# Patient Record
Sex: FEMALE | Race: BLACK OR AFRICAN AMERICAN | NOT HISPANIC OR LATINO | Employment: OTHER | URBAN - METROPOLITAN AREA
[De-identification: names, ages, dates, MRNs, and addresses within clinical notes are randomized per-mention and may not be internally consistent; named-entity substitution may affect disease eponyms.]

---

## 2017-01-05 ENCOUNTER — HISTORICAL (OUTPATIENT)
Dept: ADMINISTRATIVE | Facility: HOSPITAL | Age: 62
End: 2017-01-05

## 2017-01-11 ENCOUNTER — HISTORICAL (OUTPATIENT)
Dept: HEMATOLOGY/ONCOLOGY | Facility: CLINIC | Age: 62
End: 2017-01-11

## 2017-01-18 ENCOUNTER — HISTORICAL (OUTPATIENT)
Dept: ADMINISTRATIVE | Facility: HOSPITAL | Age: 62
End: 2017-01-18

## 2017-01-25 ENCOUNTER — HISTORICAL (OUTPATIENT)
Dept: SURGERY | Facility: HOSPITAL | Age: 62
End: 2017-01-25

## 2017-02-14 ENCOUNTER — HISTORICAL (OUTPATIENT)
Dept: INFUSION THERAPY | Facility: HOSPITAL | Age: 62
End: 2017-02-14

## 2017-02-23 ENCOUNTER — HISTORICAL (OUTPATIENT)
Dept: INFUSION THERAPY | Facility: HOSPITAL | Age: 62
End: 2017-02-23

## 2017-02-24 ENCOUNTER — HISTORICAL (OUTPATIENT)
Dept: INFUSION THERAPY | Facility: HOSPITAL | Age: 62
End: 2017-02-24

## 2017-03-23 ENCOUNTER — HISTORICAL (OUTPATIENT)
Dept: INFUSION THERAPY | Facility: HOSPITAL | Age: 62
End: 2017-03-23

## 2017-03-24 ENCOUNTER — HISTORICAL (OUTPATIENT)
Dept: INFUSION THERAPY | Facility: HOSPITAL | Age: 62
End: 2017-03-24

## 2017-04-19 ENCOUNTER — HISTORICAL (OUTPATIENT)
Dept: ADMINISTRATIVE | Facility: HOSPITAL | Age: 62
End: 2017-04-19

## 2017-05-18 ENCOUNTER — HISTORICAL (OUTPATIENT)
Dept: ADMINISTRATIVE | Facility: HOSPITAL | Age: 62
End: 2017-05-18

## 2017-05-18 LAB
ABS NEUT (OLG): 1.21 X10(3)/MCL (ref 2.1–9.2)
ALBUMIN SERPL-MCNC: 3.6 GM/DL (ref 3.4–5)
ALBUMIN/GLOB SERPL: 1 {RATIO}
ALP SERPL-CCNC: 83 UNIT/L (ref 20–120)
ALT SERPL-CCNC: 21 UNIT/L
AST SERPL-CCNC: 23 UNIT/L
BASOPHILS # BLD AUTO: 0.03 X10(3)/MCL
BASOPHILS NFR BLD AUTO: 1 % (ref 0–1)
BILIRUB SERPL-MCNC: 0.4 MG/DL
BILIRUBIN DIRECT+TOT PNL SERPL-MCNC: <0.1 MG/DL
BILIRUBIN DIRECT+TOT PNL SERPL-MCNC: >0.3 MG/DL
BUN SERPL-MCNC: 14 MG/DL (ref 7–25)
CALCIUM SERPL-MCNC: 9.1 MG/DL (ref 8.4–10.3)
CHLORIDE SERPL-SCNC: 106 MMOL/L (ref 96–110)
CO2 SERPL-SCNC: 30 MMOL/L (ref 24–32)
CREAT SERPL-MCNC: 0.91 MG/DL (ref 0.7–1.1)
EOSINOPHIL # BLD AUTO: 0.16 X10(3)/MCL
EOSINOPHIL NFR BLD AUTO: 6 % (ref 0–5)
ERYTHROCYTE [DISTWIDTH] IN BLOOD BY AUTOMATED COUNT: 16.3 % (ref 11.5–14.5)
GLOBULIN SER-MCNC: 2.9 GM/ML (ref 2.3–3.5)
GLUCOSE SERPL-MCNC: 77 MG/DL (ref 65–99)
HCT VFR BLD AUTO: 37.2 % (ref 35–46)
HGB BLD-MCNC: 11.9 GM/DL (ref 12–16)
LDH SERPL-CCNC: 162 UNIT/L
LYMPHOCYTES # BLD AUTO: 0.7 X10(3)/MCL
LYMPHOCYTES NFR BLD AUTO: 27 % (ref 15–40)
MCH RBC QN AUTO: 25.9 PG (ref 26–34)
MCHC RBC AUTO-ENTMCNC: 32 GM/DL (ref 31–37)
MCV RBC AUTO: 80.9 FL (ref 80–100)
MONOCYTES # BLD AUTO: 0.47 X10(3)/MCL
MONOCYTES NFR BLD AUTO: 18 % (ref 4–12)
NEUTROPHILS # BLD AUTO: 1.21 X10(3)/MCL
NEUTROPHILS NFR BLD AUTO: 47 X10(3)/MCL
PLATELET # BLD AUTO: 158 X10(3)/MCL (ref 130–400)
PMV BLD AUTO: 10.1 FL (ref 7.4–10.4)
POTASSIUM SERPL-SCNC: 4.4 MMOL/L (ref 3.6–5.2)
PROT SERPL-MCNC: 6.5 GM/DL (ref 6–8)
RBC # BLD AUTO: 4.6 X10(6)/MCL (ref 4–5.2)
SODIUM SERPL-SCNC: 144 MMOL/L (ref 135–146)
WBC # SPEC AUTO: 2.6 X10(3)/MCL (ref 4.5–11)

## 2017-05-25 ENCOUNTER — HISTORICAL (OUTPATIENT)
Dept: ADMINISTRATIVE | Facility: HOSPITAL | Age: 62
End: 2017-05-25

## 2017-05-25 LAB
ABS NEUT (OLG): 1.43 X10(3)/MCL (ref 2.1–9.2)
ALBUMIN SERPL-MCNC: 3.6 GM/DL (ref 3.4–5)
ALBUMIN/GLOB SERPL: 1 {RATIO}
ALP SERPL-CCNC: 89 UNIT/L (ref 20–120)
ALT SERPL-CCNC: 20 UNIT/L
AST SERPL-CCNC: 25 UNIT/L
BASOPHILS # BLD AUTO: 0.04 X10(3)/MCL
BASOPHILS NFR BLD AUTO: 1 % (ref 0–1)
BILIRUB SERPL-MCNC: 0.8 MG/DL
BILIRUBIN DIRECT+TOT PNL SERPL-MCNC: <0.1 MG/DL
BILIRUBIN DIRECT+TOT PNL SERPL-MCNC: >0.7 MG/DL
BUN SERPL-MCNC: 17 MG/DL (ref 7–25)
CALCIUM SERPL-MCNC: 8.9 MG/DL (ref 8.4–10.3)
CHLORIDE SERPL-SCNC: 108 MMOL/L (ref 96–110)
CO2 SERPL-SCNC: 29 MMOL/L (ref 24–32)
CREAT SERPL-MCNC: 0.67 MG/DL (ref 0.7–1.1)
EOSINOPHIL # BLD AUTO: 0.13 10*3/UL
EOSINOPHIL NFR BLD AUTO: 4 % (ref 0–5)
ERYTHROCYTE [DISTWIDTH] IN BLOOD BY AUTOMATED COUNT: 16 % (ref 11.5–14.5)
GLOBULIN SER-MCNC: 3.2 GM/ML (ref 2.3–3.5)
GLUCOSE SERPL-MCNC: 84 MG/DL (ref 65–99)
HCT VFR BLD AUTO: 38.5 % (ref 35–46)
HGB BLD-MCNC: 12.1 GM/DL (ref 12–16)
LYMPHOCYTES # BLD AUTO: 0.82 X10(3)/MCL
LYMPHOCYTES NFR BLD AUTO: 28 % (ref 15–40)
MCH RBC QN AUTO: 26.1 PG (ref 26–34)
MCHC RBC AUTO-ENTMCNC: 31.4 GM/DL (ref 31–37)
MCV RBC AUTO: 83 FL (ref 80–100)
MONOCYTES # BLD AUTO: 0.5 X10(3)/MCL
MONOCYTES NFR BLD AUTO: 17 % (ref 4–12)
NEUTROPHILS # BLD AUTO: 1.43 X10(3)/MCL
NEUTROPHILS NFR BLD AUTO: 49 X10(3)/MCL
PLATELET # BLD AUTO: 160 X10(3)/MCL (ref 130–400)
PMV BLD AUTO: 11.1 FL (ref 7.4–10.4)
POTASSIUM SERPL-SCNC: 4.3 MMOL/L (ref 3.6–5.2)
PROT SERPL-MCNC: 6.8 GM/DL (ref 6–8)
RBC # BLD AUTO: 4.64 X10(6)/MCL (ref 4–5.2)
SODIUM SERPL-SCNC: 139 MMOL/L (ref 135–146)
WBC # SPEC AUTO: 2.9 X10(3)/MCL (ref 4.5–11)

## 2017-06-01 ENCOUNTER — HISTORICAL (OUTPATIENT)
Dept: ADMINISTRATIVE | Facility: HOSPITAL | Age: 62
End: 2017-06-01

## 2017-06-22 ENCOUNTER — HISTORICAL (OUTPATIENT)
Dept: ADMINISTRATIVE | Facility: HOSPITAL | Age: 62
End: 2017-06-22

## 2017-06-22 LAB
ABS NEUT (OLG): 1.04 X10(3)/MCL
ALBUMIN SERPL-MCNC: 3.8 GM/DL (ref 3.4–5)
ALBUMIN/GLOB SERPL: 1 {RATIO}
ALP SERPL-CCNC: 79 UNIT/L (ref 20–120)
ALT SERPL-CCNC: 20 UNIT/L
ANISOCYTOSIS BLD QL SMEAR: NORMAL
AST SERPL-CCNC: 23 UNIT/L
BASOPHILS NFR BLD MANUAL: 0 %
BILIRUB SERPL-MCNC: 0.7 MG/DL
BILIRUBIN DIRECT+TOT PNL SERPL-MCNC: <0.1 MG/DL
BILIRUBIN DIRECT+TOT PNL SERPL-MCNC: >0.6 MG/DL
BUN SERPL-MCNC: 9 MG/DL (ref 7–25)
CALCIUM SERPL-MCNC: 8.9 MG/DL (ref 8.4–10.3)
CHLORIDE SERPL-SCNC: 106 MMOL/L (ref 96–110)
CO2 SERPL-SCNC: 30 MMOL/L (ref 24–32)
CREAT SERPL-MCNC: 0.61 MG/DL (ref 0.7–1.1)
EOSINOPHIL NFR BLD MANUAL: 2 %
ERYTHROCYTE [DISTWIDTH] IN BLOOD BY AUTOMATED COUNT: 15.3 % (ref 11.5–14.5)
GLOBULIN SER-MCNC: 2.9 GM/ML (ref 2.3–3.5)
GLUCOSE SERPL-MCNC: 81 MG/DL (ref 65–99)
GRANULOCYTES NFR BLD MANUAL: 50 %
HCT VFR BLD AUTO: 38.8 % (ref 35–46)
HGB BLD-MCNC: 12.5 GM/DL (ref 12–16)
LYMPHOCYTES NFR BLD MANUAL: 35 %
MACROCYTES BLD QL SMEAR: NORMAL
MCH RBC QN AUTO: 26.3 PG (ref 26–34)
MCHC RBC AUTO-ENTMCNC: 32.2 GM/DL (ref 31–37)
MCV RBC AUTO: 81.7 FL (ref 80–100)
MICROCYTES BLD QL SMEAR: NORMAL
MONOCYTES NFR BLD MANUAL: 9 %
NEUTS BAND NFR BLD MANUAL: 4 %
OVALOCYTES BLD QL SMEAR: NORMAL
PLATELET # BLD AUTO: 207 X10(3)/MCL (ref 130–400)
PLATELET # BLD EST: ADEQUATE 10*3/UL
PMV BLD AUTO: 10.1 FL (ref 7.4–10.4)
POTASSIUM SERPL-SCNC: 3.6 MMOL/L (ref 3.6–5.2)
PROT SERPL-MCNC: 6.7 GM/DL (ref 6–8)
RBC # BLD AUTO: 4.75 X10(6)/MCL (ref 4–5.2)
RBC MORPH BLD: NORMAL
SODIUM SERPL-SCNC: 141 MMOL/L (ref 135–146)
WBC # SPEC AUTO: 2.4 X10(3)/MCL (ref 4.5–11)

## 2017-06-29 ENCOUNTER — HISTORICAL (OUTPATIENT)
Dept: ADMINISTRATIVE | Facility: HOSPITAL | Age: 62
End: 2017-06-29

## 2017-06-29 LAB
ABS NEUT (OLG): 1.36 X10(3)/MCL (ref 2.1–9.2)
ALBUMIN SERPL-MCNC: 3.3 GM/DL (ref 3.4–5)
ALBUMIN/GLOB SERPL: 1 RATIO (ref 1–2)
ALP SERPL-CCNC: 89 UNIT/L (ref 45–117)
ALT SERPL-CCNC: 22 UNIT/L (ref 12–78)
AST SERPL-CCNC: 21 UNIT/L (ref 15–37)
BASOPHILS # BLD AUTO: 0.03 X10(3)/MCL
BASOPHILS NFR BLD AUTO: 1 % (ref 0–1)
BILIRUB SERPL-MCNC: 0.3 MG/DL (ref 0.2–1)
BILIRUBIN DIRECT+TOT PNL SERPL-MCNC: 0.1 MG/DL
BILIRUBIN DIRECT+TOT PNL SERPL-MCNC: 0.2 MG/DL
BUN SERPL-MCNC: 11 MG/DL (ref 7–18)
CALCIUM SERPL-MCNC: 8.7 MG/DL (ref 8.5–10.1)
CHLORIDE SERPL-SCNC: 105 MMOL/L (ref 98–107)
CO2 SERPL-SCNC: 31 MMOL/L (ref 21–32)
CREAT SERPL-MCNC: 0.7 MG/DL (ref 0.6–1.3)
EOSINOPHIL # BLD AUTO: 0.06 10*3/UL
EOSINOPHIL NFR BLD AUTO: 2 % (ref 0–5)
ERYTHROCYTE [DISTWIDTH] IN BLOOD BY AUTOMATED COUNT: 15.1 % (ref 11.5–14.5)
GLOBULIN SER-MCNC: 3.8 GM/ML (ref 2.3–3.5)
GLUCOSE SERPL-MCNC: 88 MG/DL (ref 74–106)
HCT VFR BLD AUTO: 38.2 % (ref 35–46)
HGB BLD-MCNC: 12.1 GM/DL (ref 12–16)
IMM GRANULOCYTES # BLD AUTO: 0.01 10*3/UL
IMM GRANULOCYTES NFR BLD AUTO: 0 %
LYMPHOCYTES # BLD AUTO: 0.55 X10(3)/MCL
LYMPHOCYTES NFR BLD AUTO: 22 % (ref 15–40)
MCH RBC QN AUTO: 26 PG (ref 26–34)
MCHC RBC AUTO-ENTMCNC: 31.7 GM/DL (ref 31–37)
MCV RBC AUTO: 82 FL (ref 80–100)
MONOCYTES # BLD AUTO: 0.46 X10(3)/MCL
MONOCYTES NFR BLD AUTO: 19 % (ref 4–12)
NEUTROPHILS # BLD AUTO: 1.36 X10(3)/MCL
NEUTROPHILS NFR BLD AUTO: 55 X10(3)/MCL
PLATELET # BLD AUTO: 181 X10(3)/MCL (ref 130–400)
PMV BLD AUTO: 9.8 FL (ref 7.4–10.4)
POTASSIUM SERPL-SCNC: 3.8 MMOL/L (ref 3.5–5.1)
PROT SERPL-MCNC: 7.1 GM/DL (ref 6.4–8.2)
RBC # BLD AUTO: 4.66 X10(6)/MCL (ref 4–5.2)
SODIUM SERPL-SCNC: 144 MMOL/L (ref 136–145)
WBC # SPEC AUTO: 2.5 X10(3)/MCL (ref 4.5–11)

## 2017-09-01 LAB — CRC RECOMMENDATION EXT: NORMAL

## 2017-12-11 ENCOUNTER — HISTORICAL (OUTPATIENT)
Dept: ADMINISTRATIVE | Facility: HOSPITAL | Age: 62
End: 2017-12-11

## 2017-12-13 LAB
FINAL CULTURE: NO GROWTH
FINAL CULTURE: NO GROWTH

## 2018-06-15 ENCOUNTER — HISTORICAL (OUTPATIENT)
Dept: ADMINISTRATIVE | Facility: HOSPITAL | Age: 63
End: 2018-06-15

## 2018-09-13 ENCOUNTER — HISTORICAL (OUTPATIENT)
Dept: ADMINISTRATIVE | Facility: HOSPITAL | Age: 63
End: 2018-09-13

## 2018-12-17 ENCOUNTER — HISTORICAL (OUTPATIENT)
Dept: ADMINISTRATIVE | Facility: HOSPITAL | Age: 63
End: 2018-12-17

## 2019-06-18 ENCOUNTER — HISTORICAL (OUTPATIENT)
Dept: ADMINISTRATIVE | Facility: HOSPITAL | Age: 64
End: 2019-06-18

## 2019-07-09 ENCOUNTER — HISTORICAL (OUTPATIENT)
Dept: ADMINISTRATIVE | Facility: HOSPITAL | Age: 64
End: 2019-07-09

## 2019-07-09 LAB
ABS NEUT (OLG): 1.55 X10(3)/MCL (ref 2.1–9.2)
ALBUMIN SERPL-MCNC: 3.4 GM/DL (ref 3.4–5)
ALBUMIN/GLOB SERPL: 0.9 RATIO (ref 1.1–2)
ALP SERPL-CCNC: 73 UNIT/L (ref 45–117)
ALT SERPL-CCNC: 20 UNIT/L (ref 12–78)
AST SERPL-CCNC: 17 UNIT/L (ref 15–37)
BASOPHILS # BLD AUTO: 0.01 X10(3)/MCL
BASOPHILS NFR BLD AUTO: 0 %
BILIRUB SERPL-MCNC: 0.2 MG/DL (ref 0.2–1)
BILIRUBIN DIRECT+TOT PNL SERPL-MCNC: <0.1 MG/DL
BILIRUBIN DIRECT+TOT PNL SERPL-MCNC: >0.1 MG/DL
BUN SERPL-MCNC: 15 MG/DL (ref 7–18)
CALCIUM SERPL-MCNC: 8.7 MG/DL (ref 8.5–10.1)
CHLORIDE SERPL-SCNC: 108 MMOL/L (ref 98–107)
CO2 SERPL-SCNC: 30 MMOL/L (ref 21–32)
CREAT SERPL-MCNC: 0.8 MG/DL (ref 0.6–1.3)
EOSINOPHIL # BLD AUTO: 0.07 X10(3)/MCL
EOSINOPHIL NFR BLD AUTO: 2 %
ERYTHROCYTE [DISTWIDTH] IN BLOOD BY AUTOMATED COUNT: 14.6 % (ref 11.5–14.5)
GLOBULIN SER-MCNC: 3.6 GM/ML (ref 2.3–3.5)
GLUCOSE SERPL-MCNC: 94 MG/DL (ref 74–106)
HCT VFR BLD AUTO: 38.6 % (ref 35–46)
HGB BLD-MCNC: 11.8 GM/DL (ref 12–16)
IMM GRANULOCYTES # BLD AUTO: 0.01 10*3/UL
IMM GRANULOCYTES NFR BLD AUTO: 0 %
LDH SERPL-CCNC: 150 UNIT/L (ref 84–246)
LYMPHOCYTES # BLD AUTO: 1.58 X10(3)/MCL
LYMPHOCYTES NFR BLD AUTO: 43 % (ref 13–40)
MCH RBC QN AUTO: 26.7 PG (ref 26–34)
MCHC RBC AUTO-ENTMCNC: 30.6 GM/DL (ref 31–37)
MCV RBC AUTO: 87.3 FL (ref 80–100)
MONOCYTES # BLD AUTO: 0.43 X10(3)/MCL
MONOCYTES NFR BLD AUTO: 12 % (ref 4–12)
NEUTROPHILS # BLD AUTO: 1.55 X10(3)/MCL
NEUTROPHILS NFR BLD AUTO: 42 X10(3)/MCL
PLATELET # BLD AUTO: 182 X10(3)/MCL (ref 130–400)
PMV BLD AUTO: 9.7 FL (ref 7.4–10.4)
POTASSIUM SERPL-SCNC: 3.7 MMOL/L (ref 3.5–5.1)
PROT SERPL-MCNC: 6.7 GM/DL
PROT SERPL-MCNC: 7 GM/DL (ref 6.4–8.2)
RBC # BLD AUTO: 4.42 X10(6)/MCL (ref 4–5.2)
SODIUM SERPL-SCNC: 143 MMOL/L (ref 136–145)
WBC # SPEC AUTO: 3.6 X10(3)/MCL (ref 4.5–11)

## 2020-03-27 ENCOUNTER — HISTORICAL (OUTPATIENT)
Dept: ADMINISTRATIVE | Facility: HOSPITAL | Age: 65
End: 2020-03-27

## 2021-12-27 ENCOUNTER — HISTORICAL (OUTPATIENT)
Dept: ADMINISTRATIVE | Facility: HOSPITAL | Age: 66
End: 2021-12-27

## 2021-12-27 LAB
CHOLEST SERPL-MSCNC: 142 MG/DL (ref 0–200)
HDLC SERPL-MCNC: 64 MG/DL (ref 35–70)
LDLC SERPL CALC-MCNC: 69 MG/DL (ref 0–160)
TRIGL SERPL-MCNC: 47 MG/DL (ref 40–160)

## 2022-02-08 ENCOUNTER — HISTORICAL (OUTPATIENT)
Dept: ADMINISTRATIVE | Facility: HOSPITAL | Age: 67
End: 2022-02-08

## 2022-02-08 ENCOUNTER — HISTORICAL (OUTPATIENT)
Dept: RADIOLOGY | Facility: HOSPITAL | Age: 67
End: 2022-02-08

## 2022-02-08 LAB
ABS NEUT (OLG): 1.84 (ref 2.1–9.2)
ALBUMIN SERPL-MCNC: 3.8 G/DL (ref 3.4–4.8)
ALBUMIN/GLOB SERPL: 1.1 {RATIO} (ref 1.1–2)
ALP SERPL-CCNC: 68 U/L (ref 40–150)
ALT SERPL-CCNC: 15 U/L (ref 0–55)
AST SERPL-CCNC: 22 U/L (ref 5–34)
BASOPHILS # BLD AUTO: 0 10*3/UL (ref 0–0.2)
BASOPHILS NFR BLD AUTO: 1 %
BILIRUB SERPL-MCNC: 0.5 MG/DL
BILIRUBIN DIRECT+TOT PNL SERPL-MCNC: 0.2 (ref 0–0.5)
BILIRUBIN DIRECT+TOT PNL SERPL-MCNC: 0.3 (ref 0–0.8)
BUN SERPL-MCNC: 9 MG/DL (ref 9.8–20.1)
CALCIUM SERPL-MCNC: 9.4 MG/DL (ref 8.7–10.5)
CHLORIDE SERPL-SCNC: 105 MMOL/L (ref 98–107)
CO2 SERPL-SCNC: 29 MMOL/L (ref 23–31)
CREAT SERPL-MCNC: 0.81 MG/DL (ref 0.55–1.02)
EOSINOPHIL # BLD AUTO: 0.1 10*3/UL (ref 0–0.9)
EOSINOPHIL NFR BLD AUTO: 2 %
ERYTHROCYTE [DISTWIDTH] IN BLOOD BY AUTOMATED COUNT: 15.3 % (ref 11.5–14.5)
FLAG2 (OHS): 60
FLAG3 (OHS): 80
FLAGS (OHS): 80
GLOBULIN SER-MCNC: 3.4 G/DL (ref 2.4–3.5)
GLUCOSE SERPL-MCNC: 106 MG/DL (ref 82–115)
HCT VFR BLD AUTO: 39.5 % (ref 35–46)
HEMOLYSIS INTERF INDEX SERPL-ACNC: 5
HGB BLD-MCNC: 12.3 G/DL (ref 12–16)
ICTERIC INTERF INDEX SERPL-ACNC: 1
IMM GRANULOCYTES # BLD AUTO: 0.01 10*3/UL
IMM GRANULOCYTES NFR BLD AUTO: 0 %
IMM. NE 2 SUSPECT FLAG (OHS): 10
LDH SERPL-CCNC: 229 U/L (ref 140–271)
LOW EVENT # SUSPECT FLAG (OHS): 80
LYMPHOCYTES # BLD AUTO: 1.6 10*3/UL (ref 0.6–4.6)
LYMPHOCYTES NFR BLD AUTO: 42 %
MANUAL DIFF? (OHS): NO
MCH RBC QN AUTO: 26.3 PG (ref 26–34)
MCHC RBC AUTO-ENTMCNC: 31.1 G/DL (ref 31–37)
MCV RBC AUTO: 84.4 FL (ref 80–100)
MO BLASTS SUSPECT FLAG (OHS): 80
MONOCYTES # BLD AUTO: 0.3 10*3/UL (ref 0.1–1.3)
MONOCYTES NFR BLD AUTO: 9 %
NEUTROPHILS # BLD AUTO: 1.84 10*3/UL (ref 2.1–9.2)
NEUTROPHILS NFR BLD AUTO: 46 %
NRBC BLD AUTO-RTO: 0 % (ref 0–0.2)
PLATELET # BLD AUTO: 212 10*3/UL (ref 130–400)
PLATELET CLUMPS SUSPECT FLAG (OHS): 10
PMV BLD AUTO: 11 FL (ref 7.4–10.4)
POTASSIUM SERPL-SCNC: 4.2 MMOL/L (ref 3.5–5.1)
PROT SERPL-MCNC: 7.2 G/DL (ref 5.8–7.6)
RBC # BLD AUTO: 4.68 10*6/UL (ref 4–5.2)
SODIUM SERPL-SCNC: 140 MMOL/L (ref 136–145)
WBC # SPEC AUTO: 4 10*3/UL (ref 4.5–11)

## 2022-02-24 ENCOUNTER — HISTORICAL (OUTPATIENT)
Dept: RADIOLOGY | Facility: HOSPITAL | Age: 67
End: 2022-02-24

## 2022-02-24 ENCOUNTER — HISTORICAL (OUTPATIENT)
Dept: ADMINISTRATIVE | Facility: HOSPITAL | Age: 67
End: 2022-02-24

## 2022-02-28 ENCOUNTER — HISTORICAL (OUTPATIENT)
Dept: ADMINISTRATIVE | Facility: HOSPITAL | Age: 67
End: 2022-02-28

## 2022-02-28 LAB
ABS NEUT (OLG): 1.6 (ref 2.1–9.2)
ALBUMIN SERPL-MCNC: 3.7 G/DL (ref 3.4–4.8)
ALBUMIN/GLOB SERPL: 1 {RATIO} (ref 1.1–2)
ALP SERPL-CCNC: 60 U/L (ref 40–150)
ALT SERPL-CCNC: 15 U/L (ref 0–55)
AST SERPL-CCNC: 17 U/L (ref 5–34)
BASOPHILS # BLD AUTO: 0 10*3/UL (ref 0–0.2)
BASOPHILS NFR BLD AUTO: 1 %
BILIRUB SERPL-MCNC: 0.4 MG/DL
BILIRUBIN DIRECT+TOT PNL SERPL-MCNC: 0.2 (ref 0–0.5)
BILIRUBIN DIRECT+TOT PNL SERPL-MCNC: 0.2 (ref 0–0.8)
BUN SERPL-MCNC: 6.7 MG/DL (ref 9.8–20.1)
CALCIUM SERPL-MCNC: 9.2 MG/DL (ref 8.7–10.5)
CHLORIDE SERPL-SCNC: 106 MMOL/L (ref 98–107)
CO2 SERPL-SCNC: 32 MMOL/L (ref 23–31)
CREAT SERPL-MCNC: 0.75 MG/DL (ref 0.55–1.02)
EOSINOPHIL # BLD AUTO: 0.1 10*3/UL (ref 0–0.9)
EOSINOPHIL NFR BLD AUTO: 2 %
ERYTHROCYTE [DISTWIDTH] IN BLOOD BY AUTOMATED COUNT: 14.7 % (ref 11.5–14.5)
FLAG2 (OHS): 60
FLAG3 (OHS): 80
FLAGS (OHS): 80
GLOBULIN SER-MCNC: 3.6 G/DL (ref 2.4–3.5)
GLUCOSE SERPL-MCNC: 91 MG/DL (ref 82–115)
HCT VFR BLD AUTO: 38 % (ref 35–46)
HEMOLYSIS INTERF INDEX SERPL-ACNC: 2
HGB BLD-MCNC: 11.8 G/DL (ref 12–16)
ICTERIC INTERF INDEX SERPL-ACNC: 1
IMM GRANULOCYTES # BLD AUTO: 0.01 10*3/UL
IMM GRANULOCYTES NFR BLD AUTO: 0 %
IMM. NE 2 SUSPECT FLAG (OHS): 40
LDH SERPL-CCNC: 192 U/L (ref 140–271)
LIPEMIC INTERF INDEX SERPL-ACNC: <0
LOW EVENT # SUSPECT FLAG (OHS): 80
LYMPHOCYTES # BLD AUTO: 2 10*3/UL (ref 0.6–4.6)
LYMPHOCYTES NFR BLD AUTO: 48 %
MANUAL DIFF? (OHS): NO
MCH RBC QN AUTO: 26.8 PG (ref 26–34)
MCHC RBC AUTO-ENTMCNC: 31.1 G/DL (ref 31–37)
MCV RBC AUTO: 86.2 FL (ref 80–100)
MO BLASTS SUSPECT FLAG (OHS): 80
MONOCYTES # BLD AUTO: 0.4 10*3/UL (ref 0.1–1.3)
MONOCYTES NFR BLD AUTO: 10 %
NEUTROPHILS # BLD AUTO: 1.6 10*3/UL (ref 2.1–9.2)
NEUTROPHILS NFR BLD AUTO: 39 %
NRBC BLD AUTO-RTO: 0 % (ref 0–0.2)
PLATELET # BLD AUTO: 198 10*3/UL (ref 130–400)
PLATELET CLUMPS SUSPECT FLAG (OHS): 50
PMV BLD AUTO: 10.2 FL (ref 7.4–10.4)
POTASSIUM SERPL-SCNC: 3.8 MMOL/L (ref 3.5–5.1)
PROT SERPL-MCNC: 7.3 G/DL (ref 5.8–7.6)
RBC # BLD AUTO: 4.41 10*6/UL (ref 4–5.2)
SODIUM SERPL-SCNC: 141 MMOL/L (ref 136–145)
WBC # SPEC AUTO: 4.1 10*3/UL (ref 4.5–11)

## 2022-04-07 ENCOUNTER — HISTORICAL (OUTPATIENT)
Dept: ADMINISTRATIVE | Facility: HOSPITAL | Age: 67
End: 2022-04-07
Payer: MEDICARE

## 2022-04-23 VITALS
HEIGHT: 64 IN | WEIGHT: 171.5 LBS | OXYGEN SATURATION: 99 % | BODY MASS INDEX: 29.28 KG/M2 | SYSTOLIC BLOOD PRESSURE: 128 MMHG | DIASTOLIC BLOOD PRESSURE: 75 MMHG

## 2022-04-30 NOTE — PROGRESS NOTES
Patient:   Jeane Cohen             MRN: [id number]            FIN: [id number]               Age:   62 years     Sex:  Female     :  1955   Associated Diagnoses:   Follicular large cell non-Hodgkin's lymphoma   Author:   Hill BABCOCK, Vanita Medina      Visit Information   Visit type:  Scheduled follow-up.    Accompanied by:  Spouse.    History limitation:  None.       Chief Complaint   follow up    Problem list:  1.  Stage IV follicular grade 1 non-Hodgkin's lymphoma, diagnosed on 2016    Current Treatment:  Bendamustine + Rituxan q4 weeks x 6 cycles. Started on 17.  Here for #3    Treatment History:    HPI:  62-year-old healthy  female with recently identified abdominal mesenteric adenopathy, with needle aspirate consistent with follicular non-Hodgkin's lymphoma, referred for evaluation management.  She saw Dr. Hayes of urology earlier this year for the same evaluation; she's never had gross hematuria.  As part of the evaluation, he performed a CT scan of the abdomen and pelvis, performed 2016; this showed rather prominent retroperitoneal and mesenteric lymph nodes.  She had follow-up in medicine clinic, and had blood test to include HIV negative, hepatitis panel negative, EBV negative.  She is gone on to have a CT-guided fine-needle aspirate left-sided retroperitoneal adenopathy performed on 2016.  This flow cytometry and cytology confirms a Light chain restricted B-cell nipple proliferation, that CD19 positive, CD20 positive, CD5 negative, and CD10 positive.  Interpretation is CD10 positive B-cell lymphoproliferative disorder, such as seen and follicular lymphoma.  Cytologically, these are described as small lymphocytes.      2017- s/p laparoscopy for biopsy of lymph nodes. Showed follicular lymphoma, grade 1.  ED positive B-cell lymphoproliferative disorder consistent with non-Hodgkin's lymphoma.         Interval History   Patient presents for continued treatment with BR, #4  scheduled today.  She is tolerating treatment well, with no significant complaints. Her counts have been adequate, however today she is mildly neutropenic, she denies fever.  She has mild occassional intermittent nausea, not real bothersome. Recent imaging shows resolved adenopathy.       Review of Systems   A complete 12 point ROS was performed in full with pertinent positives as described in interval history. Remainder of ROS done in full and are negative.         Health Status   Allergies:    Allergic Reactions (Selected)  Severity Not Documented  Bee Stings- No reactions were documented.,    Allergies (1) Active Reaction  Bee Stings None Documented     Current medications.Problem list:    Active Problems (4)  Adenopathy   Follicular large cell non-Hodgkin's lymphoma   Lymphoma   Microhematuria         Histories   Past Medical History:    Resolved  Morbid obesity ([id number]):  Resolved on 9/20/2016 at 61 years.   Family History:    Primary malignant neoplasm of breast  Sister  Agent Orange  Brother  Hypertension.  Father  Mother     Procedure history:    Catheter Insertion Mediport (None) on 1/25/2017 at 61 Years.  Comments:  1/25/2017 12:13 - Gisel Dejesus RN  auto-populated from documented surgical case  Laparoscopy Exploratory (None) on 1/25/2017 at 61 Years.  Comments:  1/25/2017 12:13 - Gisel Dejesus RN  auto-populated from documented surgical case  Left sided retroperitonal adenopathies bx on 9/7/2016 at 61 Years.  Total hysterectomy ([id number]) in 2002 at 47 Years.  hysterectomy  2002.  tubal ligation.   Social History        Social & Psychosocial Habits    Alcohol  03/29/2016  Use: Never    Employment/School  03/29/2016  Status: Employed    Description: first studetn  aide on bus    Home/Environment  03/29/2016  Lives with: Spouse    Living situation: Home/Independent    Alcohol abuse in household: No    Substance abuse in household: No    Smoker in household: No    Injuries/Abuse/Neglect in  household: No    Feels unsafe at home: No    Substance Abuse  03/29/2016  Use: Never    Tobacco  03/29/2016  Use: Never smoker  .        Physical Examination   General:  No acute distress.    Eye:  Pupils are equal, round and reactive to light.    HENT:  Normocephalic.    Neck:  No jugular venous distention.    Respiratory:  Lungs are clear to auscultation.    Cardiovascular:  Normal rate, Regular rhythm, No murmur, Good pulses equal in all extremities, No edema.    Gastrointestinal:  Soft, Non-distended, Normal bowel sounds, mild tenderness below the umbilicus at biopsy site.    Genitourinary:  No costovertebral angle tenderness.    Vital Signs   5/18/2017 9:46 CDT       Temperature Oral          37.0 DegC                             Peripheral Pulse Rate     74 bpm                             Respiratory Rate          18 br/min                             SpO2                      99 %                             Systolic Blood Pressure   133 mmHg                             Diastolic Blood Pressure  83 mmHg           ECOG performance status equals 1   Integumentary:  Warm, Dry, Intact, No rash.    Neurologic:  Alert, Oriented, No focal deficits.    Psychiatric:  Cooperative, Appropriate mood & affect.    Musculoskeletal:  L leg varicose veins with compression stockings.       Review / Management   Laboratory Results   Today's Lab Results : PowerNote Discrete Results   5/18/2017 9:23 CDT       WBC                       2.6 x10(3)/mcL  LOW                             RBC                       4.60 x10(6)/mcL                             Hgb                       11.9 gm/dL  LOW                             Hct                       37.2 %                             Platelet                  158 x10(3)/mcL                             MCV                       80.9 fL                             MCH                       25.9 pg  LOW                             MCHC                      32.0 gm/dL                              RDW                       16.3 %  HI                             MPV                       10.1 fL                             Abs Neut                  1.21 x10(3)/mcL  LOW                             Neutro Auto               47 x10(3)/mcL  NA                             Lymph Auto                27 %                             Mono Auto                 18 %  HI                             Eos Auto                  6 %  HI                             Abs Eos                   0.16 x10(3)/mcL  NA                             Basophil Auto             1 %                             Abs Neutro                1.21 x10(3)/mcL  NA                             Abs Lymph                 0.70 x10(3)/mcL  NA                             Abs Mono                  0.47 x10(3)/mcL  NA                             Abs Baso                  0.03 x10(3)/mcL  NA                             Sodium Lvl                144 mmol/L                             Potassium Lvl             4.4 mmol/L                             Chloride                  106 mmol/L                             CO2                       30 mmol/L                             Calcium Lvl               9.1 mg/dL                             Glucose Lvl               77 mg/dL                             BUN                       14 mg/dL                             Creatinine                0.91 mg/dL                             eGFR-AA                   81 mL/min  LOW                             eGFR-ZEV                  67 mL/min  LOW                             Bili Total                0.4 mg/dL                             Bili Direct               <0.1 mg/dL                             Bili Indirect             >0.3 mg/dL                             AST                       23 unit/L                             ALT                       21 unit/L                             Alk Phos                  83 unit/L                             Total Protein              6.5 gm/dL                             Albumin Lvl               3.6 gm/dL                             Globulin                  2.90 gm/mL                             A/G Ratio                 1  NA                             LDH                       162 unit/L           Impression and Plan     1.  Stage IV follicular grade 1 non-Hodgkin's lymphoma; she has significant moderately symptomatic abdominal retroperitoneal lymph nodes, and clinical and PET scan identified osseous involvement;  bone marrow sampling + by flow cytometry only, no peripheral nodes, no obvious mediastinal involvement.  Due to symptomatic disease,  chemotherapy was recommended with bendamustine 2 consecutive days every 4 weeks for 6 cycles along with rituximab monthly for 6 months.    Imaging after 2 cyles shows complete response    Plan  Bendamustine/Rituxan q 4 weeks for 6 cycles followed by monthly rituximab for 6 months  Delay cycle #4 for 1 week for mild neutropenia, pending labs with 15% dose reduction in bendamustine    RTC in 5 weeks (4 weeks from cycle #4) for cycle #5  with CBC, CMP      Diagnosis     Follicular large cell non-Hodgkin's lymphoma ([id number] C82.80).     Patient Instructions:  Non-Hodgkin Lymphoma, Adult.

## 2022-04-30 NOTE — PROGRESS NOTES
Patient:   Jeane Cohen             MRN: [id number]            FIN: [id number]               Age:   62 years     Sex:  Female     :  1955   Associated Diagnoses:   None   Author:   Mauricio Mace MD      Chief Complaint   follow up    Problem list:  1.  Stage IV follicular grade 1 non-Hodgkin's lymphoma, diagnosed on 2016    Current Treatment:  Bendamustine + Rituxan q4 weeks x 6 cycles. Started on 17.  Finished 6 cycles on 2017.  Follow-up CT scans with excellent response.    Treatment History:    HPI:  62-year-old healthy  female with recently identified abdominal mesenteric adenopathy, with needle aspirate consistent with follicular non-Hodgkin's lymphoma, referred for evaluation management.  She saw Dr. Hayes of urology earlier this year for the same evaluation; she's never had gross hematuria.  As part of the evaluation, he performed a CT scan of the abdomen and pelvis, performed 2016; this showed rather prominent retroperitoneal and mesenteric lymph nodes.  She had follow-up in medicine clinic, and had blood test to include HIV negative, hepatitis panel negative, EBV negative.  She is gone on to have a CT-guided fine-needle aspirate left-sided retroperitoneal adenopathy performed on 2016.  This flow cytometry and cytology confirms a Light chain restricted B-cell nipple proliferation, that CD19 positive, CD20 positive, CD5 negative, and CD10 positive.  Interpretation is CD10 positive B-cell lymphoproliferative disorder, such as seen and follicular lymphoma.  Cytologically, these are described as small lymphocytes.      2017- s/p laparoscopy for biopsy of lymph nodes. Showed follicular lymphoma, grade 1.  ED positive B-cell lymphoproliferative disorder consistent with non-Hodgkin's lymphoma.    Completed 6 cycles of chemotherapy with bendamustine/Rituxan on 2017, with excellent response noted on CT scans.      Interval History     Presents for a follow-up  visit.  Doing well.  Endorses no symptoms of concern.  Appetite is good.  Has not felt any enlarged nodes.  No recurrent fevers or drenching night sweats.    Labs reviewed.  , elevated.  CMP unremarkable.  White count 3900.  Neutrophil count 2920/mm³.  Hemoglobin 12 g percent.  Platelets normal.    To recall, completed 6 cycles of bendamustine/Rituxan on 08/21/2017, with follow-up CT scans of chest abdomen and pelvis on 08/21/2017 showing excellent response.  Feels well.  Endorses no symptoms of concern.  No fevers chills weakness night sweats or anorexia.  Happy. Screening colonoscopy on 09/01/2017 was unremarkable.         Review of Systems   A complete 12 point ROS was performed in full with pertinent positives as described in interval history. Remainder of ROS done in full and are negative.         Health Status   Allergies:    Allergic Reactions (Selected)  Severity Not Documented  Bee Stings- No reactions were documented.,    Allergies (1) Active Reaction  Bee Stings None Documented     Current medications:  (Selected)   Outpatient Medications  Ordered  Heparin Flush 100 U/mL - 5 mL: 500 units, form: Injection, IV Push, Once-chemo, first dose 02/23/17 13:50:00 CST, stop date 02/23/17 13:50:00 CST, Day 1, 970,215  Heparin Flush 100 U/mL - 5 mL: 500 units, form: Injection, IV Push, Once-chemo, first dose 02/24/17 17:45:00 CST, stop date 02/24/17 17:45:00 CST, Day 2, 970,215  Heparin Flush 100 U/mL - 5 mL: 500 units, form: Injection, IV Push, Once-chemo, first dose 03/23/17 13:36:00 CDT, stop date 03/23/17 13:36:00 CDT, Day 1, 970,215  Heparin Flush 100 U/mL - 5 mL: 500 units, form: Injection, IV Push, Once-chemo, first dose 03/24/17 17:50:00 CDT, stop date 03/24/17 17:50:00 CDT, Day 2, 970,215  Heparin Flush 100 U/mL - 5 mL: 500 units, form: Injection, IV Push, Once-chemo, first dose 04/20/17 13:59:00 CDT, stop date 04/20/17 13:59:00 CDT, Day 1, 970,215  Heparin Flush 100 U/mL - 5 mL: 500 units, form:  Injection, IV Push, Once-chemo, first dose 04/21/17 12:31:00 CDT, stop date 04/21/17 12:31:00 CDT, Day 2, 970,215  Heparin Flush 100 U/mL - 5 mL: 500 units, form: Injection, IV Push, Once-chemo, first dose 05/25/17 13:05:00 CDT, stop date 05/25/17 13:05:00 CDT, Day 1, 970,215  Heparin Flush 100 U/mL - 5 mL: 500 units, form: Injection, IV Push, Once-chemo, first dose 05/26/17 15:06:00 CDT, stop date 05/26/17 15:06:00 CDT, Day 2, 970,215  Heparin Flush 100 U/mL - 5 mL: 500 units, form: Injection, IV Push, Once-chemo, first dose 06/29/17 14:32:00 CDT, stop date 06/29/17 14:32:00 CDT, Day 1, 970,215  Heparin Flush 100 U/mL - 5 mL: 500 units, form: Injection, IV Push, Once-chemo, first dose 06/30/17 12:32:00 CDT, stop date 06/30/17 12:32:00 CDT, Day 2, 970,215  Heparin Flush 100 U/mL - 5 mL: 500 units, form: Injection, IV Push, Once-chemo, first dose 08/01/17 13:18:00 CDT, stop date 08/01/17 13:18:00 CDT, Day 1, 970,215  Heparin Flush 100 U/mL - 5 mL: 500 units, form: Injection, IV Push, Once-chemo, first dose 08/02/17 17:11:00 CDT, stop date 08/02/17 17:11:00 CDT, Day 2, 970,215  Future  heparin flush 100 units/mL (500 Unit  Flush): 500 units, form: Injection, IV Push, Once-chemo, *Est. first dose 01/09/18 15:41:00 CST, *Est. stop date 01/09/18 15:41:00 CST, Heparin Flush for Medi-port, Weeks 7, Future Order, 970,215  heparin flush 100 units/mL (500 Unit  Flush): 500 units, form: Injection, IV Push, Once-chemo, *Est. first dose 02/20/18 15:41:00 CST, *Est. stop date 02/20/18 15:41:00 CST, Heparin Flush for Medi-port, Weeks 13, Future Order, 970,215  heparin flush 100 units/mL (500 Unit  Flush): 500 units, form: Injection, IV Push, Once-chemo, *Est. first dose 04/03/18 15:41:00 CDT, *Est. stop date 04/03/18 15:41:00 CDT, Heparin Flush for Medi-port, Weeks 19, Future Order, 970,215  heparin flush 100 units/mL (500 Unit  Flush): 500 units, form: Injection, IV Push, Once-chemo, *Est. first dose 05/15/18 15:41:00 CDT, *Est.  stop date 05/15/18 15:41:00 CDT, Heparin Flush for Medi-port, Weeks 25, Future Order, 970,215  Prescriptions  Prescribed  Zofran 4 mg oral tablet: 4 mg = 1 tab(s), Oral, q8hr, PRN PRN as needed for nausea/vomiting, # 30 tab(s), 1 Refill(s), Pharmacy: [id number]  Documented Medications  Documented  Calcium, Magnesium and Zinc oral tablet: 1 tab(s), Oral, Daily, 0 Refill(s)  Cod Liver Oil oral capsule: 1 cap(s), Oral, Daily, 0 Refill(s)  Cod Liver Oil oral capsule: 1 cap(s), Oral, Daily, 0 Refill(s)  Fish Oil oral capsule: 1 cap(s), Oral, Daily, 0 Refill(s)  HYDROCO/APAP TAB 5-325MG:   Probiotic Formula (Bacillus Coagulans) oral capsule: 1 cap(s), Oral, Daily, # 30 cap(s), 0 Refill(s)  Refresh Dry Eye Therapy: 1 drop(s), Eye-Both, BID, 0 Refill(s)  Vitamin B12 2500 mcg sublingual tablet: 2,500 mcg = 1 tab(s), SL, Daily, # 90 tab(s), 0 Refill(s)  Vitamin D3 5000 intl units oral tablet: 5,000 IntUnit = 1 tab(s), Oral, Daily, # 100 tab(s), 0 Refill(s)  ferrous sulfate 325 mg oral tablet: 325 mg = 1 tab(s), Oral, Every other day, # 270 tab(s), 0 Refill(s)   Problem list:    All Problems  Adenopathy / SNOMED CT [id number] / Confirmed  Follicular large cell non-Hodgkin's lymphoma / SNOMED CT 146473308 / Confirmed  Lymphoma / SNOMED CT [id number] / Confirmed  Microhematuria / SNOMED CT 349040137 / Confirmed  Resolved: Morbid obesity / SNOMED CT 656119875  Canceled: No Chronic Problems / Cerner NKP,    Active Problems (4)  Adenopathy   Follicular large cell non-Hodgkin's lymphoma   Lymphoma   Microhematuria         Histories   Past Medical History:    Resolved  Morbid obesity ([id number]):  Resolved on 9/20/2016 at 61 years.   Family History:    Primary malignant neoplasm of breast  Sister  Agent Orange  Brother  Hypertension.  Father  Mother     Procedure history:    Colonoscopy on 9/1/2017 at 62 Years.  Comments:  9/1/2017 08:57 - Adriel LOUIS, [place]  A.  auto-populated from documented surgical case  Catheter Insertion Mediport (None) on 1/25/2017 at 61 Years.  Comments:  1/25/2017 12:13 - Gisel Dejesus RN  auto-populated from documented surgical case  Laparoscopy Exploratory (None) on 1/25/2017 at 61 Years.  Comments:  1/25/2017 12:13 - Gisel Dejesus RN  auto-populated from documented surgical case  Left sided retroperitonal adenopathies bx on 9/7/2016 at 61 Years.  Total hysterectomy ([id number]) in 2002 at 47 Years.  hysterectomy  2002.  tubal ligation.   Social History        Social & Psychosocial Habits    Alcohol  03/29/2016  Use: Never    Employment/School  03/29/2016  Status: Employed    Description: first studetn  aide on bus    Home/Environment  03/29/2016  Lives with: Spouse    Living situation: Home/Independent    Alcohol abuse in household: No    Substance abuse in household: No    Smoker in household: No    Injuries/Abuse/Neglect in household: No    Feels unsafe at home: No    Substance Abuse  03/29/2016  Use: Never    Tobacco  03/29/2016  Use: Never smoker  .        Physical Examination   Vital Signs   12/11/2017 11:43 CST     Temperature Oral          37 DegC                             Temperature Oral (calculated)             98.60 DegF                             Peripheral Pulse Rate     73 bpm                             Respiratory Rate          18 br/min                             Systolic Blood Pressure   143 mmHg  HI                             Diastolic Blood Pressure  87 mmHg     Measurements from flowsheet : Measurements   12/11/2017 11:43 CST     Weight Dosing             76.2 kg                             Weight Measured           76.2 kg                             Weight Measured and Calculated in Lbs     167.99 lb                             Height/Length Dosing      162 cm                             Height/Length Measured    162 cm                             BSA Measured              1.85 m2                              Body Mass Index Measured  29.04 kg/m2     General:  Alert and oriented, No acute distress.    Eye:  Pupils are equal, round and reactive to light, Extraocular movements are intact, Normal conjunctiva.    HENT:  Normocephalic, Oral mucosa is moist, No pharyngeal erythema, No sinus tenderness.    Neck:  Supple, Non-tender, No jugular venous distention, No lymphadenopathy, No thyromegaly.    Respiratory:  Lungs are clear to auscultation, Respirations are non-labored, Breath sounds are equal, Symmetrical chest wall expansion, No chest wall tenderness.    Cardiovascular:  Normal rate, Regular rhythm, No murmur, No gallop.    Gastrointestinal:  Soft, Non-tender, Non-distended, No organomegaly.    Genitourinary:  No costovertebral angle tenderness, No inguinal tenderness.    Lymphatics:  No lymphadenopathy neck, axilla, groin.    Neurologic:  Alert, Oriented, Normal sensory, Normal motor function, No focal deficits.    Cognition and Speech:  Oriented, Speech clear and coherent, Functional cognition intact.    Psychiatric:  Cooperative, Appropriate mood & affect, Normal judgment, Non-suicidal.       Review / Management   Results review:  Lab results   12/11/2017 10:48 CST     Sodium Lvl                143 mmol/L                             Potassium Lvl             4.2 mmol/L                             Chloride                  105 mmol/L                             CO2                       33 mmol/L  HI                             Calcium Lvl               9.0 mg/dL                             Glucose Lvl               78 mg/dL                             BUN                       8 mg/dL                             Creatinine                0.80 mg/dL                             eGFR-AA                   94 mL/min                             eGFR-ZEV                  78 mL/min  LOW                             Bili Total                0.3 mg/dL                             Bili Direct               <0.1 mg/dL                              Bili Indirect             Calc. not valid mg/dL                             AST                       25 unit/L                             ALT                       28 unit/L                             Alk Phos                  76 unit/L                             Total Protein             7.1 gm/dL                             Albumin Lvl               3.4 gm/dL                             Globulin                  3.70 gm/mL  HI                             A/G Ratio                 1 ratio                             LDH                       276 unit/L  HI                             WBC                       3.9 x10(3)/mcL  LOW                             RBC                       4.37 x10(6)/mcL                             Hgb                       12.0 gm/dL                             Hct                       37.2 %                             Platelet                  243 x10(3)/mcL                             MCV                       85.1 fL                             MCH                       27.5 pg                             MCHC                      32.3 gm/dL                             RDW                       14.7 %  HI                             MPV                       9.8 fL                             Abs Neut                  2.92 x10(3)/mcL                             Neutro Auto               74 x10(3)/mcL  NA                             Lymph Auto                14 %  LOW                             Mono Auto                 8 %                             Eos Auto                  1 %                             Abs Eos                   0.05 x10(3)/mcL  NA                             Basophil Auto             0 %                             Abs Neutro                2.92 x10(3)/mcL  NA                             Abs Lymph                 0.53 x10(3)/mcL  NA                             Abs Mono                  0.33 x10(3)/mcL  NA                             Abs  Baso                  0.02 x10(3)/mcL  NA                             IG%                       2 %  NA                             IG#                       0.0800  NA  .    Laboratory Results      Impression and Plan     1.  Stage IV follicular grade 1 non-Hodgkin's lymphoma; she has significant moderately symptomatic abdominal retroperitoneal lymph nodes, and clinical and PET scan identified osseous involvement;  bone marrow sampling + by flow cytometry only, no peripheral nodes, no obvious mediastinal involvement.  Due to symptomatic disease,  chemotherapy was recommended with bendamustine 2 consecutive days every 4 weeks for 6 cycles along with rituximab monthly for 6 months.       Completed 6 cycles of chemotherapy on 08/21/2017, with excellent response noted on subsequent CT scans.      Plan  From now on, she needs surveillance - follow-up every 3-6 months for 5 years (until August 2022), and then annually.  Surveillance CT scans are recommended not more frequently than every 6 months for the initial 2 years, and then not more frequently than annually subsequently, barring any symptoms or signs of recurrence in the interim.    Repeat contrast enhanced CT scans of the chest abdomen pelvis and soft tissues of the neck for surveillance in 3 months, followed by office visit with CBC, CMP, and LDH level.    No established role of maintenance Rituxan after chemotherapy with bendamustine/Rituxan.    Next screening mammogram will be due in June 2018.    Follow visit in 3 months, with labs.  She understands and agrees with this plan, and was appreciative.    She is complaining of some burning in urine.  No fevers or chills.  No flank pains.  No frequency of micturition.  Reports history of UTI in the past.  Will order UA and urine culture, and follow.

## 2022-04-30 NOTE — PROGRESS NOTES
Patient:   Jeane Cohen             MRN: [id number]            FIN: [id number]               Age:   62 years     Sex:  Female     :  1955   Associated Diagnoses:   None   Author:   Hill BABCOCK, Vanita Medina      Visit Information   Visit type:  Scheduled follow-up.    Accompanied by:  Spouse.    Source of history:  Self.    History limitation:  None.       Chief Complaint   follow up    Problem list:  1.  Stage IV follicular grade 1 non-Hodgkin's lymphoma, diagnosed on 2016    Current Treatment:  Bendamustine + Rituxan q4 weeks x 6 cycles. Started on 17.  Here for #5    Treatment History:    HPI:  62-year-old healthy  female with recently identified abdominal mesenteric adenopathy, with needle aspirate consistent with follicular non-Hodgkin's lymphoma, referred for evaluation management.  She saw Dr. Hayes of urology earlier this year for the same evaluation; she's never had gross hematuria.  As part of the evaluation, he performed a CT scan of the abdomen and pelvis, performed 2016; this showed rather prominent retroperitoneal and mesenteric lymph nodes.  She had follow-up in medicine clinic, and had blood test to include HIV negative, hepatitis panel negative, EBV negative.  She is gone on to have a CT-guided fine-needle aspirate left-sided retroperitoneal adenopathy performed on 2016.  This flow cytometry and cytology confirms a Light chain restricted B-cell nipple proliferation, that CD19 positive, CD20 positive, CD5 negative, and CD10 positive.  Interpretation is CD10 positive B-cell lymphoproliferative disorder, such as seen and follicular lymphoma.  Cytologically, these are described as small lymphocytes.      2017- s/p laparoscopy for biopsy of lymph nodes. Showed follicular lymphoma, grade 1.  ED positive B-cell lymphoproliferative disorder consistent with non-Hodgkin's lymphoma.      2017 8:54 CDT       NHL,ecog 1, no pain        Interval History   Patient presents  for continued treatment with BR, #5 scheduled today.  She is tolerating treatment well, with no significant complaints. Her counts have been adequate, however today she is mildly neutropenic, she denies fever.  She has mild occassional intermittent nausea, not real bothersome. She denies weight loss, fatigue and night sweats.       Review of Systems   A complete 12 point ROS was performed in full with pertinent positives as described in interval history. Remainder of ROS done in full and are negative.         Health Status   Allergies:    Allergic Reactions (Selected)  Severity Not Documented  Bee Stings- No reactions were documented.,    Allergies (1) Active Reaction  Bee Stings None Documented     Current medications.Problem list:    Active Problems (4)  Adenopathy   Follicular large cell non-Hodgkin's lymphoma   Lymphoma   Microhematuria         Histories   Past Medical History:    Resolved  Morbid obesity ([id number]):  Resolved on 9/20/2016 at 61 years.   Family History:    Primary malignant neoplasm of breast  Sister  Agent Orange  Brother  Hypertension.  Father  Mother     Procedure history:    Catheter Insertion Mediport (None) on 1/25/2017 at 61 Years.  Comments:  1/25/2017 12:13 - Gisel Dejesus RN  auto-populated from documented surgical case  Laparoscopy Exploratory (None) on 1/25/2017 at 61 Years.  Comments:  1/25/2017 12:13 - Gisel Dejesus RN  auto-populated from documented surgical case  Left sided retroperitonal adenopathies bx on 9/7/2016 at 61 Years.  Total hysterectomy ([id number]) in 2002 at 47 Years.  hysterectomy  2002.  tubal ligation.   Social History        Social & Psychosocial Habits    Alcohol  03/29/2016  Use: Never    Employment/School  03/29/2016  Status: Employed    Description: first studetn  aide on bus    Home/Environment  03/29/2016  Lives with: Spouse    Living situation: Home/Independent    Alcohol abuse in household: No    Substance abuse in household: No    Smoker in  household: No    Injuries/Abuse/Neglect in household: No    Feels unsafe at home: No    Substance Abuse  03/29/2016  Use: Never    Tobacco  03/29/2016  Use: Never smoker  .        Physical Examination   General:  No acute distress.    Eye:  Pupils are equal, round and reactive to light.    HENT:  Normocephalic.    Neck:  No jugular venous distention.    Respiratory:  Lungs are clear to auscultation.    Cardiovascular:  Normal rate, Regular rhythm, No murmur, Good pulses equal in all extremities, No edema.    Gastrointestinal:  Soft, Non-distended, Normal bowel sounds, mild tenderness below the umbilicus at biopsy site.    Genitourinary:  No costovertebral angle tenderness.    Vital Signs   6/22/2017 8:54 CDT       Temperature Oral          36.8 DegC                             Peripheral Pulse Rate     72 bpm                             Respiratory Rate          20 br/min                             SpO2                      99 %                             Systolic Blood Pressure   155 mmHg  HI                             Diastolic Blood Pressure  71 mmHg           ECOG performance status equals 1   Integumentary:  Warm, Dry, Intact, No rash.    Neurologic:  Alert, Oriented, No focal deficits.    Psychiatric:  Cooperative, Appropriate mood & affect.    Musculoskeletal:  L leg varicose veins with compression stockings.       Review / Management   Results review:  Lab results   6/22/2017 8:20 CDT       Sodium Lvl                141 mmol/L                             Potassium Lvl             3.6 mmol/L                             Chloride                  106 mmol/L                             CO2                       30 mmol/L                             Calcium Lvl               8.9 mg/dL                             Glucose Lvl               81 mg/dL                             BUN                       9 mg/dL                             Creatinine                0.61 mg/dL  LOW                              eGFR-AA                   >105 mL/min                             eGFR-ZEV                  >105 mL/min                             Bili Total                0.7 mg/dL                             Bili Direct               <0.1 mg/dL                             Bili Indirect             >0.6 mg/dL                             AST                       23 unit/L                             ALT                       20 unit/L                             Alk Phos                  79 unit/L                             Total Protein             6.7 gm/dL                             Albumin Lvl               3.8 gm/dL                             Globulin                  2.90 gm/mL                             A/G Ratio                 1  NA                             WBC                       2.4 x10(3)/mcL  LOW                             RBC                       4.75 x10(6)/mcL                             Hgb                       12.5 gm/dL                             Hct                       38.8 %                             Platelet                  207 x10(3)/mcL                             MCV                       81.7 fL                             MCH                       26.3 pg                             MCHC                      32.2 gm/dL                             RDW                       15.3 %  HI                             MPV                       10.1 fL                             Abs Neut                  1.04 x10(3)/mcL  LOW  .    Laboratory Results      Impression and Plan     1.  Stage IV follicular grade 1 non-Hodgkin's lymphoma; she has significant moderately symptomatic abdominal retroperitoneal lymph nodes, and clinical and PET scan identified osseous involvement;  bone marrow sampling + by flow cytometry only, no peripheral nodes, no obvious mediastinal involvement.  Due to symptomatic disease,  chemotherapy was recommended with bendamustine 2 consecutive days every 4 weeks for 6  cycles along with rituximab monthly for 6 months.    Imaging after 2 cyles shows complete response    2. Neutropenia    Plan  Bendamustine/Rituxan q 4 weeks for 6 cycles followed by monthly rituximab for 6 months  Delay cycle #5 for 1 week for neutropenia, pending labs with further dose reduction in bendamustine (63mg/m2)    RTC in 5 weeks (4 weeks from cycle #5) for cycle #6 with CBC, CMP, LDH

## 2022-05-02 NOTE — HISTORICAL [facility] CERNER
This is a historical note converted from [employer]. Formatting and pictures may have been removed.  Please reference [facility] for original formatting and attached multimedia. Chief Complaint  follicular lymphoma  History of Present Illness  Problem list:  1. ?Stage IV follicular grade 1 non-Hodgkins lymphoma, diagnosed on 9-  ?   Current Treatment:  Surveillance.  ???  Treatment History:  Bendamustine + Rituxan q4 weeks x 6 cycles. Started on 2/23/17. ?Finished 6 cycles on 08/21/2017.?  ???  HPI:  62-year-old healthy female with recently identified abdominal mesenteric adenopathy, with needle aspirate consistent with follicular non-Hodgkins lymphoma, referred for evaluation management. ?She saw Dr. Hayes of urology earlier this year for the same evaluation; shes never had gross hematuria. As part of the evaluation, he performed a CT scan of the abdomen and pelvis, performed 6/16/2016; this showed rather prominent retroperitoneal and mesenteric lymph nodes. ?She had follow-up in medicine clinic, and had blood test to include HIV negative, hepatitis panel negative, EBV negative. She is gone on to have a CT-guided fine-needle aspirate left-sided retroperitoneal adenopathy performed on 9/8/2016. ?This flow cytometry and cytology confirms a Light chain restricted B-cell nipple proliferation, that CD19 positive, CD20 positive, CD5 negative, and CD10 positive. ?Interpretation is CD10 positive B-cell lymphoproliferative disorder, such as seen and follicular lymphoma. ?Cytologically, these are described as small lymphocytes.??  ???  1/25/2017- s/p laparoscopy for biopsy of lymph nodes. Showed follicular lymphoma, grade 1. ?ED positive B-cell lymphoproliferative disorder consistent with non-Hodgkins lymphoma.  ???  Completed 6 cycles of chemotherapy with bendamustine/Rituxan on 08/21/2017, with excellent response noted on CT scans.?  ??????  08/16/2018:  Presents for follow-up visit, accompanied by her . ?Doing  very well. ?Good appetite. ?Good energy level. ?No weakness or fatigue. ?No recurrent fevers or drenching night sweats. ?No palpable lumps or masses. ?No chest pain, dyspnea, abdominal pain, nausea, vomiting, neurological symptoms, myalgias, abnormal bleeding, etc.  Labs reviewed. ?CMP unremarkable. ?, normal. ?Neutrophils 1890/mm?. ?Hemoglobin 11.2, stable. ?MCV 86.3, normal. ?White count 3800/mm?.  06/15/2018: Bilateral screening mammogram: Negative for malignancy.  Colonoscopy 0901 2017 for follow-up in respect of history of colon polyps, was unremarkable except for a single cecal diverticulum.  ??????  09/20/2018:  09/13/2018: Surveillance CT scans of chest/abdomen/pelvis: No evidence of lymphoma recurrence.  09/13/2018: Surveillance CT scan of soft tissues of the neck: No evidence of lymphoma recurrence.  09/20/2018: CMP unremarkable. ?, normal. White count 3800/mm?. ?Hemoglobin 11.4. Neutrophils 1900/mm?. ?Platelets 180,000.  ?????Presents for a follow-up visit, accompanied by her . ?Doing well. ?No weakness, fatigue, malaise, fevers, night sweats, anorexia, unintentional weight loss, lumps, masses, abdominal pain, nausea, vomiting, bleeding in any form or dyspnea, chest pain, etc. ?Has had right-sided occipital headaches intermittently for many months; not associated with any focal neurological symptoms, vomiting, vision problems, etc.  ?   01/22/2019:  -12/17/2018: Brain MRI?(headaches): Acute nonhemorrhagic right centrum semiovale 1.1 cm lacunar infarct; no evidence of lymphoma.  -01/22/2019: White count 3000.? Neutrophils 1290.? Hemoglobin 11.4.? Platelets 178,000.? CMP unremarkable.  Presents for follow-up visit, accompanied by her . ?Feels well. ?No weakness, fatigue, recurrent fevers, drenching night sweats, anorexia, or any palpable lumps.? No focal neurological symptoms like focal weakness or numbness, vision impairment, headaches,?or seizure activity.  ?    03/27/2019:  -03/20/2019: Surveillance CTs C/A/P/soft tissues of the neck with contrast: No evidence of recurrence of lymphoma.  -03/04/2019: , normal.? Chronic, mild hyperglobulinemia, globulins 3.9 g%.? CBC unremarkable except for very mild relative lymphocytosis.? Mild leukopenia, white count 3500/mm?.  Presents for follow-up?visit, coming by her . ?Doing?well.? Good appetite. ?Good energy level.? No new lumps or lymphadenopathy. ?No fatigue. ?No weakness. ?No anorexia or unintentional weight loss. ?No unusual headaches, focal neurological symptoms, cough, chest pain, dyspnea, abdominal pain, nausea, vomiting, GI bleeding, etc.  ?  Interval History?  7/9/19: Patient presents for follow?up on surveillance. She denies weakness, fatigue, night sweats, and fever without infection. She complains of?shortness of breath with activities that is more noticeable since?she completed?chemo. However, she states she was exercising during chemo, stopped exercising after completion of chemo, and is just resuming exercising. She could be experiencing exercise intolerance. She denies lymphadenopathy; no LAD noted on exam. CMP is stable. WBC low at 3.6 today. Hgb 11.8. Hct, plts normal. ANC 1550. Will have her follow up in 3 months with labs. She is amenable to this plan.  Review of Systems  A complete 12-point?ROS was performed in full with pertinent positives as described in interval history. Remainder of ROS done in full and are negative.  Physical Exam  Vitals & Measurements  T:?36.7? ?C (Oral)? HR:?66(Peripheral)? RR:?16? BP:?130/81? SpO2:?100%?  HT:?164?cm? WT:?75.4?kg? BMI:?28.03?  General: ?Alert and oriented, No acute distress.??  Appearance: Well-groomed  HEENT: ?Normocephalic, Oral mucosa is moist.?Pupils are equal, round and reactive to light, Extraocular movements are intact, Normal conjunctiva.?  Neck: ?Supple, Non-tender, No lymphadenopathy, No thyromegaly.??  Respiratory: ?Lungs are clear to  auscultation, Respirations are non-labored, Breath sounds are equal, Symmetrical chest wall expansion.??  Cardiovascular: ?Normal rate, Regular rhythm, No edema.??  Breast:??Breast exam not performed on todays visit.??  Gastrointestinal: ?Soft, Non-tender, Non-distended, Normal bowel sounds.??  Musculoskeletal: ?Normal strength.??  Integumentary: ?Warm, dry, intact.??  Neurologic: ?Alert, Oriented, No focal deficits, Cranial Nerves II-XII are grossly intact.??  Cognition and Speech: ?Oriented, Speech clear and coherent.??  Psychiatric: ?Cooperative, Appropriate mood & affect. ?  ECOG Performance Scale:?0 - Fully active; no restrictions.?  Assessment/Plan  1.?Follicular large cell non-Hodgkins lymphoma?C82.80  Impression:?  1. ?Stage IV follicular grade 1 non-Hodgkins lymphoma, diagnosed on 9-;?significant moderately symptomatic abdominal retroperitoneal lymph nodes, and clinical and PET scan ? identified osseous involvement; bone marrow sampling positive by flow cytometry only, no peripheral nodes, no obvious mediastinal involvement.??  -Due to symptomatic disease, treated with bendamustine + Rituxan q4 weeks x 6 cycles; started on 2/23/17, finished 6 cycles on 08/21/2017.??  (No established role of maintenance Rituxan after chemotherapy with bendamustine/Rituxan)-Excellent response noted on follow-up CT scans.?  -06/15/2018: Bilateral screening mammogram: Negative for malignancy.  -09/13/2018: Surveillance CT scans of chest/abdomen/pelvis/soft tissues of the neck: No evidence of lymphoma recurrence.  -12/17/2018: Brain MRI?(headaches): Acute nonhemorrhagic right centrum semiovale 1.1 cm lacunar infarct; no evidence of lymphoma.  -03/20/2019: Surveillance CTs?C/A/P/soft tissues of the neck:?No evidence of lymphoma recurrence.  ?   2. ?Health maintenance:  -Screening mammogram 06/15/2018, negative for malignancy.  -Colonoscopy 09/01/2017 (previous history of colon polyps), showing a single cecal  diverticulum.  ?   Plan:?  Continue surveillance?- follow-up?every 3-6 months for 5 years (until August 2022), and then annually.??  Follow up in 3 months with CBC, CMP, LDH.  Repeat contrast enhanced CT scans of the C/A/P & soft tissues of the neck for surveillance in 1 year (03/2020); earlier,?if any symptoms of concern?in the interim (recurrent fevers, drenching night sweats, unintentional weight loss, progressive weakness or fatigue, etc.)  ?  Per NCCN Guidelines FOLL-4:  H&P every 3-6 months for 5 years, then annually or as clinically indicated  Surveillance imaging  Up to 2 years: CT C/A/P scan with contrast no more than every 6 months  >?than 2 years: CT scan no more than annually  Ordered:  ?   Problem List/Past Medical History  Ongoing  Adenopathy  Follicular large cell non-Hodgkins lymphoma  Lymphoma  Microhematuria  Obesity  Historical  Morbid obesity  Procedure/Surgical History  Colonoscopy (09/01/2017)  Colonoscopy, flexible; diagnostic, including collection of specimen(s) by brushing or washing, when performed (separate procedure) (09/01/2017)  Inspection of Lower Intestinal Tract, Via Natural or Artificial Opening Endoscopic (09/01/2017)  Biopsy, abdominal or retroperitoneal mass, percutaneous needle (01/25/2017)  Catheter Insertion Mediport (None) (01/25/2017)  Excision of Retroperitoneum, Percutaneous Approach, Diagnostic (01/25/2017)  Insertion of Infusion Device into Right Internal Jugular Vein, Percutaneous Approach (01/25/2017)  Insertion of [place] into Chest Subcutaneous Tissue and Fascia, Open Approach (01/25/2017)  Insertion of tunneled centrally inserted central venous access device, with subcutaneous port; age 5 years or older (01/25/2017)  Laparoscopy Exploratory (None) (01/25/2017)  Left sided retroperitonal adenopathies bx (09/07/2016)  Computed tomography guidance for needle placement (eg, biopsy, aspiration, injection, localization device), radiological supervision and  interpretation (09/01/2016)  Computerized Tomography (CT Scan) of Pelvic Region (09/01/2016)  Drainage of Pelvis Lymphatic, Percutaneous Approach, Diagnostic (09/01/2016)  Fine needle aspiration; with imaging guidance (09/01/2016)  Total hysterectomy (2002)  hysterectomy 2002  tubal ligation   Medications  Calcium, Magnesium and Zinc oral tablet, 1 tab(s), Oral, Daily  Cod Liver Oil oral capsule, 1 cap(s), Oral, Daily,? ?Not taking  Fish Oil oral capsule, 1 cap(s), Oral, Daily  Heparin Flush 100 U/mL - 5 mL, 500 units= 5 mL, IV Push, Once-chemo  Heparin Flush 100 U/mL - 5 mL, 500 units= 5 mL, IV Push, Once-chemo  Heparin Flush 100 U/mL - 5 mL, 500 units= 5 mL, IV Push, Once-chemo  Heparin Flush 100 U/mL - 5 mL, 500 units= 5 mL, IV Push, Once-chemo  Heparin Flush 100 U/mL - 5 mL, 500 units= 5 mL, IV Push, Once-chemo  Heparin Flush 100 U/mL - 5 mL, 500 units= 5 mL, IV Push, Once-chemo  Heparin Flush 100 U/mL - 5 mL, 500 units= 5 mL, IV Push, Once-chemo  Heparin Flush 100 U/mL - 5 mL, 500 units= 5 mL, IV Push, Once-chemo  Heparin Flush 100 U/mL - 5 mL, 500 units= 5 mL, IV Push, Once-chemo  Heparin Flush 100 U/mL - 5 mL, 500 units= 5 mL, IV Push, Once-chemo  Heparin Flush 100 U/mL - 5 mL, 500 units= 5 mL, IV Push, Once-chemo  Heparin Flush 100 U/mL - 5 mL, 500 units= 5 mL, IV Push, Once-chemo  heparin flush 100 units/mL (500 Unit Flush), 500 units= 5 mL, IV Push, Once-chemo  Refresh Dry Eye Therapy, 1 drop(s), Eye-Both, BID  Vitamin C 250 mg oral tablet, 250 mg= 1 tab(s), Oral, Daily  Vitamin D3 5000 intl units oral tablet, 5000 IntUnit= 1 tab(s), Oral, Daily  Allergies  No Known Medication Allergies  Social History  Abuse/Neglect  No, 07/09/2019  Alcohol  Never, 03/29/2016  Employment/School  Unemployed, 02/22/2018  Exercise  Exercise frequency: 1-2 times/week. Exercise type: Walking., 02/22/2018  Home/Environment  Lives with Spouse. Living situation: Home/Independent. Alcohol abuse in household: No. Substance abuse in  household: No. Smoker in household: No. Injuries/Abuse/Neglect in household: No. Feels unsafe at home: No., 03/29/2016  Nutrition/Health  Regular, Sleeping concerns: No. Feels highly stressed: No., 02/22/2018  Sexual  Substance Use  Never, 03/29/2016  Tobacco  Never (less than 100 in lifetime), N/A, 07/09/2019  Never (less than 100 in lifetime), N/A, 06/18/2019  Family History  Agent Orange: Brother.  Hypertension.: Mother and Father.  Primary malignant neoplasm of breast: Sister.  Immunizations  Vaccine Date Status   tetanus/diphtheria/pertussis, acel(Tdap) 08/29/2016 Recorded   Health Maintenance  Health Maintenance  ???Pending?(in the next year)  ??? ??OverDue  ??? ? ? ?Diabetes Screening due??and every?  ??? ? ? ?Alcohol Misuse Screening due??01/01/19??and every 1??year(s)  ??? ??Due?  ??? ? ? ?Zoster Vaccine due??07/09/19??and every 100??year(s)  ??? ??Due In Future?  ??? ? ? ?Obesity Screening not due until??01/01/20??and every 1??year(s)  ??? ? ? ?ADL Screening not due until??01/15/20??and every 1??year(s)  ??? ? ? ?Aspirin Therapy for CVD Prevention not due until??01/22/20??and every 1??year(s)  ??? ? ? ?Blood Pressure Screening not due until??07/08/20??and every 1??year(s)  ??? ? ? ?Body Mass Index Check not due until??07/08/20??and every 1??year(s)  ??? ? ? ?Depression Screening not due until??07/08/20??and every 1??year(s)  ???Satisfied?(in the past 1 year)  ??? ??Satisfied?  ??? ? ? ?ADL Screening on??01/15/19.??Satisfied by Marleni Vu LPN  ??? ? ? ?Aspirin Therapy for CVD Prevention on??01/22/19.??Satisfied by Judie Fraser MD??Reason: Expectation Satisfied Elsewhere  ??? ? ? ?Blood Pressure Screening on??07/09/19.??Satisfied by Violet Colon RN  ??? ? ? ?Body Mass Index Check on??07/09/19.??Satisfied by Violet Colon RN  ??? ? ? ?Breast Cancer Screening on??06/18/19.??Satisfied by Danica Church  ??? ? ? ?Depression Screening on??07/09/19.??Satisfied by Isaiah LOUIS,  Violet LEAVITT  ??? ? ? ?Diabetes Screening on??07/09/19.??Satisfied by Neisha Gonzales  ??? ? ? ?Hypertension Management-Blood Pressure on??07/09/19.??Satisfied by Violet Colon RN.  ??? ? ? ?Hypertension Management-BMP on??07/09/19.??Satisfied by Neisha Gonzales  ??? ? ? ?Influenza Vaccine on??01/22/19.??Satisfied by Tamar Mckeon  ??? ? ? ?Lipid Screening on??03/04/19.??Satisfied by Ihsan Castano Jr.  ??? ? ? ?Obesity Screening on??07/09/19.??Satisfied by Violet Colon RN  ?  Lab Results  Test Name Test Result Date/Time   Sodium Lvl 143 mmol/L 07/09/2019 08:35 CDT   Potassium Lvl 3.7 mmol/L 07/09/2019 08:35 CDT   Chloride 108 mmol/L (High) 07/09/2019 08:35 CDT   CO2 30 mmol/L 07/09/2019 08:35 CDT   Calcium Lvl 8.7 mg/dL 07/09/2019 08:35 CDT   Glucose Lvl 94 mg/dL 07/09/2019 08:35 CDT   BUN 15 mg/dL 07/09/2019 08:35 CDT   Creatinine 0.80 mg/dL 07/09/2019 08:35 CDT   eGFR-AA 93 mL/min 07/09/2019 08:35 CDT   eGFR-ZEV 77 mL/min (Low) 07/09/2019 08:35 CDT   Bili Total 0.2 mg/dL 07/09/2019 08:35 CDT   Bili Direct <0.1 mg/dL 07/09/2019 08:35 CDT   Bili Indirect >0.1 mg/dL 07/09/2019 08:35 CDT   AST 17 unit/L 07/09/2019 08:35 CDT   ALT 20 unit/L 07/09/2019 08:35 CDT   Alk Phos 73 unit/L 07/09/2019 08:35 CDT   Total Protein 7.0 gm/dL 07/09/2019 08:35 CDT   Albumin Lvl 3.4 gm/dL 07/09/2019 08:35 CDT   Globulin 3.60 gm/mL (High) 07/09/2019 08:35 CDT   A/G Ratio 0.9 ratio (Low) 07/09/2019 08:35 CDT    unit/L 07/09/2019 08:35 CDT   WBC 3.6 x10(3)/mcL (Low) 07/09/2019 08:35 CDT   RBC 4.42 x10(6)/mcL 07/09/2019 08:35 CDT   Hgb 11.8 gm/dL (Low) 07/09/2019 08:35 CDT   Hct 38.6 % 07/09/2019 08:35 CDT   Platelet 182 x10(3)/mcL 07/09/2019 08:35 CDT   MCV 87.3 fL 07/09/2019 08:35 CDT   MCH 26.7 pg 07/09/2019 08:35 CDT   MCHC 30.6 gm/dL (Low) 07/09/2019 08:35 CDT   RDW 14.6 % (High) 07/09/2019 08:35 CDT   MPV 9.7 fL 07/09/2019 08:35 CDT   Abs Neut 1.55 x10(3)/mcL (Low) 07/09/2019 08:35 CDT   Neutro Auto  42 x10(3)/[facility] 07/09/2019 08:35 CDT   Lymph Auto 43 % (High) 07/09/2019 08:35 CDT   Mono Auto 12 % 07/09/2019 08:35 CDT   Eos Auto 2 07/09/2019 08:35 CDT   Abs Eos 0.07 x10(3)/[facility] 07/09/2019 08:35 CDT   Basophil Auto 0 07/09/2019 08:35 CDT   Abs Neutro 1.55 x10(3)/[facility] 07/09/2019 08:35 CDT   Abs Lymph 1.58 x10(3)/[facility] 07/09/2019 08:35 CDT   Abs Mono 0.43 x10(3)/[facility] 07/09/2019 08:35 CDT   Abs Baso 0.01 x10(3)/[facility] 07/09/2019 08:35 CDT   IG% 0 % 07/09/2019 08:35 CDT   IG# 0.0100 07/09/2019 08:35 CDT
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
## 2022-07-14 ENCOUNTER — TELEPHONE (OUTPATIENT)
Dept: INTERNAL MEDICINE | Facility: CLINIC | Age: 67
End: 2022-07-14
Payer: MEDICAID

## 2022-07-14 DIAGNOSIS — Z12.11 COLON CANCER SCREENING: Primary | ICD-10-CM

## 2022-08-01 ENCOUNTER — OFFICE VISIT (OUTPATIENT)
Dept: INTERNAL MEDICINE | Facility: CLINIC | Age: 67
End: 2022-08-01
Payer: MEDICARE

## 2022-08-01 VITALS
HEART RATE: 92 BPM | DIASTOLIC BLOOD PRESSURE: 81 MMHG | TEMPERATURE: 98 F | BODY MASS INDEX: 30.48 KG/M2 | HEIGHT: 63 IN | RESPIRATION RATE: 16 BRPM | SYSTOLIC BLOOD PRESSURE: 135 MMHG | WEIGHT: 172 LBS

## 2022-08-01 DIAGNOSIS — J06.9 UPPER RESPIRATORY TRACT INFECTION, UNSPECIFIED TYPE: Primary | ICD-10-CM

## 2022-08-01 DIAGNOSIS — R05.9 COUGH: ICD-10-CM

## 2022-08-01 PROBLEM — I25.10 ARTERIOSCLEROSIS OF CORONARY ARTERY: Status: ACTIVE | Noted: 2022-08-01

## 2022-08-01 PROBLEM — R31.29 MICROSCOPIC HEMATURIA: Status: ACTIVE | Noted: 2022-08-01

## 2022-08-01 PROBLEM — R73.03 PREDIABETES: Status: ACTIVE | Noted: 2022-08-01

## 2022-08-01 PROBLEM — E55.9 VITAMIN D DEFICIENCY: Status: ACTIVE | Noted: 2022-08-01

## 2022-08-01 PROBLEM — C82.80: Status: ACTIVE | Noted: 2022-08-01

## 2022-08-01 PROCEDURE — 3079F PR MOST RECENT DIASTOLIC BLOOD PRESSURE 80-89 MM HG: ICD-10-PCS | Mod: CPTII,,, | Performed by: NURSE PRACTITIONER

## 2022-08-01 PROCEDURE — 3008F PR BODY MASS INDEX (BMI) DOCUMENTED: ICD-10-PCS | Mod: CPTII,,, | Performed by: NURSE PRACTITIONER

## 2022-08-01 PROCEDURE — 1160F RVW MEDS BY RX/DR IN RCRD: CPT | Mod: CPTII,,, | Performed by: NURSE PRACTITIONER

## 2022-08-01 PROCEDURE — 1159F PR MEDICATION LIST DOCUMENTED IN MEDICAL RECORD: ICD-10-PCS | Mod: CPTII,,, | Performed by: NURSE PRACTITIONER

## 2022-08-01 PROCEDURE — 1101F PT FALLS ASSESS-DOCD LE1/YR: CPT | Mod: CPTII,,, | Performed by: NURSE PRACTITIONER

## 2022-08-01 PROCEDURE — 3079F DIAST BP 80-89 MM HG: CPT | Mod: CPTII,,, | Performed by: NURSE PRACTITIONER

## 2022-08-01 PROCEDURE — 1160F PR REVIEW ALL MEDS BY PRESCRIBER/CLIN PHARMACIST DOCUMENTED: ICD-10-PCS | Mod: CPTII,,, | Performed by: NURSE PRACTITIONER

## 2022-08-01 PROCEDURE — 99214 PR OFFICE/OUTPT VISIT, EST, LEVL IV, 30-39 MIN: ICD-10-PCS | Mod: S$PBB,,, | Performed by: NURSE PRACTITIONER

## 2022-08-01 PROCEDURE — 3075F SYST BP GE 130 - 139MM HG: CPT | Mod: CPTII,,, | Performed by: NURSE PRACTITIONER

## 2022-08-01 PROCEDURE — 99213 OFFICE O/P EST LOW 20 MIN: CPT | Mod: PBBFAC | Performed by: NURSE PRACTITIONER

## 2022-08-01 PROCEDURE — 1101F PR PT FALLS ASSESS DOC 0-1 FALLS W/OUT INJ PAST YR: ICD-10-PCS | Mod: CPTII,,, | Performed by: NURSE PRACTITIONER

## 2022-08-01 PROCEDURE — 3288F FALL RISK ASSESSMENT DOCD: CPT | Mod: CPTII,,, | Performed by: NURSE PRACTITIONER

## 2022-08-01 PROCEDURE — 3008F BODY MASS INDEX DOCD: CPT | Mod: CPTII,,, | Performed by: NURSE PRACTITIONER

## 2022-08-01 PROCEDURE — 1159F MED LIST DOCD IN RCRD: CPT | Mod: CPTII,,, | Performed by: NURSE PRACTITIONER

## 2022-08-01 PROCEDURE — 3288F PR FALLS RISK ASSESSMENT DOCUMENTED: ICD-10-PCS | Mod: CPTII,,, | Performed by: NURSE PRACTITIONER

## 2022-08-01 PROCEDURE — 3075F PR MOST RECENT SYSTOLIC BLOOD PRESS GE 130-139MM HG: ICD-10-PCS | Mod: CPTII,,, | Performed by: NURSE PRACTITIONER

## 2022-08-01 PROCEDURE — 99214 OFFICE O/P EST MOD 30 MIN: CPT | Mod: S$PBB,,, | Performed by: NURSE PRACTITIONER

## 2022-08-01 RX ORDER — MAGNESIUM 30 MG
TABLET ORAL ONCE
COMMUNITY

## 2022-08-01 RX ORDER — MULTIVIT WITH MINERALS/HERBS
1 TABLET ORAL DAILY
COMMUNITY

## 2022-08-01 RX ORDER — ZINC GLUCONATE 50 MG
50 TABLET ORAL DAILY
COMMUNITY

## 2022-08-01 RX ORDER — AMOXICILLIN 875 MG/1
875 TABLET, FILM COATED ORAL 2 TIMES DAILY
Qty: 20 TABLET | Refills: 0 | Status: SHIPPED | OUTPATIENT
Start: 2022-08-01 | End: 2022-08-11

## 2022-08-01 RX ORDER — PROMETHAZINE HYDROCHLORIDE AND DEXTROMETHORPHAN HYDROBROMIDE 6.25; 15 MG/5ML; MG/5ML
5 SYRUP ORAL EVERY 4 HOURS PRN
Qty: 120 ML | Refills: 1 | Status: SHIPPED | OUTPATIENT
Start: 2022-08-01 | End: 2022-08-11

## 2022-08-01 RX ORDER — PREDNISONE 10 MG/1
10 TABLET ORAL DAILY
Qty: 5 TABLET | Refills: 0 | Status: SHIPPED | OUTPATIENT
Start: 2022-08-01 | End: 2022-08-06

## 2022-08-01 RX ORDER — CHOLECALCIFEROL (VITAMIN D3) 25 MCG
1000 TABLET ORAL DAILY
COMMUNITY

## 2022-08-01 NOTE — ASSESSMENT & PLAN NOTE
RX Amoxicillin 875 mg BID x 10 days  RX Prednisone 10 mg daily x 5 days  RX Promethazine DM prn cough  Increase PO Fluids, rest, cough drops, gargle with salt water   F/U PRN

## 2022-08-01 NOTE — PROGRESS NOTES
KARLY Leija   [facility]  [address] [address]      PATIENT NAME: Jeane Cohen  : 1955  DATE: 22  MRN: [id number]      Billing Provider: KARLY Leija  Level of Service:   Patient PCP Information     Provider PCP Type    KARLY Leija General          Reason for Visit / Chief Complaint: Follow-up (cough)       History of Present Illness / Problem Focused Workflow     Jeane Cohen presents to the clinic with Follow-up (cough)     67 yo AAF here today w/ complaints of cough. PMH Stage IV follicular Grade 1 non-Hodgkins lymphoma, diagnosed 2016, CAD, chronic hoarseness, chronic microscopic hematuria since , prediabetes, and Vitamin D deficiency.  Followed by Dr. Mace, [facility], Cardiology, & Urology Clinic. Jehovah Witness.  Never smoker.  Pt reports that she was tested for COVID over the weekend, she reports that she has a runny nose, sore throat, and congestion. Pt reports they did not give her any medications at that time. Pt denies any fever, reports OTC meds not working; will treat for a URI today. Pt instructed to f/u at [facility] near her home if no improvement in the next few days. Aware of our scheduled routine f/u next year, denies any other issues or concerns today.       Review of Systems     Review of Systems   Constitutional: Negative.    HENT: Positive for rhinorrhea and sore throat.    Eyes: Negative.    Respiratory: Positive for cough.    Cardiovascular: Negative.    Gastrointestinal: Negative.    Endocrine: Negative.    Genitourinary: Negative.    Neurological: Negative.    Psychiatric/Behavioral: Negative.        Medical / Social / Family History     Past Medical History:   Diagnosis Date    Abnormal cervical Papanicolaou smear     Chronic hoarseness     Coronary arteriosclerosis     Large cell, follicular non-Hodgkin's lymphoma     Microscopic hematuria     Prediabetes      Vitamin D deficiency        Past Surgical History:   Procedure Laterality Date    catheter insertion mediport  01/25/2017    COLONOSCOPY  09/01/2017    HYSTERECTOMY  2002    Laparoscopy exploratory  01/25/2017    removal of mediport  06/10/2019    TUBAL LIGATION         Social History  Ms.  reports that she has never smoked. She has never used smokeless tobacco. She reports that she does not drink alcohol and does not use drugs.    Family History  Ms.'s family history includes Heart disease in her mother; Hypertension in her father and mother; Stroke in her father.    Medications and Allergies     Medications  Medication List with Changes/Refills   Current Medications    B COMPLEX VITAMINS TABLET    Take 1 tablet by mouth once daily.    CALCIUM CARBONATE 1250 MG CAPSULE    Take 1,250 mg by mouth 2 (two) times daily with meals.    MAGNESIUM 30 MG TAB    Take by mouth once.    MULTIVITAMIN CAPSULE    Take 1 capsule by mouth once daily.    VITAMIN D (VITAMIN D3) 1000 UNITS TAB    Take 1,000 Units by mouth once daily.    ZINC GLUCONATE 50 MG TABLET    Take 50 mg by mouth once daily.        Allergies  Review of patient's allergies indicates:  No Known Allergies    Physical Examination     Vitals:    08/01/22 1217   BP: 135/81   Pulse: 92   Resp: 16   Temp: 98.1 °F (36.7 °C)     Physical Exam  Vitals reviewed.   Constitutional:       Appearance: Normal appearance. She is normal weight.   HENT:      Head: Normocephalic.   Cardiovascular:      Rate and Rhythm: Normal rate and regular rhythm.      Pulses: Normal pulses.      Heart sounds: Normal heart sounds.   Pulmonary:      Effort: Pulmonary effort is normal.      Breath sounds: Normal breath sounds.   Abdominal:      General: Abdomen is flat.      Palpations: Abdomen is soft.   Musculoskeletal:         General: Normal range of motion.      Cervical back: Normal range of motion.   Skin:     General: Skin is warm and dry.   Neurological:      Mental Status: She is  alert.   Psychiatric:         Mood and Affect: Mood normal.           Results     Lab Results   Component Value Date    WBC 4.1 02/28/2022    RBC 4.41 02/28/2022    HGB 11.8 02/28/2022    HCT 38.0 02/28/2022    MCV 86.2 02/28/2022    MCH 26.8 02/28/2022    MCHC 31.1 02/28/2022    RDW 14.7 02/28/2022     02/28/2022    MPV 10.2 02/28/2022     CMP  Sodium Level   Date Value Ref Range Status   02/28/2022 141 136 - 145      Potassium Level   Date Value Ref Range Status   02/28/2022 3.8 3.5 - 5.1      Carbon Dioxide   Date Value Ref Range Status   02/28/2022 32 23 - 31      Blood Urea Nitrogen   Date Value Ref Range Status   02/28/2022 6.7 9.8 - 20.1      Creatinine   Date Value Ref Range Status   02/28/2022 0.75 0.55 - 1.02      Calcium Level Total   Date Value Ref Range Status   02/28/2022 9.2 8.7 - 10.5      Albumin Level   Date Value Ref Range Status   02/28/2022 3.7 3.4 - 4.8      Bilirubin Total   Date Value Ref Range Status   02/28/2022 0.4 <=1.5      Alkaline Phosphatase   Date Value Ref Range Status   02/28/2022 60 40 - 150      Aspartate Aminotransferase   Date Value Ref Range Status   02/28/2022 17 5 - 34      Alanine Aminotransferase   Date Value Ref Range Status   02/28/2022 15 0 - 55      Estimated GFR-Non    Date Value Ref Range Status   02/28/2022 82 >=90      No results found for: CHOL  No results found for: HDL  No results found for: LDLCALC  No results found for: TRIG  No results found for: CHOLHDL  No results found for: TSH  No results found for: PHUR, SPECGRAV, PROTEINUA, GLUCUA, KETONESU, OCCULTUA, NITRITE, LEUKOCYTESUR        Assessment and Plan (including Health Maintenance)     Plan:         Health Maintenance Due   Topic Date Due    Hepatitis C Screening  Never done    Lipid Panel  Never done    COVID-19 Vaccine (1) Never done    Pneumococcal Vaccines (Age 65+) (1 - PCV) Never done    Shingles Vaccine (1 of 2) Never done    Colorectal Cancer Screening  Never done        Problem List Items Addressed This Visit        Pulmonary    Cough          Health Maintenance Topics with due status: Not Due       Topic Last Completion Date    TETANUS VACCINE 08/29/2016    Mammogram 02/08/2022    DEXA Scan 02/08/2022    Influenza Vaccine Not Due       Future Appointments   Date Time Provider Department Center   1/3/2023  8:15 AM KARLY Leija [facility] INTMED [place]   2/13/2023  7:00 AM [facility] CT1 450 LB LIMIT [facility] CTSCN [place]   2/13/2023  8:00 AM [facility] CT1 450 LB LIMIT [facility] CTSCN [place]   2/13/2023  9:30 AM [facility] MAMMO2 [facility] MAMMO [place]   2/14/2023  9:10 AM KARLY Joy [facility] GYN [place]   2/20/2023 10:20 AM NURSE, [facility] HEMATOLOGY ONCOLOGY [facility] HEMOMC [place]   2/20/2023 11:20 AM [facility]            Signature:     [facility]  [address] [address]    Date of encounter: 8/1/22     I spent a total of 35 minutes on the day of the visit.  This includes face to face time and non-face to face time preparing to see the patient (eg, review of tests), obtaining and/or reviewing separately obtained history, documenting clinical information in the electronic or other health record, independently interpreting results and communicating results to the patient/family/caregiver, or care coordinator.

## 2022-08-19 ENCOUNTER — TELEPHONE (OUTPATIENT)
Dept: ENDOSCOPY | Facility: HOSPITAL | Age: 67
End: 2022-08-19

## 2022-09-01 ENCOUNTER — DOCUMENTATION ONLY (OUTPATIENT)
Dept: ADMINISTRATIVE | Facility: HOSPITAL | Age: 67
End: 2022-09-01
Payer: MEDICARE

## 2023-01-03 ENCOUNTER — LAB VISIT (OUTPATIENT)
Dept: LAB | Facility: HOSPITAL | Age: 68
End: 2023-01-03
Attending: NURSE PRACTITIONER
Payer: MEDICARE

## 2023-01-03 DIAGNOSIS — E55.9 VITAMIN D DEFICIENCY: ICD-10-CM

## 2023-01-03 DIAGNOSIS — R73.03 PREDIABETES: ICD-10-CM

## 2023-01-03 DIAGNOSIS — J06.9 UPPER RESPIRATORY TRACT INFECTION, UNSPECIFIED TYPE: ICD-10-CM

## 2023-01-03 DIAGNOSIS — R31.29 MICROSCOPIC HEMATURIA: ICD-10-CM

## 2023-01-03 DIAGNOSIS — Z00.00 WELLNESS EXAMINATION: Primary | ICD-10-CM

## 2023-01-03 LAB
ALBUMIN SERPL-MCNC: 3.7 G/DL (ref 3.4–4.8)
ALBUMIN/GLOB SERPL: 1.1 RATIO (ref 1.1–2)
ALP SERPL-CCNC: 68 UNIT/L (ref 40–150)
ALT SERPL-CCNC: 17 UNIT/L (ref 0–55)
APPEARANCE UR: CLEAR
AST SERPL-CCNC: 21 UNIT/L (ref 5–34)
BACTERIA #/AREA URNS AUTO: ABNORMAL /HPF
BASOPHILS # BLD AUTO: 0.03 X10(3)/MCL (ref 0–0.2)
BASOPHILS NFR BLD AUTO: 0.7 %
BILIRUB UR QL STRIP.AUTO: NEGATIVE MG/DL
BILIRUBIN DIRECT+TOT PNL SERPL-MCNC: 0.4 MG/DL
BUN SERPL-MCNC: 9.6 MG/DL (ref 9.8–20.1)
CALCIUM SERPL-MCNC: 9.2 MG/DL (ref 8.4–10.2)
CHLORIDE SERPL-SCNC: 108 MMOL/L (ref 98–107)
CHOLEST SERPL-MCNC: 128 MG/DL
CHOLEST/HDLC SERPL: 2 {RATIO} (ref 0–5)
CO2 SERPL-SCNC: 29 MMOL/L (ref 23–31)
COLOR UR AUTO: ABNORMAL
CREAT SERPL-MCNC: 0.82 MG/DL (ref 0.55–1.02)
DEPRECATED CALCIDIOL+CALCIFEROL SERPL-MC: 42.8 NG/ML (ref 30–80)
EOSINOPHIL # BLD AUTO: 0.15 X10(3)/MCL (ref 0–0.9)
EOSINOPHIL NFR BLD AUTO: 3.7 %
ERYTHROCYTE [DISTWIDTH] IN BLOOD BY AUTOMATED COUNT: 14.9 % (ref 11–14.5)
EST. AVERAGE GLUCOSE BLD GHB EST-MCNC: 119.8 MG/DL
GFR SERPLBLD CREATININE-BSD FMLA CKD-EPI: 79 MLS/MIN/1.73/M2
GLOBULIN SER-MCNC: 3.5 GM/DL (ref 2.4–3.5)
GLUCOSE SERPL-MCNC: 74 MG/DL (ref 82–115)
GLUCOSE UR QL STRIP.AUTO: NORMAL MG/DL
HBA1C MFR BLD: 5.8 %
HCT VFR BLD AUTO: 39.3 % (ref 37–47)
HDLC SERPL-MCNC: 58 MG/DL (ref 35–60)
HGB BLD-MCNC: 12.1 GM/DL (ref 12–16)
HYALINE CASTS #/AREA URNS LPF: ABNORMAL /LPF
IMM GRANULOCYTES # BLD AUTO: 0.01 X10(3)/MCL (ref 0–0.04)
IMM GRANULOCYTES NFR BLD AUTO: 0.2 %
KETONES UR QL STRIP.AUTO: NEGATIVE MG/DL
LDLC SERPL CALC-MCNC: 61 MG/DL (ref 50–140)
LEUKOCYTE ESTERASE UR QL STRIP.AUTO: 75 UNIT/L
LYMPHOCYTES # BLD AUTO: 1.82 X10(3)/MCL (ref 0.6–4.6)
LYMPHOCYTES NFR BLD AUTO: 44.9 %
MCH RBC QN AUTO: 26.2 PG
MCHC RBC AUTO-ENTMCNC: 30.8 MG/DL (ref 33–36)
MCV RBC AUTO: 85.2 FL (ref 80–94)
MONOCYTES # BLD AUTO: 0.39 X10(3)/MCL (ref 0.1–1.3)
MONOCYTES NFR BLD AUTO: 9.6 %
MUCOUS THREADS URNS QL MICRO: ABNORMAL /LPF
NEUTROPHILS # BLD AUTO: 1.65 X10(3)/MCL (ref 2.1–9.2)
NEUTROPHILS NFR BLD AUTO: 40.9 %
NITRITE UR QL STRIP.AUTO: NEGATIVE
NRBC BLD AUTO-RTO: 0 % (ref 0–1)
PH UR STRIP.AUTO: 7 [PH]
PLATELET # BLD AUTO: 226 X10(3)/MCL (ref 140–371)
PMV BLD AUTO: 11.1 FL (ref 9.4–12.4)
POTASSIUM SERPL-SCNC: 4.5 MMOL/L (ref 3.5–5.1)
PROT SERPL-MCNC: 7.2 GM/DL (ref 5.8–7.6)
PROT UR QL STRIP.AUTO: NEGATIVE MG/DL
RBC # BLD AUTO: 4.61 X10(6)/MCL (ref 4.2–5.4)
RBC #/AREA URNS AUTO: ABNORMAL /HPF
RBC UR QL AUTO: ABNORMAL UNIT/L
SODIUM SERPL-SCNC: 144 MMOL/L (ref 136–145)
SP GR UR STRIP.AUTO: 1.01
SQUAMOUS #/AREA URNS LPF: ABNORMAL /HPF
T4 FREE SERPL-MCNC: 0.91 NG/DL (ref 0.7–1.48)
TRIGL SERPL-MCNC: 44 MG/DL (ref 37–140)
TSH SERPL-ACNC: 0.5 UIU/ML (ref 0.35–4.94)
UROBILINOGEN UR STRIP-ACNC: NORMAL MG/DL
VLDLC SERPL CALC-MCNC: 9 MG/DL
WBC # SPEC AUTO: 4.1 X10(3)/MCL (ref 4.5–11.5)
WBC #/AREA URNS AUTO: ABNORMAL /HPF

## 2023-01-03 PROCEDURE — 36415 COLL VENOUS BLD VENIPUNCTURE: CPT

## 2023-01-03 PROCEDURE — 80053 COMPREHEN METABOLIC PANEL: CPT

## 2023-01-03 PROCEDURE — 81001 URINALYSIS AUTO W/SCOPE: CPT

## 2023-01-03 PROCEDURE — 80061 LIPID PANEL: CPT

## 2023-01-03 PROCEDURE — 85025 COMPLETE CBC W/AUTO DIFF WBC: CPT

## 2023-01-03 PROCEDURE — 84443 ASSAY THYROID STIM HORMONE: CPT

## 2023-01-03 PROCEDURE — 82306 VITAMIN D 25 HYDROXY: CPT

## 2023-01-03 PROCEDURE — 83036 HEMOGLOBIN GLYCOSYLATED A1C: CPT

## 2023-01-03 PROCEDURE — 84439 ASSAY OF FREE THYROXINE: CPT

## 2023-01-04 RX ORDER — BACLOFEN 20 MG/1
20 TABLET ORAL EVERY 8 HOURS PRN
Status: ON HOLD | COMMUNITY
Start: 2022-10-14 | End: 2023-09-25 | Stop reason: HOSPADM

## 2023-01-04 RX ORDER — IBUPROFEN 600 MG/1
600 TABLET ORAL EVERY 6 HOURS PRN
Status: ON HOLD | COMMUNITY
Start: 2022-10-14 | End: 2023-09-25 | Stop reason: HOSPADM

## 2023-01-04 RX ORDER — MELOXICAM 7.5 MG/1
7.5 TABLET ORAL
COMMUNITY
Start: 2022-08-28 | End: 2023-09-15 | Stop reason: SDUPTHER

## 2023-01-04 RX ORDER — MELOXICAM 7.5 MG/1
TABLET ORAL
Status: ON HOLD | COMMUNITY
Start: 2022-08-29 | End: 2023-09-25 | Stop reason: HOSPADM

## 2023-01-04 RX ORDER — BACLOFEN 10 MG/1
TABLET ORAL
COMMUNITY
Start: 2022-08-29 | End: 2023-09-15

## 2023-01-05 ENCOUNTER — OFFICE VISIT (OUTPATIENT)
Dept: INTERNAL MEDICINE | Facility: CLINIC | Age: 68
End: 2023-01-05
Payer: MEDICARE

## 2023-01-05 VITALS
DIASTOLIC BLOOD PRESSURE: 77 MMHG | WEIGHT: 167 LBS | HEIGHT: 63 IN | SYSTOLIC BLOOD PRESSURE: 133 MMHG | HEART RATE: 67 BPM | TEMPERATURE: 98 F | BODY MASS INDEX: 29.59 KG/M2

## 2023-01-05 DIAGNOSIS — H00.011 HORDEOLUM EXTERNUM OF RIGHT UPPER EYELID: ICD-10-CM

## 2023-01-05 DIAGNOSIS — E55.9 VITAMIN D DEFICIENCY: ICD-10-CM

## 2023-01-05 DIAGNOSIS — C82.80 FOLLICULAR LARGE CELL NON-HODGKIN'S LYMPHOMA: ICD-10-CM

## 2023-01-05 DIAGNOSIS — K63.5 POLYP OF COLON, UNSPECIFIED PART OF COLON, UNSPECIFIED TYPE: ICD-10-CM

## 2023-01-05 DIAGNOSIS — R73.03 PREDIABETES: Primary | ICD-10-CM

## 2023-01-05 DIAGNOSIS — R22.1 NECK MASS: ICD-10-CM

## 2023-01-05 DIAGNOSIS — Z00.00 WELLNESS EXAMINATION: ICD-10-CM

## 2023-01-05 PROCEDURE — 3008F PR BODY MASS INDEX (BMI) DOCUMENTED: ICD-10-PCS | Mod: CPTII,,, | Performed by: NURSE PRACTITIONER

## 2023-01-05 PROCEDURE — 3075F PR MOST RECENT SYSTOLIC BLOOD PRESS GE 130-139MM HG: ICD-10-PCS | Mod: CPTII,,, | Performed by: NURSE PRACTITIONER

## 2023-01-05 PROCEDURE — 1160F PR REVIEW ALL MEDS BY PRESCRIBER/CLIN PHARMACIST DOCUMENTED: ICD-10-PCS | Mod: CPTII,,, | Performed by: NURSE PRACTITIONER

## 2023-01-05 PROCEDURE — 99214 OFFICE O/P EST MOD 30 MIN: CPT | Mod: PBBFAC | Performed by: NURSE PRACTITIONER

## 2023-01-05 PROCEDURE — 3008F BODY MASS INDEX DOCD: CPT | Mod: CPTII,,, | Performed by: NURSE PRACTITIONER

## 2023-01-05 PROCEDURE — 99214 OFFICE O/P EST MOD 30 MIN: CPT | Mod: S$PBB,,, | Performed by: NURSE PRACTITIONER

## 2023-01-05 PROCEDURE — 1159F PR MEDICATION LIST DOCUMENTED IN MEDICAL RECORD: ICD-10-PCS | Mod: CPTII,,, | Performed by: NURSE PRACTITIONER

## 2023-01-05 PROCEDURE — 3078F PR MOST RECENT DIASTOLIC BLOOD PRESSURE < 80 MM HG: ICD-10-PCS | Mod: CPTII,,, | Performed by: NURSE PRACTITIONER

## 2023-01-05 PROCEDURE — 1160F RVW MEDS BY RX/DR IN RCRD: CPT | Mod: CPTII,,, | Performed by: NURSE PRACTITIONER

## 2023-01-05 PROCEDURE — 3078F DIAST BP <80 MM HG: CPT | Mod: CPTII,,, | Performed by: NURSE PRACTITIONER

## 2023-01-05 PROCEDURE — 99214 PR OFFICE/OUTPT VISIT, EST, LEVL IV, 30-39 MIN: ICD-10-PCS | Mod: S$PBB,,, | Performed by: NURSE PRACTITIONER

## 2023-01-05 PROCEDURE — 3075F SYST BP GE 130 - 139MM HG: CPT | Mod: CPTII,,, | Performed by: NURSE PRACTITIONER

## 2023-01-05 PROCEDURE — 1159F MED LIST DOCD IN RCRD: CPT | Mod: CPTII,,, | Performed by: NURSE PRACTITIONER

## 2023-01-05 RX ORDER — BACITRACIN ZINC AND POLYMYXIN B SULFATE 500; 10000 [USP'U]/G; [USP'U]/G
OINTMENT OPHTHALMIC 2 TIMES DAILY
Qty: 3.5 G | Refills: 1 | Status: SHIPPED | OUTPATIENT
Start: 2023-01-05 | End: 2023-01-09 | Stop reason: SDUPTHER

## 2023-01-05 NOTE — ASSESSMENT & PLAN NOTE
Educated on increasing foods high in Vitamin D such as fish oil, cod liver oil, salmon, milk fortified with vitamin D. Vitamin D 2000 I.U. tablets daily (purchase over the counter).  Repeat Vitamin D level as ordered.

## 2023-01-05 NOTE — PROGRESS NOTES
Dayanna Treviño, KARLY   [facility]  [address] [facility] [address]      PATIENT NAME: Jeane Cohen  : 1955  DATE: 23  MRN: [id number]      Reason for Visit / Chief Complaint: Follow-up (Lab review)       History of Present Illness / Problem Focused Workflow     Jeane Cohen presents to the clinic with Follow-up (Lab review)     66 yo AAF here today for routine f/u. PMH Stage IV follicular Grade 1 non-Hodgkins lymphoma, diagnosed 2016, CAD, chronic hoarseness, chronic microscopic hematuria since , prediabetes, and Vitamin D deficiency.  Followed by Dr. Mace, [facility], Cardiology, & Urology Clinic. Jehovah Witness.  Never smoker.      Cervical Cancer Screening: [facility]  Breast Cancer Screenin23 MMG   Colon Cancer Screenin23 Referral to [facility] GI Lab - 5 yr rescope  Osteoporosis Screenin22 DEXA. 23 Vit D Level 42.8    23   Pt here today for routine f/u, labs completed and reviewed with no questions or concerns at this time. Pt reports with a lump to her neck area, she was seen [facility] ED in August for cervical radiculopathy. Pt agreeable to soft tissue US today of the neck. Evaluated cyst like mass to anterior neck area, agreeable to soft tissue US. Also repots with a stye to her right eye, discussed warm compress and good hand washing, will also sent opthalmic ointment for BID use. Denies any other issues today; will f/u in 1 year for routine wellness. Agreeable to referral to [facility] GI lab for repeat colonoscopy.   Denies chest pain, shortness of breath, cough, fever, headache, dizziness, weakness, abdominal pain, nausea, vomiting, diarrhea, constipation, black/bloody stools, unplanned weight loss, night sweats, changes in urinary patterns, burning/odor with urination, depression, anxiety, and SI/HI.       Follow-up      Review of Systems     Review of Systems   Constitutional: Negative.     HENT: Negative.     Eyes: Negative.    Respiratory: Negative.     Cardiovascular: Negative.    Gastrointestinal: Negative.    Endocrine: Negative.    Genitourinary: Negative.    Neurological: Negative.    Psychiatric/Behavioral: Negative.         Medications and Allergies     Medications  Medication List with Changes/Refills   New Medications    BACITRACIN-POLYMYXIN B (POLYSPORIN) OPHTHALMIC OINTMENT    Place into the right eye 2 (two) times daily. for 7 days   Current Medications    B COMPLEX VITAMINS TABLET    Take 1 tablet by mouth once daily.    BACLOFEN (LIORESAL) 10 MG TABLET        BACLOFEN (LIORESAL) 20 MG TABLET    Take 20 mg by mouth every 8 (eight) hours as needed.    CALCIUM CARBONATE 1250 MG CAPSULE    Take 1,250 mg by mouth 2 (two) times daily with meals.    IBUPROFEN (ADVIL,MOTRIN) 600 MG TABLET    Take 600 mg by mouth every 6 (six) hours as needed.    MAGNESIUM 30 MG TAB    Take by mouth once.    MELOXICAM (MOBIC) 7.5 MG TABLET        MELOXICAM (MOBIC) 7.5 MG TABLET    Take 7.5 mg by mouth.    MULTIVITAMIN CAPSULE    Take 1 capsule by mouth once daily.    VITAMIN D (VITAMIN D3) 1000 UNITS TAB    Take 1,000 Units by mouth once daily.    ZINC GLUCONATE 50 MG TABLET    Take 50 mg by mouth once daily.         Allergies  Review of patient's allergies indicates:  No Known Allergies    Physical Examination     Vitals:    01/05/23 1343   BP: 133/77   Pulse: 67   Temp: 98.1 °F (36.7 °C)     Physical Exam  Vitals reviewed.   Constitutional:       Appearance: Normal appearance. She is normal weight.   HENT:      Head: Normocephalic.   Cardiovascular:      Rate and Rhythm: Normal rate and regular rhythm.      Pulses: Normal pulses.      Heart sounds: Normal heart sounds.   Pulmonary:      Effort: Pulmonary effort is normal.      Breath sounds: Normal breath sounds.   Abdominal:      General: Abdomen is flat.      Palpations: Abdomen is soft.   Musculoskeletal:         General: Normal range of motion.       Cervical back: Normal range of motion.   Skin:     General: Skin is warm and dry.   Neurological:      Mental Status: She is alert.   Psychiatric:         Mood and Affect: Mood normal.         Results     Lab Results   Component Value Date    WBC 4.1 (L) 01/03/2023    RBC 4.61 01/03/2023    HGB 12.1 01/03/2023    HCT 39.3 01/03/2023    MCV 85.2 01/03/2023    MCH 26.2 01/03/2023    MCHC 30.8 (L) 01/03/2023    RDW 14.9 (H) 01/03/2023     01/03/2023    MPV 11.1 01/03/2023     Sodium Level   Date Value Ref Range Status   01/03/2023 144 136 - 145 mmol/L Final     Potassium Level   Date Value Ref Range Status   01/03/2023 4.5 3.5 - 5.1 mmol/L Final     Carbon Dioxide   Date Value Ref Range Status   01/03/2023 29 23 - 31 mmol/L Final     Blood Urea Nitrogen   Date Value Ref Range Status   01/03/2023 9.6 (L) 9.8 - 20.1 mg/dL Final     Creatinine   Date Value Ref Range Status   01/03/2023 0.82 0.55 - 1.02 mg/dL Final     Calcium Level Total   Date Value Ref Range Status   01/03/2023 9.2 8.4 - 10.2 mg/dL Final     Albumin Level   Date Value Ref Range Status   01/03/2023 3.7 3.4 - 4.8 g/dL Final     Bilirubin Total   Date Value Ref Range Status   01/03/2023 0.4 <=1.5 mg/dL Final     Alkaline Phosphatase   Date Value Ref Range Status   01/03/2023 68 40 - 150 unit/L Final     Aspartate Aminotransferase   Date Value Ref Range Status   01/03/2023 21 5 - 34 unit/L Final     Alanine Aminotransferase   Date Value Ref Range Status   01/03/2023 17 0 - 55 unit/L Final     Estimated GFR-Non    Date Value Ref Range Status   02/28/2022 82 >=90      Lab Results   Component Value Date    CHOL 128 01/03/2023     Lab Results   Component Value Date    HDL 58 01/03/2023     Lab Results   Component Value Date    LDLCALC 69 12/27/2021     Lab Results   Component Value Date    TRIG 44 01/03/2023     No results found for: CHOLHDL  Lab Results   Component Value Date    TSH 0.502 01/03/2023     Lab Results   Component Value Date     PROTEINUA Negative 01/03/2023    LEUKOCYTESUR 75 (A) 01/03/2023     Lab Results   Component Value Date    HGBA1C 5.8 01/03/2023           Assessment and Plan      Plan:       Problem List Items Addressed This Visit          Ophtho    Hordeolum externum of right upper eyelid    Current Assessment & Plan     RX erythromycin ointment BID x 7 days  Warm compress          Relevant Medications    bacitracin-polymyxin b (POLYSPORIN) ophthalmic ointment       ENT    Neck mass    Current Assessment & Plan     US Soft Tissue Neck          Relevant Orders    US Soft Tissue Head Neck Thyroid       Oncology    Follicular large cell non-Hodgkin's lymphoma    Relevant Orders    CBC Auto Differential    Comprehensive Metabolic Panel    Urinalysis, Reflex to Urine Culture    Vitamin D    Hemoglobin A1C       Endocrine    Prediabetes - Primary    Current Assessment & Plan     Follow ADA diet.  Avoid soda, simple sweets, and limit rice/pasta/bread/starches and consume brown options when possible.   Maintain healthy weight with BMI goal <30.   Perform aerobic exercise for 150 minutes per week (or 5 days a week for 30 minutes each day).              Relevant Orders    Comprehensive Metabolic Panel    Hemoglobin A1C    Vitamin D deficiency    Current Assessment & Plan     Educated on increasing foods high in Vitamin D such as fish oil, cod liver oil, salmon, milk fortified with vitamin D. Vitamin D 2000 I.U. tablets daily (purchase over the counter).  Repeat Vitamin D level as ordered.           Relevant Orders    Vitamin D       GI    Polyp of colon    Current Assessment & Plan     Referral to GI Lab         Relevant Orders    Ambulatory referral/consult to Endo Procedure      Other Visit Diagnoses       Wellness examination        Relevant Orders    CBC Auto Differential    Comprehensive Metabolic Panel    Urinalysis, Reflex to Urine Culture    Hemoglobin A1C              Future Appointments   Date Time Provider Department  Center   2/13/2023  7:00 AM [facility] [address] [facility] [place] [facility]   2/13/2023  8:00 AM [facility] CT1 [address] LIMIT [facility]SCN [place]   2/13/2023  9:30 AM [facility] MAMMO2 [facility] MAMMO [place] [facility]   2/14/2023  9:10 AM KARLY Joy [facility] GYN [place]   2/20/2023 10:20 AM NURSE, [facility] HEMATOLOGY ONCOLOGY [facility] HEMOMC [place] [facility]   2/20/2023 11:20 AM Mauricio Mace [facility]            Signature:     [facility]  [address] [facility] [address]    Date of encounter: 1/5/23

## 2023-01-09 ENCOUNTER — TELEPHONE (OUTPATIENT)
Dept: INTERNAL MEDICINE | Facility: CLINIC | Age: 68
End: 2023-01-09
Payer: MEDICARE

## 2023-01-09 DIAGNOSIS — H00.011 HORDEOLUM EXTERNUM OF RIGHT UPPER EYELID: ICD-10-CM

## 2023-01-09 DIAGNOSIS — R30.0 DYSURIA: Primary | ICD-10-CM

## 2023-01-09 RX ORDER — NITROFURANTOIN 25; 75 MG/1; MG/1
100 CAPSULE ORAL 2 TIMES DAILY
Qty: 14 CAPSULE | Refills: 0 | Status: SHIPPED | OUTPATIENT
Start: 2023-01-09 | End: 2023-01-16

## 2023-01-09 RX ORDER — BACITRACIN ZINC AND POLYMYXIN B SULFATE 500; 10000 [USP'U]/G; [USP'U]/G
OINTMENT OPHTHALMIC 2 TIMES DAILY
Qty: 3.5 G | Refills: 1 | Status: SHIPPED | OUTPATIENT
Start: 2023-01-09 | End: 2023-01-16

## 2023-01-09 NOTE — TELEPHONE ENCOUNTER
Pt stated that she was offered medication for UTI and she  refused on the day of her appt 01/05/2023. Stated that she has changed her mind. Stated she really needs mediation. Also stated that pharmacy never received script for polysporin. Did inform pt that script was sent over on 01/05/23    Please advise

## 2023-01-17 ENCOUNTER — PATIENT MESSAGE (OUTPATIENT)
Dept: INTERNAL MEDICINE | Facility: CLINIC | Age: 68
End: 2023-01-17
Payer: MEDICARE

## 2023-01-17 ENCOUNTER — HOSPITAL ENCOUNTER (OUTPATIENT)
Dept: RADIOLOGY | Facility: HOSPITAL | Age: 68
Discharge: HOME | End: 2023-01-17
Attending: NURSE PRACTITIONER
Payer: MEDICARE

## 2023-01-17 DIAGNOSIS — R22.1 NECK MASS: ICD-10-CM

## 2023-01-17 PROCEDURE — 76536 US EXAM OF HEAD AND NECK: CPT | Mod: TC

## 2023-01-18 ENCOUNTER — TELEPHONE (OUTPATIENT)
Dept: INTERNAL MEDICINE | Facility: CLINIC | Age: 68
End: 2023-01-18
Payer: MEDICARE

## 2023-01-18 DIAGNOSIS — R59.0 LYMPHADENOPATHY OF HEAD AND NECK REGION: ICD-10-CM

## 2023-01-18 DIAGNOSIS — R59.0 LOCALIZED ENLARGED LYMPH NODES: Primary | ICD-10-CM

## 2023-01-18 NOTE — TELEPHONE ENCOUNTER
Please inform the pt that he US reports shows 2 nodules that seem to represent enlarged lymph nodes that need further evaluation with a CT of her neck as I am sure she read. I have ordered the CT, they will contact her to set up an appt.    Thanks.

## 2023-01-27 ENCOUNTER — HOSPITAL ENCOUNTER (OUTPATIENT)
Dept: RADIOLOGY | Facility: HOSPITAL | Age: 68
Discharge: HOME | End: 2023-01-27
Attending: NURSE PRACTITIONER
Payer: MEDICARE

## 2023-01-27 ENCOUNTER — HOSPITAL ENCOUNTER (OUTPATIENT)
Dept: RADIOLOGY | Facility: HOSPITAL | Age: 68
Discharge: HOME | End: 2023-01-27
Attending: INTERNAL MEDICINE
Payer: MEDICARE

## 2023-01-27 DIAGNOSIS — C82.00 FOLLICULAR LYMPHOMA GRADE I: ICD-10-CM

## 2023-01-27 DIAGNOSIS — Z85.72 ENCOUNTER FOR FOLLOW-UP SURVEILLANCE OF LYMPHOMA: ICD-10-CM

## 2023-01-27 DIAGNOSIS — Z08 ENCOUNTER FOR FOLLOW-UP SURVEILLANCE OF LYMPHOMA: ICD-10-CM

## 2023-01-27 DIAGNOSIS — R59.0 LOCALIZED ENLARGED LYMPH NODES: ICD-10-CM

## 2023-01-27 DIAGNOSIS — R59.0 LYMPHADENOPATHY OF HEAD AND NECK REGION: ICD-10-CM

## 2023-01-27 PROCEDURE — 25500020 PHARM REV CODE 255: Performed by: INTERNAL MEDICINE

## 2023-01-27 PROCEDURE — 71260 CT THORAX DX C+: CPT | Mod: TC

## 2023-01-27 PROCEDURE — 70492 CT SFT TSUE NCK W/O & W/DYE: CPT | Mod: TC

## 2023-01-27 PROCEDURE — 74177 CT ABD & PELVIS W/CONTRAST: CPT | Mod: TC

## 2023-01-27 RX ADMIN — IOHEXOL 50 ML: 350 INJECTION, SOLUTION INTRAVENOUS at 08:01

## 2023-02-14 ENCOUNTER — HOSPITAL ENCOUNTER (OUTPATIENT)
Dept: RADIOLOGY | Facility: HOSPITAL | Age: 68
Discharge: HOME | End: 2023-02-14
Attending: INTERNAL MEDICINE
Payer: MEDICARE

## 2023-02-14 ENCOUNTER — OFFICE VISIT (OUTPATIENT)
Dept: GYNECOLOGY | Facility: CLINIC | Age: 68
End: 2023-02-14
Payer: MEDICARE

## 2023-02-14 VITALS
RESPIRATION RATE: 16 BRPM | BODY MASS INDEX: 28.54 KG/M2 | HEIGHT: 64 IN | DIASTOLIC BLOOD PRESSURE: 81 MMHG | HEART RATE: 67 BPM | TEMPERATURE: 98 F | WEIGHT: 167.19 LBS | OXYGEN SATURATION: 100 % | SYSTOLIC BLOOD PRESSURE: 138 MMHG

## 2023-02-14 DIAGNOSIS — Z12.31 BREAST CANCER SCREENING BY MAMMOGRAM: ICD-10-CM

## 2023-02-14 DIAGNOSIS — Z01.419 WOMEN'S ANNUAL ROUTINE GYNECOLOGICAL EXAMINATION: Primary | ICD-10-CM

## 2023-02-14 DIAGNOSIS — N64.4 BREAST PAIN: ICD-10-CM

## 2023-02-14 DIAGNOSIS — N39.9 URINARY PROBLEM IN FEMALE: ICD-10-CM

## 2023-02-14 LAB
BILIRUB SERPL-MCNC: NEGATIVE MG/DL
BLOOD URINE, POC: NORMAL
COLOR, POC UA: YELLOW
GLUCOSE UR QL STRIP: NEGATIVE
KETONES UR QL STRIP: NEGATIVE
LEUKOCYTE ESTERASE URINE, POC: NEGATIVE
NITRITE, POC UA: NEGATIVE
PH, POC UA: 8.5
PROTEIN, POC: NEGATIVE
SPECIFIC GRAVITY, POC UA: 1.01
UROBILINOGEN, POC UA: 0.2

## 2023-02-14 PROCEDURE — 1159F MED LIST DOCD IN RCRD: CPT | Mod: CPTII,,, | Performed by: NURSE PRACTITIONER

## 2023-02-14 PROCEDURE — 77063 MAMMO DIGITAL SCREENING BILAT WITH TOMO: ICD-10-PCS | Mod: 26,,, | Performed by: RADIOLOGY

## 2023-02-14 PROCEDURE — 99397 PR PREVENTIVE VISIT,EST,65 & OVER: ICD-10-PCS | Mod: S$PBB,,, | Performed by: NURSE PRACTITIONER

## 2023-02-14 PROCEDURE — 77067 MAMMO DIGITAL SCREENING BILAT WITH TOMO: ICD-10-PCS | Mod: 26,,, | Performed by: RADIOLOGY

## 2023-02-14 PROCEDURE — 1160F PR REVIEW ALL MEDS BY PRESCRIBER/CLIN PHARMACIST DOCUMENTED: ICD-10-PCS | Mod: CPTII,,, | Performed by: NURSE PRACTITIONER

## 2023-02-14 PROCEDURE — 1101F PT FALLS ASSESS-DOCD LE1/YR: CPT | Mod: CPTII,,, | Performed by: NURSE PRACTITIONER

## 2023-02-14 PROCEDURE — 3008F PR BODY MASS INDEX (BMI) DOCUMENTED: ICD-10-PCS | Mod: CPTII,,, | Performed by: NURSE PRACTITIONER

## 2023-02-14 PROCEDURE — 77063 BREAST TOMOSYNTHESIS BI: CPT | Mod: 26,,, | Performed by: RADIOLOGY

## 2023-02-14 PROCEDURE — 77067 SCR MAMMO BI INCL CAD: CPT | Mod: TC

## 2023-02-14 PROCEDURE — 3288F FALL RISK ASSESSMENT DOCD: CPT | Mod: CPTII,,, | Performed by: NURSE PRACTITIONER

## 2023-02-14 PROCEDURE — 1159F PR MEDICATION LIST DOCUMENTED IN MEDICAL RECORD: ICD-10-PCS | Mod: CPTII,,, | Performed by: NURSE PRACTITIONER

## 2023-02-14 PROCEDURE — 3079F DIAST BP 80-89 MM HG: CPT | Mod: CPTII,,, | Performed by: NURSE PRACTITIONER

## 2023-02-14 PROCEDURE — 77067 SCR MAMMO BI INCL CAD: CPT | Mod: 26,,, | Performed by: RADIOLOGY

## 2023-02-14 PROCEDURE — 3075F PR MOST RECENT SYSTOLIC BLOOD PRESS GE 130-139MM HG: ICD-10-PCS | Mod: CPTII,,, | Performed by: NURSE PRACTITIONER

## 2023-02-14 PROCEDURE — 99397 PER PM REEVAL EST PAT 65+ YR: CPT | Mod: S$PBB,,, | Performed by: NURSE PRACTITIONER

## 2023-02-14 PROCEDURE — 3079F PR MOST RECENT DIASTOLIC BLOOD PRESSURE 80-89 MM HG: ICD-10-PCS | Mod: CPTII,,, | Performed by: NURSE PRACTITIONER

## 2023-02-14 PROCEDURE — 1160F RVW MEDS BY RX/DR IN RCRD: CPT | Mod: CPTII,,, | Performed by: NURSE PRACTITIONER

## 2023-02-14 PROCEDURE — 3288F PR FALLS RISK ASSESSMENT DOCUMENTED: ICD-10-PCS | Mod: CPTII,,, | Performed by: NURSE PRACTITIONER

## 2023-02-14 PROCEDURE — 81001 URINALYSIS AUTO W/SCOPE: CPT | Mod: PBBFAC | Performed by: NURSE PRACTITIONER

## 2023-02-14 PROCEDURE — 1101F PR PT FALLS ASSESS DOC 0-1 FALLS W/OUT INJ PAST YR: ICD-10-PCS | Mod: CPTII,,, | Performed by: NURSE PRACTITIONER

## 2023-02-14 PROCEDURE — 3008F BODY MASS INDEX DOCD: CPT | Mod: CPTII,,, | Performed by: NURSE PRACTITIONER

## 2023-02-14 PROCEDURE — 3075F SYST BP GE 130 - 139MM HG: CPT | Mod: CPTII,,, | Performed by: NURSE PRACTITIONER

## 2023-02-14 PROCEDURE — 99214 OFFICE O/P EST MOD 30 MIN: CPT | Mod: PBBFAC | Performed by: NURSE PRACTITIONER

## 2023-02-14 NOTE — PROGRESS NOTES
"Patient ID: Jeane Cohen is a 68 y.o. female.    Chief Complaint: Well Woman      Review of patient's allergies indicates:  No Known Allergies        HPI:  Pt is  (SABx1) here for annual gyn exam. Denies hx of abnormal pap. Reports hx of hysterectomy with BSO secondary to symptomatic uterine fibroids. Denies vaginal bleeding, itching, or discharge. Today, pt is requesting urinalysis as she states she was recently treated for UTI approx 2 weeks ago and wants to ensure it has resolved .She denies dysuria/hematuria/frequency. Pt had cystoscopy 2022 secondary to [facility] that was negative.Pt also c/o left breast pain intermittent x 6 months. States pain is only with touch. Denies palpable mass. Denies nipple discharge. Pt also with hx of non-hodgkin's lymphoma diagnosed in . Completed chemotherapy 2017. Currently in remission. Pt is . Denies dyspareunia or postcoital spotting.  Followed in medicine clinic for primary care.    Review of Systems:   Negative except for findings in HPI     Objective:   /81   Pulse 67   Temp 97.8 °F (36.6 °C)   Resp 16   Ht 5' 4" (1.626 m)   Wt 75.8 kg (167 lb 3.2 oz)   SpO2 100%   BMI 28.70 kg/m²    Physical Exam:  GENERAL: Pt is aware and alert and  in no acute distress.  BREASTS: Bilateral-No masses, nipple discharge, skin changes, or tenderness.  ABDOMEN: Soft, non tender.  VULVA:  No lesions or skin changes.  URETHRA: No lesions  BLADDER: No tenderness.  VAGINA: Mucosa pale,no discharge or lesions.  CERVIX:  absent  BIMANUAL EXAM:. The uterus is absent. Dennis adnexa reveal no evidence of masses; no fullness   SKIN: Warm and Dry  PSYCHIATRIC: Patient is awake and alert. Mood and affect are normal.    Assessment:   Women's annual routine gynecological examination    Breast pain  -     Mammo Digital Diagnostic Bilat with Scar; Future; Expected date: 2023    Urinary problem in female  -     POCT urinalysis, dipstick or tablet reag            1. Women's annual " routine gynecological examination    2. Breast pain    3. Urinary problem in female               -pelvic; pap deferred secondary to hyst for benign conditions per ACOG guidelines  -DEXA UTD-ordered by pcp; pt takes ca/vit d daily  -ua in clinic with no leukocytes/nitrites/[place] f[place] [facility]  Plan:       Follow up in about 1 year (around 2/14/2024).

## 2023-02-17 DIAGNOSIS — C82.80 FOLLICULAR LARGE CELL NON-HODGKIN'S LYMPHOMA: Primary | ICD-10-CM

## 2023-02-19 PROBLEM — Z85.72 HISTORY OF FOLLICULAR LYMPHOMA: Status: ACTIVE | Noted: 2023-02-19

## 2023-02-19 PROBLEM — R59.0 CERVICAL LYMPHADENOPATHY: Status: ACTIVE | Noted: 2023-02-19

## 2023-02-20 ENCOUNTER — OFFICE VISIT (OUTPATIENT)
Dept: HEMATOLOGY/ONCOLOGY | Facility: CLINIC | Age: 68
End: 2023-02-20
Payer: MEDICARE

## 2023-02-20 VITALS
HEIGHT: 64 IN | TEMPERATURE: 98 F | RESPIRATION RATE: 20 BRPM | HEART RATE: 72 BPM | WEIGHT: 167 LBS | SYSTOLIC BLOOD PRESSURE: 120 MMHG | DIASTOLIC BLOOD PRESSURE: 76 MMHG | BODY MASS INDEX: 28.51 KG/M2 | OXYGEN SATURATION: 100 %

## 2023-02-20 DIAGNOSIS — C82.80 FOLLICULAR LARGE CELL NON-HODGKIN'S LYMPHOMA: Primary | ICD-10-CM

## 2023-02-20 DIAGNOSIS — Z12.31 OTHER SCREENING MAMMOGRAM: ICD-10-CM

## 2023-02-20 DIAGNOSIS — E55.9 VITAMIN D DEFICIENCY: ICD-10-CM

## 2023-02-20 DIAGNOSIS — Z12.31 OTHER SCREENING MAMMOGRAM: Primary | ICD-10-CM

## 2023-02-20 DIAGNOSIS — R31.29 MICROSCOPIC HEMATURIA: ICD-10-CM

## 2023-02-20 DIAGNOSIS — C82.80 FOLLICULAR LARGE CELL NON-HODGKIN'S LYMPHOMA: ICD-10-CM

## 2023-02-20 DIAGNOSIS — Z85.72 HISTORY OF FOLLICULAR LYMPHOMA: ICD-10-CM

## 2023-02-20 DIAGNOSIS — R59.0 CERVICAL LYMPHADENOPATHY: ICD-10-CM

## 2023-02-20 LAB
HBV CORE AB SERPL QL IA: ABNORMAL
HBV SURFACE AG SERPL QL IA: NONREACTIVE
HCV AB SERPL QL IA: NONREACTIVE
IGA SERPL-MCNC: 347 MG/DL (ref 69–517)
IGG SERPL-MCNC: 1187 MG/DL (ref 522–1631)
IGM SERPL-MCNC: 47 MG/DL (ref 33–293)
LDH SERPL-CCNC: 194 U/L (ref 125–220)
URATE SERPL-MCNC: 4.5 MG/DL (ref 2.6–6)

## 2023-02-20 PROCEDURE — 99214 OFFICE O/P EST MOD 30 MIN: CPT | Mod: PBBFAC | Performed by: INTERNAL MEDICINE

## 2023-02-20 PROCEDURE — 1160F RVW MEDS BY RX/DR IN RCRD: CPT | Mod: CPTII,,, | Performed by: INTERNAL MEDICINE

## 2023-02-20 PROCEDURE — 99214 OFFICE O/P EST MOD 30 MIN: CPT | Mod: S$PBB,,, | Performed by: INTERNAL MEDICINE

## 2023-02-20 PROCEDURE — 1126F PR PAIN SEVERITY QUANTIFIED, NO PAIN PRESENT: ICD-10-PCS | Mod: CPTII,,, | Performed by: INTERNAL MEDICINE

## 2023-02-20 PROCEDURE — 3288F FALL RISK ASSESSMENT DOCD: CPT | Mod: CPTII,,, | Performed by: INTERNAL MEDICINE

## 2023-02-20 PROCEDURE — 99214 PR OFFICE/OUTPT VISIT, EST, LEVL IV, 30-39 MIN: ICD-10-PCS | Mod: S$PBB,,, | Performed by: INTERNAL MEDICINE

## 2023-02-20 PROCEDURE — 3288F PR FALLS RISK ASSESSMENT DOCUMENTED: ICD-10-PCS | Mod: CPTII,,, | Performed by: INTERNAL MEDICINE

## 2023-02-20 PROCEDURE — 3008F BODY MASS INDEX DOCD: CPT | Mod: CPTII,,, | Performed by: INTERNAL MEDICINE

## 2023-02-20 PROCEDURE — 3008F PR BODY MASS INDEX (BMI) DOCUMENTED: ICD-10-PCS | Mod: CPTII,,, | Performed by: INTERNAL MEDICINE

## 2023-02-20 PROCEDURE — 84550 ASSAY OF BLOOD/URIC ACID: CPT | Performed by: INTERNAL MEDICINE

## 2023-02-20 PROCEDURE — 86334 IMMUNOFIX E-PHORESIS SERUM: CPT | Performed by: INTERNAL MEDICINE

## 2023-02-20 PROCEDURE — 3074F SYST BP LT 130 MM HG: CPT | Mod: CPTII,,, | Performed by: INTERNAL MEDICINE

## 2023-02-20 PROCEDURE — 86803 HEPATITIS C AB TEST: CPT | Performed by: INTERNAL MEDICINE

## 2023-02-20 PROCEDURE — 1101F PR PT FALLS ASSESS DOC 0-1 FALLS W/OUT INJ PAST YR: ICD-10-PCS | Mod: CPTII,,, | Performed by: INTERNAL MEDICINE

## 2023-02-20 PROCEDURE — 86704 HEP B CORE ANTIBODY TOTAL: CPT | Performed by: INTERNAL MEDICINE

## 2023-02-20 PROCEDURE — 82784 ASSAY IGA/IGD/IGG/IGM EACH: CPT | Performed by: INTERNAL MEDICINE

## 2023-02-20 PROCEDURE — 3074F PR MOST RECENT SYSTOLIC BLOOD PRESSURE < 130 MM HG: ICD-10-PCS | Mod: CPTII,,, | Performed by: INTERNAL MEDICINE

## 2023-02-20 PROCEDURE — 1159F PR MEDICATION LIST DOCUMENTED IN MEDICAL RECORD: ICD-10-PCS | Mod: CPTII,,, | Performed by: INTERNAL MEDICINE

## 2023-02-20 PROCEDURE — 1101F PT FALLS ASSESS-DOCD LE1/YR: CPT | Mod: CPTII,,, | Performed by: INTERNAL MEDICINE

## 2023-02-20 PROCEDURE — 1159F MED LIST DOCD IN RCRD: CPT | Mod: CPTII,,, | Performed by: INTERNAL MEDICINE

## 2023-02-20 PROCEDURE — 84165 PROTEIN E-PHORESIS SERUM: CPT | Performed by: INTERNAL MEDICINE

## 2023-02-20 PROCEDURE — 83521 IG LIGHT CHAINS FREE EACH: CPT | Performed by: INTERNAL MEDICINE

## 2023-02-20 PROCEDURE — 1126F AMNT PAIN NOTED NONE PRSNT: CPT | Mod: CPTII,,, | Performed by: INTERNAL MEDICINE

## 2023-02-20 PROCEDURE — 36415 COLL VENOUS BLD VENIPUNCTURE: CPT | Performed by: INTERNAL MEDICINE

## 2023-02-20 PROCEDURE — 3078F DIAST BP <80 MM HG: CPT | Mod: CPTII,,, | Performed by: INTERNAL MEDICINE

## 2023-02-20 PROCEDURE — 3078F PR MOST RECENT DIASTOLIC BLOOD PRESSURE < 80 MM HG: ICD-10-PCS | Mod: CPTII,,, | Performed by: INTERNAL MEDICINE

## 2023-02-20 PROCEDURE — 87340 HEPATITIS B SURFACE AG IA: CPT | Performed by: INTERNAL MEDICINE

## 2023-02-20 PROCEDURE — 83615 LACTATE (LD) (LDH) ENZYME: CPT | Performed by: INTERNAL MEDICINE

## 2023-02-20 PROCEDURE — 1160F PR REVIEW ALL MEDS BY PRESCRIBER/CLIN PHARMACIST DOCUMENTED: ICD-10-PCS | Mod: CPTII,,, | Performed by: INTERNAL MEDICINE

## 2023-02-20 PROCEDURE — 82232 ASSAY OF BETA-2 PROTEIN: CPT | Performed by: INTERNAL MEDICINE

## 2023-02-20 NOTE — Clinical Note
Orders for today:  Check LDH and uric acid level  Refer to surgery for excisional biopsy of cervical lymph node ASAP Check hepatitis-B surface antigen, hepatitis-B core antibody IgG, beta 2 microglobulin, SPEP, JASMIN, hepatitis-C antibody  Need PET-CT ASAP  Repeat mammogram in February 2024   Follow-up in 2 weeks, with results of requested studies.
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
## 2023-02-21 LAB
B2 MICROGLOB SERPL-MCNC: 1.85 MCG/ML (ref 1.21–2.7)
KAPPA LC FREE SER-MCNC: 1.76 MG/DL (ref 0.33–1.94)
KAPPA LC FREE/LAMBDA FREE SER: 0.98 {RATIO} (ref 0.26–1.65)
LAMBDA LC FREE SERPL-MCNC: 1.8 MG/DL (ref 0.57–2.63)

## 2023-02-22 LAB
ALBUMIN % SPEP (OHS): 49.98
ALBUMIN SERPL-MCNC: 3.3 G/DL (ref 3.4–4.8)
ALBUMIN/GLOB SERPL: 1 RATIO (ref 1.1–2)
ALPHA 1 GLOB (OHS): 0.24 GM/DL
ALPHA 1 GLOB% (OHS): 3.65
ALPHA 2 GLOB % (OHS): 12.42
ALPHA 2 GLOB (OHS): 0.82 GM/DL
BETA GLOB (OHS): 1.06 GM/DL
BETA GLOB% (OHS): 16.05
GAMMA GLOBULIN % (OHS): 17.9
GAMMA GLOBULIN (OHS): 1.18 GM/DL
GLOBULIN SER-MCNC: 3.3 GM/DL (ref 2.4–3.5)
M SPIKE % (OHS): ABNORMAL
M SPIKE (OHS): ABNORMAL
PATH REV: NORMAL
PROT SERPL-MCNC: 6.6 GM/DL (ref 5.8–7.6)

## 2023-02-28 DIAGNOSIS — C82.80 FOLLICULAR LARGE CELL NON-HODGKIN'S LYMPHOMA: Primary | ICD-10-CM

## 2023-03-09 ENCOUNTER — HOSPITAL ENCOUNTER (OUTPATIENT)
Dept: RADIOLOGY | Facility: HOSPITAL | Age: 68
Discharge: HOME | End: 2023-03-09
Attending: INTERNAL MEDICINE
Payer: MEDICARE

## 2023-03-09 DIAGNOSIS — E55.9 VITAMIN D DEFICIENCY: ICD-10-CM

## 2023-03-09 DIAGNOSIS — R31.29 MICROSCOPIC HEMATURIA: ICD-10-CM

## 2023-03-09 DIAGNOSIS — C82.80 FOLLICULAR LARGE CELL NON-HODGKIN'S LYMPHOMA: ICD-10-CM

## 2023-03-09 PROCEDURE — A9552 F18 FDG: HCPCS

## 2023-03-09 PROCEDURE — 78815 PET IMAGE W/CT SKULL-THIGH: CPT | Mod: TC,PS

## 2023-03-10 ENCOUNTER — DOCUMENTATION ONLY (OUTPATIENT)
Dept: HEMATOLOGY/ONCOLOGY | Facility: CLINIC | Age: 68
End: 2023-03-10
Payer: MEDICARE

## 2023-03-10 NOTE — PROGRESS NOTES
PET-CT noted.      Refer to surgery/ENT for excisional biopsy of cervical lymph nodes which are PET pos, ASAP, to rule out lymphoma recurrence/progression!    In the past, we have had push back in respect of excisional biopsy of cervical lymph nodes.    If we encounter the same problem this time, then, inform me immediately, and [facility].

## 2023-03-10 NOTE — PROGRESS NOTES
PET-CT noted.      Refer to surgery/ENT for excisional biopsy of cervical lymph nodes which are PET pos, ASAP, to rule out lymphoma recurrence/progression!    In the past, we have had push back in respect of excisional biopsy of cervical lymph nodes.    If we encounter the same problem this time, then, inform me immediately, and [facility].

## 2023-03-13 ENCOUNTER — TELEPHONE (OUTPATIENT)
Dept: HEMATOLOGY/ONCOLOGY | Facility: CLINIC | Age: 68
End: 2023-03-13
Payer: MEDICARE

## 2023-03-13 PROBLEM — R59.0 PELVIC LYMPHADENOPATHY: Status: ACTIVE | Noted: 2023-03-13

## 2023-03-13 PROBLEM — R94.8 ABNORMAL POSITRON EMISSION TOMOGRAPHY (PET) SCAN: Status: ACTIVE | Noted: 2023-03-13

## 2023-03-14 ENCOUNTER — OFFICE VISIT (OUTPATIENT)
Dept: HEMATOLOGY/ONCOLOGY | Facility: CLINIC | Age: 68
End: 2023-03-14
Attending: INTERNAL MEDICINE
Payer: MEDICARE

## 2023-03-14 ENCOUNTER — OFFICE VISIT (OUTPATIENT)
Dept: OTOLARYNGOLOGY | Facility: CLINIC | Age: 68
End: 2023-03-14
Attending: INTERNAL MEDICINE
Payer: MEDICARE

## 2023-03-14 VITALS
WEIGHT: 163 LBS | BODY MASS INDEX: 28.88 KG/M2 | DIASTOLIC BLOOD PRESSURE: 89 MMHG | HEIGHT: 63 IN | SYSTOLIC BLOOD PRESSURE: 129 MMHG | RESPIRATION RATE: 16 BRPM | HEART RATE: 70 BPM | TEMPERATURE: 98 F

## 2023-03-14 VITALS
SYSTOLIC BLOOD PRESSURE: 139 MMHG | HEIGHT: 64 IN | OXYGEN SATURATION: 99 % | HEART RATE: 72 BPM | WEIGHT: 163 LBS | TEMPERATURE: 98 F | RESPIRATION RATE: 20 BRPM | BODY MASS INDEX: 27.83 KG/M2 | DIASTOLIC BLOOD PRESSURE: 89 MMHG

## 2023-03-14 DIAGNOSIS — R59.0 CERVICAL LYMPHADENOPATHY: ICD-10-CM

## 2023-03-14 DIAGNOSIS — R59.0 PELVIC LYMPHADENOPATHY: ICD-10-CM

## 2023-03-14 DIAGNOSIS — C82.80 FOLLICULAR LARGE CELL NON-HODGKIN'S LYMPHOMA: ICD-10-CM

## 2023-03-14 DIAGNOSIS — R22.1 NECK MASS: Primary | ICD-10-CM

## 2023-03-14 DIAGNOSIS — R94.8 ABNORMAL POSITRON EMISSION TOMOGRAPHY (PET) SCAN: ICD-10-CM

## 2023-03-14 DIAGNOSIS — C82.80 FOLLICULAR LARGE CELL NON-HODGKIN'S LYMPHOMA: Primary | ICD-10-CM

## 2023-03-14 PROCEDURE — 99215 OFFICE O/P EST HI 40 MIN: CPT | Mod: PBBFAC,27 | Performed by: OTOLARYNGOLOGY

## 2023-03-14 PROCEDURE — 1101F PR PT FALLS ASSESS DOC 0-1 FALLS W/OUT INJ PAST YR: ICD-10-PCS | Mod: CPTII,,, | Performed by: INTERNAL MEDICINE

## 2023-03-14 PROCEDURE — 3075F PR MOST RECENT SYSTOLIC BLOOD PRESS GE 130-139MM HG: ICD-10-PCS | Mod: CPTII,,, | Performed by: INTERNAL MEDICINE

## 2023-03-14 PROCEDURE — 1126F AMNT PAIN NOTED NONE PRSNT: CPT | Mod: CPTII,,, | Performed by: INTERNAL MEDICINE

## 2023-03-14 PROCEDURE — 3288F PR FALLS RISK ASSESSMENT DOCUMENTED: ICD-10-PCS | Mod: CPTII,,, | Performed by: INTERNAL MEDICINE

## 2023-03-14 PROCEDURE — 99213 OFFICE O/P EST LOW 20 MIN: CPT | Mod: S$PBB,,, | Performed by: INTERNAL MEDICINE

## 2023-03-14 PROCEDURE — 3079F PR MOST RECENT DIASTOLIC BLOOD PRESSURE 80-89 MM HG: ICD-10-PCS | Mod: CPTII,,, | Performed by: INTERNAL MEDICINE

## 2023-03-14 PROCEDURE — 3075F SYST BP GE 130 - 139MM HG: CPT | Mod: CPTII,,, | Performed by: INTERNAL MEDICINE

## 2023-03-14 PROCEDURE — 1160F PR REVIEW ALL MEDS BY PRESCRIBER/CLIN PHARMACIST DOCUMENTED: ICD-10-PCS | Mod: CPTII,,, | Performed by: INTERNAL MEDICINE

## 2023-03-14 PROCEDURE — 1160F RVW MEDS BY RX/DR IN RCRD: CPT | Mod: CPTII,,, | Performed by: INTERNAL MEDICINE

## 2023-03-14 PROCEDURE — 3288F FALL RISK ASSESSMENT DOCD: CPT | Mod: CPTII,,, | Performed by: INTERNAL MEDICINE

## 2023-03-14 PROCEDURE — 99214 OFFICE O/P EST MOD 30 MIN: CPT | Mod: PBBFAC | Performed by: INTERNAL MEDICINE

## 2023-03-14 PROCEDURE — 1159F MED LIST DOCD IN RCRD: CPT | Mod: CPTII,,, | Performed by: INTERNAL MEDICINE

## 2023-03-14 PROCEDURE — 1159F PR MEDICATION LIST DOCUMENTED IN MEDICAL RECORD: ICD-10-PCS | Mod: CPTII,,, | Performed by: INTERNAL MEDICINE

## 2023-03-14 PROCEDURE — 3079F DIAST BP 80-89 MM HG: CPT | Mod: CPTII,,, | Performed by: INTERNAL MEDICINE

## 2023-03-14 PROCEDURE — 3008F BODY MASS INDEX DOCD: CPT | Mod: CPTII,,, | Performed by: INTERNAL MEDICINE

## 2023-03-14 PROCEDURE — 3008F PR BODY MASS INDEX (BMI) DOCUMENTED: ICD-10-PCS | Mod: CPTII,,, | Performed by: INTERNAL MEDICINE

## 2023-03-14 PROCEDURE — 99213 PR OFFICE/OUTPT VISIT, EST, LEVL III, 20-29 MIN: ICD-10-PCS | Mod: S$PBB,,, | Performed by: INTERNAL MEDICINE

## 2023-03-14 PROCEDURE — 1126F PR PAIN SEVERITY QUANTIFIED, NO PAIN PRESENT: ICD-10-PCS | Mod: CPTII,,, | Performed by: INTERNAL MEDICINE

## 2023-03-14 PROCEDURE — 1101F PT FALLS ASSESS-DOCD LE1/YR: CPT | Mod: CPTII,,, | Performed by: INTERNAL MEDICINE

## 2023-03-14 RX ORDER — LIDOCAINE HYDROCHLORIDE 10 MG/ML
1 INJECTION, SOLUTION EPIDURAL; INFILTRATION; INTRACAUDAL; PERINEURAL ONCE
Status: CANCELLED | OUTPATIENT
Start: 2023-03-14 | End: 2023-03-14

## 2023-03-14 RX ORDER — ASPIRIN 81 MG/1
81 TABLET ORAL ONCE
COMMUNITY

## 2023-03-14 RX ORDER — SODIUM CHLORIDE 0.9 % (FLUSH) 0.9 %
10 SYRINGE (ML) INJECTION
Status: DISCONTINUED | OUTPATIENT
Start: 2023-03-14 | End: 2024-01-22
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
## 2023-03-14 NOTE — PROGRESS NOTES
The scope used for the exam was:  Flexible scope ENF-P4  Serial Number:  1)    [id number]    []   2)    [id number]    []   3)    [id number]    []   4)    [id number]    [x]   5)    [id number]    []   6)    [id number]    []       The scope used for the exam was:  Rigid scope   Serial Number:  1)   [id number]    []   2)   [id number]    []   3)   7330    []   4)    3384   []   5)    0824   []   6)    5554   []     7)   7425    []   8)   2240    []   9)   1109    []

## 2023-03-14 NOTE — Clinical Note
Orders for today:  Check on the status of referral to surgery/ENT for excisional biopsy of PET pos lymph nodes, ordered 03/10/2023  Screening mammogram in February 2024   Follow-up in 3 weeks, after lymph node biopsy (please do not schedule follow-up appointment without lymph node biopsy.

## 2023-03-14 NOTE — PROGRESS NOTES
Patient Name: Jeane Cohen   YOB: 1955     Chief Complaint:  Cervical lymphadenopathy       History of Present Illness:  March 14, 2023:   68-year-old female with a history of follicular non-Hodgkin's lymphoma, grade 1, stage IV which was initially diagnosed in January of 2017, which was treated with bendamustine/Rituxan x6 (02/13/2017-08/21/2017) referred for evaluation of cervical lymphadenopathy.  The patient had been without evidence of recurrent disease on surveillance CT scans done up until February 24, 2022.  On the CT scan done on February 24, 2022 patient was noted to have some submental lymph nodes which were felt to be nonspecific which showed a slight increase in size.  On follow-up CT scan done on January 27, 2023, was noted that there was some interval enlargement of these lymph nodes as well as a another lymph node in the left posterior cervical triangle.  PET-CT scan done on March 9, 2023 did show presence of hypermetabolic cervical lymphadenopathy and also left pelvic lymphadenopathy.  PET-CT scan showed the presence of FDG avid lymphadenopathy in the right submental region where there appeared to be a conglomeration of at least 3 lymph nodes and also an FDG avid cervical lymph node in posterior cervical triangle.  CT scans and PET scan were reviewed today.  Patient has indicated that she has had a subjective fever for the past couple of months.  She has approximately 13 lb weight loss since October of 2022 going from 176 lb to 163 lb.  She has been experiencing night sweats on an average of once monthly.  Recently she is had nonproductive cough associated with throat clearing and hoarseness.  No purulent sputum or hemoptysis.  She is also been experiencing some pressure in her ears.    Past Medical History:  Past Medical History:   Diagnosis Date    Chronic hoarseness     Coronary arteriosclerosis     Large cell, follicular non-Hodgkin's lymphoma     Microscopic hematuria      "Prediabetes     Vitamin D deficiency      Past Surgical History:   Procedure Laterality Date    catheter insertion mediport  01/25/2017    COLONOSCOPY  09/01/2017    HYSTERECTOMY  2002    Laparoscopy exploratory  01/25/2017    removal of mediport  06/10/2019    TUBAL LIGATION         Review of Systems:  Unremarkable except as mentioned above.    Current Medications:  Current Outpatient Medications   Medication Sig    aspirin (ECOTRIN) 81 MG EC tablet Take 81 mg by mouth once. "Every other day"    b complex vitamins tablet Take 1 tablet by mouth once daily.    calcium carbonate 1250 MG capsule Take 1,250 mg by mouth 2 (two) times daily with meals.    ibuprofen (ADVIL,MOTRIN) 600 MG tablet Take 600 mg by mouth every 6 (six) hours as needed.    magnesium 30 mg Tab Take by mouth once.    multivitamin capsule Take 1 capsule by mouth once daily.    vitamin D (VITAMIN D3) 1000 units Tab Take 1,000 Units by mouth once daily.    zinc gluconate 50 mg tablet Take 50 mg by mouth once daily.    baclofen (LIORESAL) 10 MG tablet     baclofen (LIORESAL) 20 MG tablet Take 20 mg by mouth every 8 (eight) hours as needed.    meloxicam (MOBIC) 7.5 MG tablet     meloxicam (MOBIC) 7.5 MG tablet Take 7.5 mg by mouth.     Current Facility-Administered Medications   Medication    sodium chloride 0.9% flush 10 mL        Allergies:  Review of patient's allergies indicates:  No Known Allergies     Physical Exam:  Vital signs:   Vitals:    03/14/23 1020   BP: 129/89   BP Location: Left arm   Patient Position: Sitting   BP Method: Medium (Automatic)   Pulse: 70   Resp: 16   Temp: 98.1 °F (36.7 °C)   TempSrc: Oral   Weight: 73.9 kg (163 lb)   Height: 5' 3" (1.6 m)   General:  Well-developed well-nourished female in no acute distress.  Voice is mildly hoarse.  Head and face:  Normocephalic.  No facial lesions.  No temporomandibular joint tenderness or click.  Ears:  Right ear-auricle is normally developed.  External auditory canal is clear.  " Tympanic membrane is nonerythematous.  No middle ear effusion.  Left ear-auricle is normally developed.  External auditory canal is clear.  Tympanic membrane is nonerythematous.  No middle ear effusion.  Nose:  Nasal dorsum is unremarkable.  No significant septal deviation.  No significant intranasal congestion.  Secretions are clear.  Oral cavity and oropharynx:  Tongue and floor mouth are without lesions.  Mucosa is moist.  No pharyngeal erythema or exudates.  No oropharyngeal masses.  No tonsillar hypertrophy.  Neck:  There is an approximately 2-2-1/2 cm palpable mobile mass in the right upper neck just to the right of midline at the level of hyoid bone corresponds to the FDG avid lesion noted on the PET scan.  Other FDG avid lymph node noted in the right neck in level 5 was not palpable.  Was no other palpable adenopathy.  No thyromegaly.  Trachea is in the midline.  Parotid and submandibular glands are normal to palpation.    Cardiovascular: Regular rate and rhythm without murmurs.    Chest:  Clear to auscultation.    Eyes:  Extraocular muscles intact.  No nystagmus.  No exophthalmos or enophthalmos.  Neurologic:  Alert and oriented.  Cranial nerves 2-12 are grossly normal.    Procedure note: Flexible laryngoscopy.  The nose was prepped with 1% phenyleprine nasal spray and tetracaine topically. The flexible fiberoptic laryngoscope was introduced into the right nostril and advanced. Examination of the nose showed the above mentioned findings. Examination of the nasopharynx showed no nasopharyngeal masses or eustachian tube obstruction. Examination of the base of tongue showed mild lingual tonsil hypertrophy.  No other abnormalities of the base of tongue.. Laryngeal examination showed normal vocal cord mobility bilaterally.  No laryngeal masses or lesions.  No erythema. Examination of the hypopharynx showed no hypopharyngeal masses or pooling of secretions in the piriform sinuses.        Assessment/Plan:  68-year-old female with a history of follicular non-Hodgkin's lymphoma, grade 1, stage IV with FDG avid cervical lymphadenopathy which may represent recurrence.    Plan:  Patient is scheduled for excisional biopsy of right submandibular lymphadenopathy evaluation on March 17, 2023.  Risks of the procedure discussed with the patient and her  and son.  Questions were answered.      Jaron Son M.D.

## 2023-03-15 ENCOUNTER — ANESTHESIA EVENT (OUTPATIENT)
Dept: SURGERY | Facility: HOSPITAL | Age: 68
End: 2023-03-15
Payer: MEDICARE

## 2023-03-15 NOTE — ANESTHESIA PREPROCEDURE EVALUATION
03/15/2023  Jeane Cohen is a 68 y.o., female with PMHx of CAD, non Hodgkins lymphoma presents for excision of neck mass.    NO BETA BLOCKER USE    Active Ambulatory Problems     Diagnosis Date Noted    Arteriosclerosis of coronary artery 08/01/2022    Follicular large cell non-Hodgkin's lymphoma 08/01/2022    Microscopic hematuria 08/01/2022    Prediabetes 08/01/2022    Vitamin D deficiency 08/01/2022    Cough 08/01/2022    Upper respiratory tract infection 08/01/2022    Polyp of colon 01/05/2023    Hordeolum externum of right upper eyelid 01/05/2023    Neck mass 01/05/2023    History of follicular lymphoma 02/19/2023    Cervical lymphadenopathy 02/19/2023    Pelvic lymphadenopathy 03/13/2023    Abnormal positron emission tomography (PET) scan 03/13/2023     Resolved Ambulatory Problems     Diagnosis Date Noted    No Resolved Ambulatory Problems     Past Medical History:   Diagnosis Date    Chronic hoarseness     Coronary arteriosclerosis     Large cell, follicular non-Hodgkin's lymphoma        Pre-op Assessment    I have reviewed the NPO Status.      Review of Systems  Anesthesia Hx:  No problems with previous Anesthesia    Social:  Non-Smoker    Hematology/Oncology:        Current/Recent Cancer. chemotherapy   Cardiovascular:   CAD asymptomatic     Pulmonary:  Pulmonary Normal    Renal/:  Renal/ Normal     Hepatic/GI:  Hepatic/GI Normal    Neurological:  Neurology Normal    Endocrine:  Endocrine Normal      Vitals:    03/17/23 0507 03/17/23 0611 03/17/23 0620   BP:  (!) 151/83 (!) 140/89   Pulse:   72   Resp:   20   Temp:   36.3 °C (97.3 °F)   TempSrc:   Temporal   SpO2:   100%   Weight: 73.9 kg (162 lb 14.7 oz)           Physical Exam  General: Alert, Cooperative and Well nourished    Airway:  Mallampati: II   Mouth Opening: Normal  TM Distance: Normal  Tongue: Normal  Neck ROM:  Normal ROM    Dental:  Intact    Chest/Lungs:  Clear to auscultation, Normal Respiratory Rate    Heart:  Rate: Normal  Rhythm: Regular Rhythm  Sounds: Normal      Lab Results   Component Value Date    WBC 4.4 (L) 02/20/2023    HGB 12.5 02/20/2023    HCT 40.2 02/20/2023    MCV 84.3 02/20/2023     02/20/2023       CMP  Sodium Level   Date Value Ref Range Status   02/20/2023 144 136 - 145 mmol/L Final     Potassium Level   Date Value Ref Range Status   02/20/2023 4.2 3.5 - 5.1 mmol/L Final     Carbon Dioxide   Date Value Ref Range Status   02/20/2023 31 23 - 31 mmol/L Final     Blood Urea Nitrogen   Date Value Ref Range Status   02/20/2023 10.5 9.8 - 20.1 mg/dL Final     Creatinine   Date Value Ref Range Status   02/20/2023 0.77 0.55 - 1.02 mg/dL Final     Calcium Level Total   Date Value Ref Range Status   02/20/2023 9.5 8.4 - 10.2 mg/dL Final     Albumin Level   Date Value Ref Range Status   02/20/2023 3.3 (L) 3.4 - 4.8 g/dL Final     Bilirubin Total   Date Value Ref Range Status   02/20/2023 0.4 <=1.5 mg/dL Final     Alkaline Phosphatase   Date Value Ref Range Status   02/20/2023 77 40 - 150 unit/L Final     Aspartate Aminotransferase   Date Value Ref Range Status   02/20/2023 24 5 - 34 unit/L Final     Alanine Aminotransferase   Date Value Ref Range Status   02/20/2023 22 0 - 55 unit/L Final     eGFR   Date Value Ref Range Status   02/20/2023 >60 mls/min/1.73/m2 Final           Anesthesia Plan  Type of Anesthesia, risks & benefits discussed:    Anesthesia Type: Gen ETT  Intra-op Monitoring Plan: Standard ASA Monitors  Post Op Pain Control Plan: IV/PO Opioids PRN  Induction:  IV  Airway Plan: Direct  Informed Consent: Informed consent signed with the Patient and all parties understand the risks and agree with anesthesia plan.  All questions answered.   ASA Score: 3  Day of Surgery Review of History & Physical: H&P Update referred to the surgeon/provider.    Ready For Surgery From Anesthesia Perspective.      .

## 2023-03-17 ENCOUNTER — ANESTHESIA (OUTPATIENT)
Dept: SURGERY | Facility: HOSPITAL | Age: 68
End: 2023-03-17
Payer: MEDICARE

## 2023-03-17 ENCOUNTER — HOSPITAL ENCOUNTER (OUTPATIENT)
Facility: HOSPITAL | Age: 68
Discharge: HOME | End: 2023-03-17
Attending: OTOLARYNGOLOGY | Admitting: OTOLARYNGOLOGY
Payer: MEDICARE

## 2023-03-17 DIAGNOSIS — Z01.818 PRE-OP EVALUATION: ICD-10-CM

## 2023-03-17 DIAGNOSIS — R59.0 CERVICAL LYMPHADENOPATHY: Primary | ICD-10-CM

## 2023-03-17 DIAGNOSIS — R22.1 NECK MASS: ICD-10-CM

## 2023-03-17 DIAGNOSIS — Z85.72 HISTORY OF LYMPHOMA: ICD-10-CM

## 2023-03-17 LAB
BASOPHILS # BLD AUTO: 0.04 X10(3)/MCL (ref 0–0.2)
BASOPHILS NFR BLD AUTO: 0.8 %
EOSINOPHIL # BLD AUTO: 0.12 X10(3)/MCL (ref 0–0.9)
EOSINOPHIL NFR BLD AUTO: 2.5 %
ERYTHROCYTE [DISTWIDTH] IN BLOOD BY AUTOMATED COUNT: 14.7 % (ref 11.5–17)
HCT VFR BLD AUTO: 38.5 % (ref 37–47)
HGB BLD-MCNC: 12.4 G/DL (ref 12–16)
IMM GRANULOCYTES # BLD AUTO: 0.01 X10(3)/MCL (ref 0–0.04)
IMM GRANULOCYTES NFR BLD AUTO: 0.2 %
LYMPHOCYTES # BLD AUTO: 2.21 X10(3)/MCL (ref 0.6–4.6)
LYMPHOCYTES NFR BLD AUTO: 46 %
MCH RBC QN AUTO: 26.9 PG
MCHC RBC AUTO-ENTMCNC: 32.2 G/DL (ref 33–36)
MCV RBC AUTO: 83.5 FL (ref 80–94)
MONOCYTES # BLD AUTO: 0.42 X10(3)/MCL (ref 0.1–1.3)
MONOCYTES NFR BLD AUTO: 8.8 %
NEUTROPHILS # BLD AUTO: 2 X10(3)/MCL (ref 2.1–9.2)
NEUTROPHILS NFR BLD AUTO: 41.7 %
NRBC BLD AUTO-RTO: 0 %
PLATELET # BLD AUTO: 208 X10(3)/MCL (ref 130–400)
PMV BLD AUTO: 10.6 FL (ref 7.4–10.4)
RBC # BLD AUTO: 4.61 X10(6)/MCL (ref 4.2–5.4)
WBC # SPEC AUTO: 4.8 X10(3)/MCL (ref 4.5–11.5)

## 2023-03-17 PROCEDURE — 63600175 PHARM REV CODE 636 W HCPCS: Performed by: ANESTHESIOLOGY

## 2023-03-17 PROCEDURE — 88185 FLOWCYTOMETRY/TC ADD-ON: CPT | Performed by: PATHOLOGY

## 2023-03-17 PROCEDURE — 37000008 HC ANESTHESIA 1ST 15 MINUTES: Performed by: OTOLARYNGOLOGY

## 2023-03-17 PROCEDURE — 36000706: Performed by: OTOLARYNGOLOGY

## 2023-03-17 PROCEDURE — 85025 COMPLETE CBC W/AUTO DIFF WBC: CPT | Performed by: STUDENT IN AN ORGANIZED HEALTH CARE EDUCATION/TRAINING PROGRAM

## 2023-03-17 PROCEDURE — 88188 FLOWCYTOMETRY/READ 9-15: CPT | Mod: 90 | Performed by: PATHOLOGY

## 2023-03-17 PROCEDURE — 30000890 HC MISC. SEND OUT TEST: Performed by: PATHOLOGY

## 2023-03-17 PROCEDURE — 88184 FLOWCYTOMETRY/ TC 1 MARKER: CPT | Performed by: PATHOLOGY

## 2023-03-17 PROCEDURE — 63600175 PHARM REV CODE 636 W HCPCS: Performed by: NURSE ANESTHETIST, CERTIFIED REGISTERED

## 2023-03-17 PROCEDURE — 36000707: Performed by: OTOLARYNGOLOGY

## 2023-03-17 PROCEDURE — 37000009 HC ANESTHESIA EA ADD 15 MINS: Performed by: OTOLARYNGOLOGY

## 2023-03-17 PROCEDURE — 71000016 HC POSTOP RECOV ADDL HR: Performed by: OTOLARYNGOLOGY

## 2023-03-17 PROCEDURE — 25000003 PHARM REV CODE 250: Performed by: NURSE ANESTHETIST, CERTIFIED REGISTERED

## 2023-03-17 PROCEDURE — 30000890 GENERAL MISCELLANEOUS TEST (BEAKER): Mod: 90 | Performed by: PATHOLOGY

## 2023-03-17 PROCEDURE — 25000003 PHARM REV CODE 250: Performed by: OTOLARYNGOLOGY

## 2023-03-17 PROCEDURE — 71000015 HC POSTOP RECOV 1ST HR: Performed by: OTOLARYNGOLOGY

## 2023-03-17 PROCEDURE — 71000033 HC RECOVERY, INTIAL HOUR: Performed by: OTOLARYNGOLOGY

## 2023-03-17 PROCEDURE — 88305 TISSUE EXAM BY PATHOLOGIST: CPT | Mod: TC | Performed by: OTOLARYNGOLOGY

## 2023-03-17 PROCEDURE — 93005 ELECTROCARDIOGRAM TRACING: CPT | Mod: 59

## 2023-03-17 RX ORDER — LIDOCAINE HYDROCHLORIDE AND EPINEPHRINE 10; 10 MG/ML; UG/ML
INJECTION, SOLUTION INFILTRATION; PERINEURAL
Status: DISCONTINUED | OUTPATIENT
Start: 2023-03-17 | End: 2023-03-17 | Stop reason: HOSPADM

## 2023-03-17 RX ORDER — NEOSTIGMINE METHYLSULFATE 1 MG/ML
INJECTION, SOLUTION INTRAVENOUS
Status: DISCONTINUED | OUTPATIENT
Start: 2023-03-17 | End: 2023-03-17

## 2023-03-17 RX ORDER — ONDANSETRON 2 MG/ML
INJECTION INTRAMUSCULAR; INTRAVENOUS
Status: DISCONTINUED | OUTPATIENT
Start: 2023-03-17 | End: 2023-03-17

## 2023-03-17 RX ORDER — MEPERIDINE HYDROCHLORIDE 25 MG/ML
12.5 INJECTION INTRAMUSCULAR; INTRAVENOUS; SUBCUTANEOUS EVERY 10 MIN PRN
Status: DISCONTINUED | OUTPATIENT
Start: 2023-03-17 | End: 2023-03-17 | Stop reason: HOSPADM

## 2023-03-17 RX ORDER — MIDAZOLAM HYDROCHLORIDE 1 MG/ML
INJECTION INTRAMUSCULAR; INTRAVENOUS
Status: DISCONTINUED
Start: 2023-03-17 | End: 2023-03-17 | Stop reason: HOSPADM

## 2023-03-17 RX ORDER — DEXAMETHASONE SODIUM PHOSPHATE 4 MG/ML
INJECTION, SOLUTION INTRA-ARTICULAR; INTRALESIONAL; INTRAMUSCULAR; INTRAVENOUS; SOFT TISSUE
Status: DISCONTINUED | OUTPATIENT
Start: 2023-03-17 | End: 2023-03-17

## 2023-03-17 RX ORDER — KETOROLAC TROMETHAMINE 30 MG/ML
INJECTION, SOLUTION INTRAMUSCULAR; INTRAVENOUS
Status: DISCONTINUED | OUTPATIENT
Start: 2023-03-17 | End: 2023-03-17

## 2023-03-17 RX ORDER — MORPHINE SULFATE 2 MG/ML
2 INJECTION, SOLUTION INTRAMUSCULAR; INTRAVENOUS EVERY 5 MIN PRN
Status: DISCONTINUED | OUTPATIENT
Start: 2023-03-17 | End: 2023-03-17 | Stop reason: HOSPADM

## 2023-03-17 RX ORDER — ROCURONIUM BROMIDE 10 MG/ML
INJECTION, SOLUTION INTRAVENOUS
Status: DISCONTINUED | OUTPATIENT
Start: 2023-03-17 | End: 2023-03-17

## 2023-03-17 RX ORDER — LIDOCAINE HYDROCHLORIDE 10 MG/ML
INJECTION INFILTRATION; PERINEURAL
Status: DISCONTINUED
Start: 2023-03-17 | End: 2023-03-17 | Stop reason: HOSPADM

## 2023-03-17 RX ORDER — ONDANSETRON 2 MG/ML
4 INJECTION INTRAMUSCULAR; INTRAVENOUS DAILY PRN
Status: DISCONTINUED | OUTPATIENT
Start: 2023-03-17 | End: 2023-03-17 | Stop reason: HOSPADM

## 2023-03-17 RX ORDER — GLYCOPYRROLATE 0.2 MG/ML
INJECTION INTRAMUSCULAR; INTRAVENOUS
Status: DISCONTINUED | OUTPATIENT
Start: 2023-03-17 | End: 2023-03-17

## 2023-03-17 RX ORDER — CEFAZOLIN SODIUM 1 G/3ML
INJECTION, POWDER, FOR SOLUTION INTRAMUSCULAR; INTRAVENOUS
Status: DISCONTINUED | OUTPATIENT
Start: 2023-03-17 | End: 2023-03-17

## 2023-03-17 RX ORDER — PROPOFOL 10 MG/ML
VIAL (ML) INTRAVENOUS
Status: DISCONTINUED | OUTPATIENT
Start: 2023-03-17 | End: 2023-03-17

## 2023-03-17 RX ORDER — FENTANYL CITRATE 50 UG/ML
INJECTION, SOLUTION INTRAMUSCULAR; INTRAVENOUS
Status: DISCONTINUED | OUTPATIENT
Start: 2023-03-17 | End: 2023-03-17

## 2023-03-17 RX ORDER — LIDOCAINE HYDROCHLORIDE 10 MG/ML
1 INJECTION, SOLUTION EPIDURAL; INFILTRATION; INTRACAUDAL; PERINEURAL ONCE
Status: DISCONTINUED | OUTPATIENT
Start: 2023-03-17 | End: 2023-03-17 | Stop reason: HOSPADM

## 2023-03-17 RX ORDER — LIDOCAINE HYDROCHLORIDE 20 MG/ML
INJECTION INTRAVENOUS
Status: DISCONTINUED | OUTPATIENT
Start: 2023-03-17 | End: 2023-03-17

## 2023-03-17 RX ORDER — SODIUM CHLORIDE 0.9 % (FLUSH) 0.9 %
10 SYRINGE (ML) INJECTION
Status: DISCONTINUED | OUTPATIENT
Start: 2023-03-17 | End: 2023-03-17 | Stop reason: HOSPADM

## 2023-03-17 RX ORDER — EPINEPHRINE 1 MG/ML
INJECTION, SOLUTION, CONCENTRATE INTRAVENOUS
Status: DISCONTINUED
Start: 2023-03-17 | End: 2023-03-17 | Stop reason: HOSPADM

## 2023-03-17 RX ORDER — SODIUM CHLORIDE, SODIUM LACTATE, POTASSIUM CHLORIDE, CALCIUM CHLORIDE 600; 310; 30; 20 MG/100ML; MG/100ML; MG/100ML; MG/100ML
INJECTION, SOLUTION INTRAVENOUS CONTINUOUS
Status: ACTIVE | OUTPATIENT
Start: 2023-03-17

## 2023-03-17 RX ORDER — MIDAZOLAM HYDROCHLORIDE 1 MG/ML
2 INJECTION INTRAMUSCULAR; INTRAVENOUS ONCE AS NEEDED
Status: COMPLETED | OUTPATIENT
Start: 2023-03-17 | End: 2023-03-17

## 2023-03-17 RX ORDER — OXYCODONE HYDROCHLORIDE 5 MG/1
5 TABLET ORAL
Status: DISCONTINUED | OUTPATIENT
Start: 2023-03-17 | End: 2023-03-17 | Stop reason: HOSPADM

## 2023-03-17 RX ADMIN — LIDOCAINE HYDROCHLORIDE 50 MG: 20 INJECTION INTRAVENOUS at 07:03

## 2023-03-17 RX ADMIN — KETOROLAC TROMETHAMINE 30 MG: 30 INJECTION, SOLUTION INTRAMUSCULAR; INTRAVENOUS at 07:03

## 2023-03-17 RX ADMIN — PROPOFOL 150 MG: 10 INJECTION, EMULSION INTRAVENOUS at 07:03

## 2023-03-17 RX ADMIN — ONDANSETRON 4 MG: 2 INJECTION INTRAMUSCULAR; INTRAVENOUS at 07:03

## 2023-03-17 RX ADMIN — CEFAZOLIN 2 G: 330 INJECTION, POWDER, FOR SOLUTION INTRAMUSCULAR; INTRAVENOUS at 07:03

## 2023-03-17 RX ADMIN — DEXAMETHASONE SODIUM PHOSPHATE 8 MG: 4 INJECTION, SOLUTION INTRA-ARTICULAR; INTRALESIONAL; INTRAMUSCULAR; INTRAVENOUS; SOFT TISSUE at 07:03

## 2023-03-17 RX ADMIN — GLYCOPYRROLATE 0.8 MG: 0.2 INJECTION INTRAMUSCULAR; INTRAVENOUS at 07:03

## 2023-03-17 RX ADMIN — SODIUM CHLORIDE, POTASSIUM CHLORIDE, SODIUM LACTATE AND CALCIUM CHLORIDE: 600; 310; 30; 20 INJECTION, SOLUTION INTRAVENOUS at 06:03

## 2023-03-17 RX ADMIN — MIDAZOLAM HYDROCHLORIDE 2 MG: 1 INJECTION, SOLUTION INTRAMUSCULAR; INTRAVENOUS at 06:03

## 2023-03-17 RX ADMIN — FENTANYL CITRATE 50 MCG: 50 INJECTION, SOLUTION INTRAMUSCULAR; INTRAVENOUS at 07:03

## 2023-03-17 RX ADMIN — NEOSTIGMINE METHYLSULFATE 5 MG: 1 INJECTION INTRAVENOUS at 07:03

## 2023-03-17 RX ADMIN — ROCURONIUM BROMIDE 40 MG: 10 INJECTION, SOLUTION INTRAVENOUS at 07:03

## 2023-03-17 NOTE — ANESTHESIA POSTPROCEDURE EVALUATION
Anesthesia Post Evaluation    Patient: Jeane Cohen    Procedure(s) Performed: Procedure(s) (LRB):  EXCISION, MASS, NECK (Bilateral)    Final Anesthesia Type: general      Post-procedure vital signs: reviewed and stable  Airway patency: patent      Anesthetic complications: no      Cardiovascular status: hemodynamically stable  Respiratory status: spontaneous ventilation  Follow-up not needed.          Vitals Value Taken Time   /77 03/17/23 0930   Temp 36.4 °C (97.5 °F) 03/17/23 0930   Pulse 54 03/17/23 0930   Resp 16 03/17/23 0930   SpO2 100 % 03/17/23 0930         Event Time   Out of Recovery 08:28:00         Pain/Gary Score: Gary Score: 10 (3/17/2023  8:30 AM)  Modified Gary Score: 20 (3/17/2023  9:30 AM)

## 2023-03-17 NOTE — BRIEF OP NOTE
[facility] - Periop Services  Brief Operative Note    Surgery Date: 3/17/2023     Surgeon(s) and Role:     * Jaron Son MD - Primary    Assisting Surgeon: None    Pre-op Diagnosis:  Cervical lymphadenopathy  History of Lymphoma    Post-op Diagnosis:  Cervical lymphadenopathy  History of Lymphoma    Procedure(s) (LRB):  EXCISION, MASS, NECK (Bilateral)    Anesthesia: General    Operative Findings: 2.5 cm lymph node involving the right cervical level Ia    Estimated Blood Loss: 5 mL         Specimens:   Specimen (24h ago, onward)       Start     Ordered    03/17/23 0737  Specimen to Pathology  RELEASE UPON ORDERING        Comments: Specimen A: LYMPHOMA PROTOCOL         References:    Click here for ordering Quick Tip   Question:  Release to patient  Answer:  Immediate    03/17/23 0737                      Discharge Note    OUTCOME: Patient tolerated treatment/procedure well without complication and is now ready for discharge.    DISPOSITION: Home or Self Care    FINAL DIAGNOSIS:  cervical lymphadenopathy and history of lymphoma\    FOLLOWUP: In clinic    DISCHARGE INSTRUCTIONS:    Discharge Procedure Orders   Diet Adult Regular     Lifting restrictions   Order Comments: Avoid heavy lifting (>10 lbs) for 2 weeks     Leave dressing on - Keep it clean, dry, and intact until clinic visit        Clinical Reference Documents Added to Patient Instructions         Document    [facility] REMOVAL DISCHARGE INSTRUCTIONS (ENGLISH)

## 2023-03-17 NOTE — TRANSFER OF CARE
Anesthesia Transfer of Care Note    Patient: Jeane Cohen    Procedure(s) Performed: Procedure(s) (LRB):  EXCISION, MASS, NECK (Bilateral)    Patient location: PACU    Anesthesia Type: general    Transport from OR: Transported from OR on room air with adequate spontaneous ventilation    Post pain: adequate analgesia    Post assessment: no apparent anesthetic complications and tolerated procedure well    Post vital signs: stable    Level of consciousness: sedated    Nausea/Vomiting: no nausea/vomiting    Complications: none    Transfer of care protocol was followedComments: Report to [facility]      Last vitals:   Visit Vitals  BP (!) 140/89   Pulse 72   Temp 36.3 °C (97.3 °F) (Temporal)   Resp 20   Wt 73.9 kg (162 lb 14.7 oz)   SpO2 100%   Breastfeeding No   BMI 28.86 kg/m²

## 2023-03-17 NOTE — DISCHARGE INSTRUCTIONS
· Keep follow up appointment at the [facility].  Resume home medications unless otherwise instructed by your doctor.    · No heavy lifting over 10 pounds or straining for 1-2 weeks.    · You may take a shower tomorrow. You may let water run over incision, but leave the steri-strips on your incision until return to clinic.  If the dressing falls off, gently cleanse area with soap and water, pat dry. Apply ointment to keep sutures moist till return to clinic.    · Do not soak your wound in water until cleared by MD. Do not take baths, swim, or use a hot tub until your doctor says it is okay.    · Use pain medication as instructed. If you are experiencing severe pain even with your pain medications, please call the ENT clinic at [phone] to notify your doctor.    · You may use an ice pack as needed for 20 minutes at a time over your incision site to minimize swelling and help relieve pain.    · Do not drink alcohol or drive today, or as long as you are on pain medication.    · Notify MD of any moderate to severe pain unrelieved by pain medicine, if your incision opens and/or bleeds, or for any signs of infection including fever above 100.4, excessive redness or swelling, yellow/green foul- smelling drainage, nausea or vomiting. Clinics number is [phone]. If it is after business hours or emergency call [phone] and state Im having post op complications and need to speak to the ENT surgeon on call.    · Thanks for choosing [facility]! Have a smooth recovery!

## 2023-03-17 NOTE — H&P
Patient Name: Jeane Cohen   YOB: 1955     Chief Complaint:  Cervical lymphadenopathy       History of Present Illness:  March 14, 2023:   68-year-old female with a history of follicular non-Hodgkin's lymphoma, grade 1, stage IV which was initially diagnosed in January of 2017, which was treated with bendamustine/Rituxan x6 (02/13/2017-08/21/2017) referred for evaluation of cervical lymphadenopathy.  The patient had been without evidence of recurrent disease on surveillance CT scans done up until February 24, 2022.  On the CT scan done on February 24, 2022 patient was noted to have some submental lymph nodes which were felt to be nonspecific which showed a slight increase in size.  On follow-up CT scan done on January 27, 2023, was noted that there was some interval enlargement of these lymph nodes as well as a another lymph node in the left posterior cervical triangle.  PET-CT scan done on March 9, 2023 did show presence of hypermetabolic cervical lymphadenopathy and also left pelvic lymphadenopathy.  PET-CT scan showed the presence of FDG avid lymphadenopathy in the right submental region where there appeared to be a conglomeration of at least 3 lymph nodes and also an FDG avid cervical lymph node in posterior cervical triangle.  CT scans and PET scan were reviewed today.  Patient has indicated that she has had a subjective fever for the past couple of months.  She has approximately 13 lb weight loss since October of 2022 going from 176 lb to 163 lb.  She has been experiencing night sweats on an average of once monthly.  Recently she is had nonproductive cough associated with throat clearing and hoarseness.  No purulent sputum or hemoptysis.  She is also been experiencing some pressure in her ears.    3/17/23:  Here today for scheduled surgery.  Was seen in clinic 3 days ago.  Doing well, no changes since then.  NPO for surgery.  Not taking any blood thinners.    Past Medical History:  Past Medical  History:   Diagnosis Date    Chronic hoarseness     Large cell, follicular non-Hodgkin's lymphoma     Microscopic hematuria     Prediabetes     Vitamin D deficiency      Past Surgical History:   Procedure Laterality Date    catheter insertion mediport  01/25/2017    COLONOSCOPY  09/01/2017    HYSTERECTOMY  2002    Laparoscopy exploratory  01/25/2017    removal of mediport  06/10/2019    TUBAL LIGATION         Review of Systems:  Unremarkable except as mentioned above.    Current Medications:  Current Facility-Administered Medications   Medication    ceFAZolin (ANCEF) 2 g in dextrose 5 % (D5W) 50 mL IVPB    lactated ringers infusion    LIDOcaine (PF) 10 mg/ml (1%) injection 10 mg    midazolam (VERSED) 1 mg/mL injection 2 mg    midazolam (VERSED) 1 mg/mL injection        Allergies:  Review of patient's allergies indicates:  No Known Allergies     Physical Exam:  Vital signs:   Vitals:    03/17/23 0507 03/17/23 0611   BP:  (!) 151/83   Weight: 73.9 kg (162 lb 14.7 oz)    General:  Well-developed well-nourished female in no acute distress.  Voice is mildly hoarse.  Head and face:  Normocephalic.  No facial lesions.  No temporomandibular joint tenderness or click.  Ears:  Right ear-auricle is normally developed.  External auditory canal is clear.  Tympanic membrane is nonerythematous.  No middle ear effusion.  Left ear-auricle is normally developed.  External auditory canal is clear.  Tympanic membrane is nonerythematous.  No middle ear effusion.  Nose:  Nasal dorsum is unremarkable.  No significant septal deviation.  No significant intranasal congestion.  Secretions are clear.  Oral cavity and oropharynx:  Tongue and floor mouth are without lesions.  Mucosa is moist.  No pharyngeal erythema or exudates.  No oropharyngeal masses.  No tonsillar hypertrophy.  Neck:  There is an approximately 2-2-1/2 cm palpable mobile mass in the right upper neck just to the right of midline at the level of hyoid bone corresponds to the  FDG avid lesion noted on the PET scan.  Other FDG avid lymph node noted in the right neck in level 5 was not palpable.  Was no other palpable adenopathy.  No thyromegaly.  Trachea is in the midline.  Parotid and submandibular glands are normal to palpation.    Cardiovascular: Regular rate and rhythm without murmurs.    Chest:  Clear to auscultation.    Eyes:  Extraocular muscles intact.  No nystagmus.  No exophthalmos or enophthalmos.  Neurologic:  Alert and oriented.  Cranial nerves 2-12 are grossly normal.    Procedure note: Flexible laryngoscopy.  The nose was prepped with 1% phenyleprine nasal spray and tetracaine topically. The flexible fiberoptic laryngoscope was introduced into the right nostril and advanced. Examination of the nose showed the above mentioned findings. Examination of the nasopharynx showed no nasopharyngeal masses or eustachian tube obstruction. Examination of the base of tongue showed mild lingual tonsil hypertrophy.  No other abnormalities of the base of tongue.. Laryngeal examination showed normal vocal cord mobility bilaterally.  No laryngeal masses or lesions.  No erythema. Examination of the hypopharynx showed no hypopharyngeal masses or pooling of secretions in the piriform sinuses.       Assessment/Plan:  68-year-old female with a history of follicular non-Hodgkin's lymphoma, grade 1, stage IV with FDG avid cervical lymphadenopathy which may represent recurrence.    Plan:  To OR today for excisional lymph node biopsy.  Patient is scheduled for excisional biopsy of right submandibular lymphadenopathy.  Risks of the procedure discussed with the patient and her  and son.  Questions were answered.      Santos Ireland MD  [facility]

## 2023-03-17 NOTE — ANESTHESIA PROCEDURE NOTES
Intubation    Date/Time: 3/17/2023 7:07 AM  Performed by: Azael Vazquez CRNA  Authorized by: Caitlin Pfeiffer MD     Intubation:     Induction:  Intravenous    Intubated:  Postinduction    Mask Ventilation:  Easy mask    Attempts:  1    Attempted By:  CRNA    Method of Intubation:  Direct    Blade:  Jurado 3    Laryngeal View Grade: Grade I - full view of cords      Difficult Airway Encountered?: No      Complications:  None    Airway Device:  Oral endotracheal tube    Airway Device Size:  7.5    Style/Cuff Inflation:  Cuffed (inflated to minimal occlusive pressure)    Inflation Amount (mL):  6    Tube secured:  22    Secured at:  The lips    Placement Verified By:  Capnometry    Complicating Factors:  None    Findings Post-Intubation:  BS equal bilateral and atraumatic/condition of teeth unchanged

## 2023-03-20 VITALS
BODY MASS INDEX: 28.86 KG/M2 | RESPIRATION RATE: 16 BRPM | WEIGHT: 162.94 LBS | OXYGEN SATURATION: 100 % | SYSTOLIC BLOOD PRESSURE: 134 MMHG | HEART RATE: 54 BPM | DIASTOLIC BLOOD PRESSURE: 77 MMHG | TEMPERATURE: 98 F

## 2023-03-22 ENCOUNTER — OFFICE VISIT (OUTPATIENT)
Dept: OTOLARYNGOLOGY | Facility: CLINIC | Age: 68
End: 2023-03-22
Payer: MEDICARE

## 2023-03-22 VITALS
SYSTOLIC BLOOD PRESSURE: 143 MMHG | WEIGHT: 163 LBS | BODY MASS INDEX: 28.87 KG/M2 | HEART RATE: 84 BPM | DIASTOLIC BLOOD PRESSURE: 83 MMHG

## 2023-03-22 DIAGNOSIS — C82.80 FOLLICULAR LARGE CELL NON-HODGKIN'S LYMPHOMA: Primary | ICD-10-CM

## 2023-03-22 DIAGNOSIS — R22.1 NECK MASS: ICD-10-CM

## 2023-03-22 LAB
BEAKER SEE SCANNED REPORT: NORMAL
DHEA SERPL-MCNC: NORMAL
ESTROGEN SERPL-MCNC: NORMAL PG/ML
INSULIN SERPL-ACNC: NORMAL U[IU]/ML
LAB AP CLINICAL INFORMATION: NORMAL
LAB AP GROSS DESCRIPTION: NORMAL
LAB AP REPORT FOOTNOTES: NORMAL
T3RU NFR SERPL: NORMAL %

## 2023-03-22 PROCEDURE — 99214 OFFICE O/P EST MOD 30 MIN: CPT | Mod: PBBFAC | Performed by: STUDENT IN AN ORGANIZED HEALTH CARE EDUCATION/TRAINING PROGRAM

## 2023-03-22 NOTE — PROGRESS NOTES
Patient Name: Jeane Cohen   YOB: 1955     Chief Complaint:  Cervical lymphadenopathy       History of Present Illness:  March 14, 2023:   68-year-old female with a history of follicular non-Hodgkin's lymphoma, grade 1, stage IV which was initially diagnosed in January of 2017, which was treated with bendamustine/Rituxan x6 (02/13/2017-08/21/2017) referred for evaluation of cervical lymphadenopathy.  The patient had been without evidence of recurrent disease on surveillance CT scans done up until February 24, 2022.  On the CT scan done on February 24, 2022 patient was noted to have some submental lymph nodes which were felt to be nonspecific which showed a slight increase in size.  On follow-up CT scan done on January 27, 2023, was noted that there was some interval enlargement of these lymph nodes as well as a another lymph node in the left posterior cervical triangle.  PET-CT scan done on March 9, 2023 did show presence of hypermetabolic cervical lymphadenopathy and also left pelvic lymphadenopathy.  PET-CT scan showed the presence of FDG avid lymphadenopathy in the right submental region where there appeared to be a conglomeration of at least 3 lymph nodes and also an FDG avid cervical lymph node in posterior cervical triangle.  CT scans and PET scan were reviewed today.  Patient has indicated that she has had a subjective fever for the past couple of months.  She has approximately 13 lb weight loss since October of 2022 going from 176 lb to 163 lb.  She has been experiencing night sweats on an average of once monthly.  Recently she is had nonproductive cough associated with throat clearing and hoarseness.  No purulent sputum or hemoptysis.  She is also been experiencing some pressure in her ears.    3/22/23:   Patient returns for post-operative follow up after excisional lymph node biopsy.  She is doing well at this time.  No complaints.  Pain is well controlled.  No erythema, swelling or  "drainage from incision.    Past Medical History:  Past Medical History:   Diagnosis Date    Chronic hoarseness     Large cell, follicular non-Hodgkin's lymphoma     Microscopic hematuria     Prediabetes     Vitamin D deficiency      Past Surgical History:   Procedure Laterality Date    catheter insertion mediport  01/25/2017    COLONOSCOPY  09/01/2017    HYSTERECTOMY  2002    Laparoscopy exploratory  01/25/2017    NECK MASS EXCISION Bilateral 3/17/2023    Procedure: EXCISION, MASS, NECK;  Surgeon: Jaron Son MD;  Location: [facility];  Service: ENT;  Laterality: Bilateral;    removal of mediport  06/10/2019    TUBAL LIGATION         Review of Systems:  Unremarkable except as mentioned above.    Current Medications:  Current Outpatient Medications   Medication Sig    aspirin (ECOTRIN) 81 MG EC tablet Take 81 mg by mouth once. "Every other day"    b complex vitamins tablet Take 1 tablet by mouth once daily.    baclofen (LIORESAL) 10 MG tablet     baclofen (LIORESAL) 20 MG tablet Take 20 mg by mouth every 8 (eight) hours as needed.    calcium carbonate 1250 MG capsule Take 1,250 mg by mouth 2 (two) times daily with meals.    ibuprofen (ADVIL,MOTRIN) 600 MG tablet Take 600 mg by mouth every 6 (six) hours as needed.    magnesium 30 mg Tab Take by mouth once.    meloxicam (MOBIC) 7.5 MG tablet     meloxicam (MOBIC) 7.5 MG tablet Take 7.5 mg by mouth.    multivitamin capsule Take 1 capsule by mouth once daily.    vitamin D (VITAMIN D3) 1000 units Tab Take 1,000 Units by mouth once daily.    zinc gluconate 50 mg tablet Take 50 mg by mouth once daily.     Current Facility-Administered Medications   Medication    sodium chloride 0.9% flush 10 mL     Facility-Administered Medications Ordered in Other Visits   Medication    lactated ringers infusion        Allergies:  Review of patient's allergies indicates:  No Known Allergies     Physical Exam:  Vital signs:   There were no vitals filed for this visit.  General:  " Well-developed well-nourished female in no acute distress.  Voice is mildly hoarse.  Head and face:  Normocephalic.  No facial lesions.  No temporomandibular joint tenderness or click.  Ears:  Right ear-auricle is normally developed.  External auditory canal is clear.  Tympanic membrane is nonerythematous.  No middle ear effusion.  Left ear-auricle is normally developed.  External auditory canal is clear.  Tympanic membrane is nonerythematous.  No middle ear effusion.  Nose:  Nasal dorsum is unremarkable.  No significant septal deviation.  No significant intranasal congestion.  Secretions are clear.  Oral cavity and oropharynx:  Tongue and floor mouth are without lesions.  Mucosa is moist.  No pharyngeal erythema or exudates.  No oropharyngeal masses.  No tonsillar hypertrophy.  Neck:  midline neck incision is clean/dry/intact.  No thyromegaly.  Trachea is in the midline.  Parotid and submandibular glands are normal to palpation.    Cardiovascular: Regular rate and rhythm without murmurs.    Chest:  Clear to auscultation.    Eyes:  Extraocular muscles intact.  No nystagmus.  No exophthalmos or enophthalmos.  Neurologic:  Alert and oriented.  Cranial nerves 2-12 are grossly normal.    Procedure note: Flexible laryngoscopy.  The nose was prepped with 1% phenyleprine nasal spray and tetracaine topically. The flexible fiberoptic laryngoscope was introduced into the right nostril and advanced. Examination of the nose showed the above mentioned findings. Examination of the nasopharynx showed no nasopharyngeal masses or eustachian tube obstruction. Examination of the base of tongue showed mild lingual tonsil hypertrophy.  No other abnormalities of the base of tongue.. Laryngeal examination showed normal vocal cord mobility bilaterally.  No laryngeal masses or lesions.  No erythema. Examination of the hypopharynx showed no hypopharyngeal masses or pooling of secretions in the piriform sinuses.        Assessment/Plan:  68-year-old female with a history of follicular non-Hodgkin's lymphoma, grade 1, stage IV with FDG avid cervical lymphadenopathy which may represent recurrence.  She is s/p excisional lymph node biopsy.    Plan:  - Pathology is still pending.  - She is healing well post-operativley.  - Continue wound care.  Keep incision clean and dry.  - Okay to shower and wash with soap and water and gently pat incision dry.  - Apply aquaphor to incision TID.  Keep out of direct sunlight for optimal healing.      Santos Ireland MD  Otolaryngology

## 2023-03-29 ENCOUNTER — CLINICAL SUPPORT (OUTPATIENT)
Dept: OTOLARYNGOLOGY | Facility: CLINIC | Age: 68
End: 2023-03-29
Payer: MEDICARE

## 2023-03-29 DIAGNOSIS — C82.80 FOLLICULAR LARGE CELL NON-HODGKIN'S LYMPHOMA: Primary | ICD-10-CM

## 2023-03-29 NOTE — PROGRESS NOTES
Patient Name: Jeane Cohen   YOB: 1955     Chief Complaint:  Cervical lymphadenopathy       History of Present Illness:  68-year-old female with a history of follicular non-Hodgkin's lymphoma, grade 1, stage IV which was initially diagnosed in January of 2017, which was treated with bendamustine/Rituxan x6 (02/13/2017-08/21/2017) referred for evaluation of cervical lymphadenopathy.  The patient had been without evidence of recurrent disease on surveillance CT scans done up until February 24, 2022.  On the CT scan done on February 24, 2022 patient was noted to have some submental lymph nodes which were felt to be nonspecific which showed a slight increase in size.  On follow-up CT scan done on January 27, 2023, was noted that there was some interval enlargement of these lymph nodes as well as a another lymph node in the left posterior cervical triangle.  PET-CT scan done on March 9, 2023 did show presence of hypermetabolic cervical lymphadenopathy and also left pelvic lymphadenopathy.  PET-CT scan showed the presence of FDG avid lymphadenopathy in the right submental region where there appeared to be a conglomeration of at least 3 lymph nodes and also an FDG avid cervical lymph node in posterior cervical triangle.      She is s/p excisional lymph node biopsy of 3/17/23.  She is doing well at this time.  No complaints.  Pain is well controlled.  No erythema, swelling or drainage from incision.        Past Medical History:  Past Medical History:   Diagnosis Date    Chronic hoarseness     Large cell, follicular non-Hodgkin's lymphoma     Microscopic hematuria     Prediabetes     Vitamin D deficiency      Past Surgical History:   Procedure Laterality Date    catheter insertion mediport  01/25/2017    COLONOSCOPY  09/01/2017    HYSTERECTOMY  2002    Laparoscopy exploratory  01/25/2017    NECK MASS EXCISION Bilateral 3/17/2023    Procedure: EXCISION, MASS, NECK;  Surgeon: Jaron Son MD;  Location: [facility]  "OR;  Service: ENT;  Laterality: Bilateral;    removal of mediport  06/10/2019    TUBAL LIGATION         Review of Systems:  Unremarkable except as mentioned above.    Current Medications:  Current Outpatient Medications   Medication Sig    aspirin (ECOTRIN) 81 MG EC tablet Take 81 mg by mouth once. "Every other day"    b complex vitamins tablet Take 1 tablet by mouth once daily.    baclofen (LIORESAL) 10 MG tablet     baclofen (LIORESAL) 20 MG tablet Take 20 mg by mouth every 8 (eight) hours as needed.    calcium carbonate 1250 MG capsule Take 1,250 mg by mouth 2 (two) times daily with meals.    ibuprofen (ADVIL,MOTRIN) 600 MG tablet Take 600 mg by mouth every 6 (six) hours as needed.    magnesium 30 mg Tab Take by mouth once.    meloxicam (MOBIC) 7.5 MG tablet     meloxicam (MOBIC) 7.5 MG tablet Take 7.5 mg by mouth.    multivitamin capsule Take 1 capsule by mouth once daily.    vitamin D (VITAMIN D3) 1000 units Tab Take 1,000 Units by mouth once daily.    zinc gluconate 50 mg tablet Take 50 mg by mouth once daily.     Current Facility-Administered Medications   Medication    sodium chloride 0.9% flush 10 mL     Facility-Administered Medications Ordered in Other Visits   Medication    lactated ringers infusion        Allergies:  Review of patient's allergies indicates:  No Known Allergies     Physical Exam:  Unable to assess given the nature of the exam    Pathology:      Assessment/Plan:  68-year-old female with a history of follicular non-Hodgkin's lymphoma, grade 1, stage IV with FDG avid cervical lymphadenopathy which may represent recurrence.  She is s/p excisional lymph node biopsy on 3/17/23.    Plan:  - Pathology negative.  - She is healing well post-operativley.  - Continue wound care. Apply aquaphor to incision TID.  Keep out of direct sunlight for optimal healing.  - Follow up with Dr. Garcia on 4/24/23.  - RTC for final wound check on 5/9/23.  Will also follow up on Dr. Mace's impression and any " further recommendations from their visit.        Santos Ireland MD  Otolaryngology

## 2023-04-19 NOTE — PROGRESS NOTES
I reviewed the history and physical exam with the resident.   I agree with findings and plan.    Jaron Son M.D.

## 2023-04-24 ENCOUNTER — OFFICE VISIT (OUTPATIENT)
Dept: HEMATOLOGY/ONCOLOGY | Facility: CLINIC | Age: 68
End: 2023-04-24
Attending: INTERNAL MEDICINE
Payer: MEDICARE

## 2023-04-24 VITALS
OXYGEN SATURATION: 100 % | RESPIRATION RATE: 20 BRPM | TEMPERATURE: 98 F | WEIGHT: 165 LBS | BODY MASS INDEX: 27.49 KG/M2 | DIASTOLIC BLOOD PRESSURE: 96 MMHG | HEIGHT: 65 IN | HEART RATE: 66 BPM | SYSTOLIC BLOOD PRESSURE: 142 MMHG

## 2023-04-24 DIAGNOSIS — R31.29 MICROSCOPIC HEMATURIA: ICD-10-CM

## 2023-04-24 DIAGNOSIS — C82.80 FOLLICULAR LARGE CELL NON-HODGKIN'S LYMPHOMA: Primary | ICD-10-CM

## 2023-04-24 DIAGNOSIS — Z12.31 OTHER SCREENING MAMMOGRAM: ICD-10-CM

## 2023-04-24 DIAGNOSIS — R94.8 ABNORMAL POSITRON EMISSION TOMOGRAPHY (PET) SCAN: ICD-10-CM

## 2023-04-24 DIAGNOSIS — R59.0 PELVIC LYMPHADENOPATHY: ICD-10-CM

## 2023-04-24 DIAGNOSIS — R59.0 CERVICAL LYMPHADENOPATHY: ICD-10-CM

## 2023-04-24 PROCEDURE — 3288F FALL RISK ASSESSMENT DOCD: CPT | Mod: CPTII,,, | Performed by: INTERNAL MEDICINE

## 2023-04-24 PROCEDURE — 3008F PR BODY MASS INDEX (BMI) DOCUMENTED: ICD-10-PCS | Mod: CPTII,,, | Performed by: INTERNAL MEDICINE

## 2023-04-24 PROCEDURE — 1126F PR PAIN SEVERITY QUANTIFIED, NO PAIN PRESENT: ICD-10-PCS | Mod: CPTII,,, | Performed by: INTERNAL MEDICINE

## 2023-04-24 PROCEDURE — 3288F PR FALLS RISK ASSESSMENT DOCUMENTED: ICD-10-PCS | Mod: CPTII,,, | Performed by: INTERNAL MEDICINE

## 2023-04-24 PROCEDURE — 3080F DIAST BP >= 90 MM HG: CPT | Mod: CPTII,,, | Performed by: INTERNAL MEDICINE

## 2023-04-24 PROCEDURE — 3077F SYST BP >= 140 MM HG: CPT | Mod: CPTII,,, | Performed by: INTERNAL MEDICINE

## 2023-04-24 PROCEDURE — 3008F BODY MASS INDEX DOCD: CPT | Mod: CPTII,,, | Performed by: INTERNAL MEDICINE

## 2023-04-24 PROCEDURE — 1160F PR REVIEW ALL MEDS BY PRESCRIBER/CLIN PHARMACIST DOCUMENTED: ICD-10-PCS | Mod: CPTII,,, | Performed by: INTERNAL MEDICINE

## 2023-04-24 PROCEDURE — 1101F PR PT FALLS ASSESS DOC 0-1 FALLS W/OUT INJ PAST YR: ICD-10-PCS | Mod: CPTII,,, | Performed by: INTERNAL MEDICINE

## 2023-04-24 PROCEDURE — 1159F PR MEDICATION LIST DOCUMENTED IN MEDICAL RECORD: ICD-10-PCS | Mod: CPTII,,, | Performed by: INTERNAL MEDICINE

## 2023-04-24 PROCEDURE — 1159F MED LIST DOCD IN RCRD: CPT | Mod: CPTII,,, | Performed by: INTERNAL MEDICINE

## 2023-04-24 PROCEDURE — 3077F PR MOST RECENT SYSTOLIC BLOOD PRESSURE >= 140 MM HG: ICD-10-PCS | Mod: CPTII,,, | Performed by: INTERNAL MEDICINE

## 2023-04-24 PROCEDURE — 1126F AMNT PAIN NOTED NONE PRSNT: CPT | Mod: CPTII,,, | Performed by: INTERNAL MEDICINE

## 2023-04-24 PROCEDURE — 1160F RVW MEDS BY RX/DR IN RCRD: CPT | Mod: CPTII,,, | Performed by: INTERNAL MEDICINE

## 2023-04-24 PROCEDURE — 99214 OFFICE O/P EST MOD 30 MIN: CPT | Mod: S$PBB,,, | Performed by: INTERNAL MEDICINE

## 2023-04-24 PROCEDURE — 1101F PT FALLS ASSESS-DOCD LE1/YR: CPT | Mod: CPTII,,, | Performed by: INTERNAL MEDICINE

## 2023-04-24 PROCEDURE — 3080F PR MOST RECENT DIASTOLIC BLOOD PRESSURE >= 90 MM HG: ICD-10-PCS | Mod: CPTII,,, | Performed by: INTERNAL MEDICINE

## 2023-04-24 PROCEDURE — 99214 OFFICE O/P EST MOD 30 MIN: CPT | Mod: PBBFAC | Performed by: INTERNAL MEDICINE

## 2023-04-24 PROCEDURE — 99214 PR OFFICE/OUTPT VISIT, EST, LEVL IV, 30-39 MIN: ICD-10-PCS | Mod: S$PBB,,, | Performed by: INTERNAL MEDICINE
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
## 2023-04-24 NOTE — Clinical Note
Orders for today:   Repeat PET-CT in June  Screening mammogram in February 2024   Follow-up in June with PET-CT, CBC, CMP, and LDH level.

## 2023-05-09 ENCOUNTER — PATIENT MESSAGE (OUTPATIENT)
Dept: RESEARCH | Facility: HOSPITAL | Age: 68
End: 2023-05-09
Payer: MEDICARE

## 2023-05-12 NOTE — OP NOTE
Operative Note    Patient: Jeane Cohen  MRN: [id number]    Date: 3/17/2023    Pre-op Diagnosis:  Cervical lymphadenopathy  History of Lymphoma     Post-op Diagnosis:  Cervical lymphadenopathy  History of Lymphoma     Procedure(s) (LRB):  EXCISION, MASS, NECK (Bilateral)     Anesthesia: General     Operative Findings: 2.5 cm lymph node involving the right cervical level Ia     Estimated Blood Loss: 5 mL           Indication for Procedure: 69 yo female with history of lymphoma s/p chemotherapy presents as referral from oncologist for request of lymph node biopsy given her new onset chronic diffuse cervical lymphadenopathy.    Procedure in Detail:  Risks, benefits and alternatives of the procedure were explained in detail and informed consent was obtained. Patient was taken to the OR and placed on operating table in the supine position. General anesthesia was induced without complication. The neck was prepped and draped in the standard sterile fashion. Timeout was conducted in accordance with hospital policy.    A 4 cm midline incision was marked just over the palpable level 1 lymph node.  1% lidocaine and 1:1000 epinephrine was infiltrated along the planned incision.  15 blade was used to incise through the skin and through platysma.  Subplatysmal flaps were raised for better access to the level 1 submental lymph node space.  The lymph node was carefully dissected out bluntly and removed.  An adjacent lymph node was also removed.  Both specimens were sent for fresh lymphoma protocol.  The surgical field was copiously rinsed with normal saline irrigation.  Bipolar cauterization was used for hemostasis.  The wound was then closed using 3-0 vicryls to reinforced the deep platysmal layer and 5-0 monocryl was used the close the skin in a running subcuticular fashion.       Santos Ireland MD  [facility]

## 2023-05-16 ENCOUNTER — PATIENT MESSAGE (OUTPATIENT)
Dept: RESEARCH | Facility: HOSPITAL | Age: 68
End: 2023-05-16
Payer: MEDICARE

## 2023-06-19 ENCOUNTER — HOSPITAL ENCOUNTER (OUTPATIENT)
Dept: RADIOLOGY | Facility: HOSPITAL | Age: 68
Discharge: HOME | End: 2023-06-19
Attending: INTERNAL MEDICINE
Payer: MEDICARE

## 2023-06-19 DIAGNOSIS — C82.80 FOLLICULAR LARGE CELL NON-HODGKIN'S LYMPHOMA: ICD-10-CM

## 2023-06-19 DIAGNOSIS — R31.29 MICROSCOPIC HEMATURIA: ICD-10-CM

## 2023-06-19 PROCEDURE — A9552 F18 FDG: HCPCS

## 2023-06-26 ENCOUNTER — OFFICE VISIT (OUTPATIENT)
Dept: HEMATOLOGY/ONCOLOGY | Facility: CLINIC | Age: 68
End: 2023-06-26
Attending: INTERNAL MEDICINE
Payer: MEDICARE

## 2023-06-26 VITALS
DIASTOLIC BLOOD PRESSURE: 80 MMHG | WEIGHT: 162 LBS | HEART RATE: 80 BPM | OXYGEN SATURATION: 100 % | HEIGHT: 64 IN | TEMPERATURE: 98 F | RESPIRATION RATE: 20 BRPM | BODY MASS INDEX: 27.66 KG/M2 | SYSTOLIC BLOOD PRESSURE: 123 MMHG

## 2023-06-26 DIAGNOSIS — R59.0 CERVICAL LYMPHADENOPATHY: ICD-10-CM

## 2023-06-26 DIAGNOSIS — C82.80 FOLLICULAR LARGE CELL NON-HODGKIN'S LYMPHOMA: Primary | ICD-10-CM

## 2023-06-26 DIAGNOSIS — R31.29 MICROSCOPIC HEMATURIA: ICD-10-CM

## 2023-06-26 PROCEDURE — 1101F PT FALLS ASSESS-DOCD LE1/YR: CPT | Mod: CPTII,,, | Performed by: INTERNAL MEDICINE

## 2023-06-26 PROCEDURE — 1126F PR PAIN SEVERITY QUANTIFIED, NO PAIN PRESENT: ICD-10-PCS | Mod: CPTII,,, | Performed by: INTERNAL MEDICINE

## 2023-06-26 PROCEDURE — 3079F PR MOST RECENT DIASTOLIC BLOOD PRESSURE 80-89 MM HG: ICD-10-PCS | Mod: CPTII,,, | Performed by: INTERNAL MEDICINE

## 2023-06-26 PROCEDURE — 1101F PR PT FALLS ASSESS DOC 0-1 FALLS W/OUT INJ PAST YR: ICD-10-PCS | Mod: CPTII,,, | Performed by: INTERNAL MEDICINE

## 2023-06-26 PROCEDURE — 1160F RVW MEDS BY RX/DR IN RCRD: CPT | Mod: CPTII,,, | Performed by: INTERNAL MEDICINE

## 2023-06-26 PROCEDURE — 3008F PR BODY MASS INDEX (BMI) DOCUMENTED: ICD-10-PCS | Mod: CPTII,,, | Performed by: INTERNAL MEDICINE

## 2023-06-26 PROCEDURE — 3074F SYST BP LT 130 MM HG: CPT | Mod: CPTII,,, | Performed by: INTERNAL MEDICINE

## 2023-06-26 PROCEDURE — 1159F PR MEDICATION LIST DOCUMENTED IN MEDICAL RECORD: ICD-10-PCS | Mod: CPTII,,, | Performed by: INTERNAL MEDICINE

## 2023-06-26 PROCEDURE — 3079F DIAST BP 80-89 MM HG: CPT | Mod: CPTII,,, | Performed by: INTERNAL MEDICINE

## 2023-06-26 PROCEDURE — 3008F BODY MASS INDEX DOCD: CPT | Mod: CPTII,,, | Performed by: INTERNAL MEDICINE

## 2023-06-26 PROCEDURE — 1159F MED LIST DOCD IN RCRD: CPT | Mod: CPTII,,, | Performed by: INTERNAL MEDICINE

## 2023-06-26 PROCEDURE — 99214 OFFICE O/P EST MOD 30 MIN: CPT | Mod: S$PBB,,, | Performed by: INTERNAL MEDICINE

## 2023-06-26 PROCEDURE — 1126F AMNT PAIN NOTED NONE PRSNT: CPT | Mod: CPTII,,, | Performed by: INTERNAL MEDICINE

## 2023-06-26 PROCEDURE — 99214 OFFICE O/P EST MOD 30 MIN: CPT | Mod: PBBFAC | Performed by: INTERNAL MEDICINE

## 2023-06-26 PROCEDURE — 3288F PR FALLS RISK ASSESSMENT DOCUMENTED: ICD-10-PCS | Mod: CPTII,,, | Performed by: INTERNAL MEDICINE

## 2023-06-26 PROCEDURE — 1160F PR REVIEW ALL MEDS BY PRESCRIBER/CLIN PHARMACIST DOCUMENTED: ICD-10-PCS | Mod: CPTII,,, | Performed by: INTERNAL MEDICINE

## 2023-06-26 PROCEDURE — 99214 PR OFFICE/OUTPT VISIT, EST, LEVL IV, 30-39 MIN: ICD-10-PCS | Mod: S$PBB,,, | Performed by: INTERNAL MEDICINE

## 2023-06-26 PROCEDURE — 3288F FALL RISK ASSESSMENT DOCD: CPT | Mod: CPTII,,, | Performed by: INTERNAL MEDICINE

## 2023-06-26 PROCEDURE — 3074F PR MOST RECENT SYSTOLIC BLOOD PRESSURE < 130 MM HG: ICD-10-PCS | Mod: CPTII,,, | Performed by: INTERNAL MEDICINE
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
## 2023-07-07 ENCOUNTER — TELEPHONE (OUTPATIENT)
Dept: INTERNAL MEDICINE | Facility: CLINIC | Age: 68
End: 2023-07-07
Payer: MEDICARE

## 2023-07-07 NOTE — TELEPHONE ENCOUNTER
----- Message from Muriel Langston LPN sent at 7/7/2023 10:27 AM CDT -----  Regarding: FW: medication request    ----- Message -----  From: Domitila Crane  Sent: 7/6/2023  10:47 AM CDT  To: [facility] Staff  Subject: medication request                               Patient request antibiotic for UTI. Frequent urination, feeling like she is not finishing,itching, feel feverish and no redness to area.  [facility]

## 2023-07-07 NOTE — TELEPHONE ENCOUNTER
Please advise the patient that she would need evaluation with a urine sample. She can either come in and complete one today and If I get those results before end of business day today we can do that but if not, I advise urgent care.  Thanks.

## 2023-07-07 NOTE — TELEPHONE ENCOUNTER
----- Message from Domitila Crane sent at 7/6/2023 10:44 AM CDT -----  Regarding: medication request  Patient request antibiotic for UTI. Frequent urination, feeling like she is not finishing,itching, feel feverish and no redness to area.  [facility]

## 2023-07-07 NOTE — TELEPHONE ENCOUNTER
I contacted patient and gave [facility] precautions. Still having S/S burning itching frequent urination . She will go to [facility] for treatment.

## 2023-09-13 ENCOUNTER — OFFICE VISIT (OUTPATIENT)
Dept: OTOLARYNGOLOGY | Facility: CLINIC | Age: 68
End: 2023-09-13
Attending: INTERNAL MEDICINE
Payer: MEDICARE

## 2023-09-13 ENCOUNTER — OFFICE VISIT (OUTPATIENT)
Dept: HEMATOLOGY/ONCOLOGY | Facility: CLINIC | Age: 68
End: 2023-09-13
Attending: INTERNAL MEDICINE
Payer: MEDICARE

## 2023-09-13 VITALS
SYSTOLIC BLOOD PRESSURE: 132 MMHG | DIASTOLIC BLOOD PRESSURE: 86 MMHG | RESPIRATION RATE: 18 BRPM | HEART RATE: 77 BPM | OXYGEN SATURATION: 100 % | WEIGHT: 162 LBS | TEMPERATURE: 98 F | HEIGHT: 64 IN | BODY MASS INDEX: 27.66 KG/M2

## 2023-09-13 DIAGNOSIS — E55.9 VITAMIN D DEFICIENCY: ICD-10-CM

## 2023-09-13 DIAGNOSIS — R59.0 CERVICAL LYMPHADENOPATHY: ICD-10-CM

## 2023-09-13 DIAGNOSIS — R59.0 CERVICAL LYMPHADENOPATHY: Primary | ICD-10-CM

## 2023-09-13 DIAGNOSIS — C82.80 FOLLICULAR LARGE CELL NON-HODGKIN'S LYMPHOMA: Primary | ICD-10-CM

## 2023-09-13 DIAGNOSIS — Z85.72 HISTORY OF FOLLICULAR LYMPHOMA: ICD-10-CM

## 2023-09-13 DIAGNOSIS — R59.0 PELVIC LYMPHADENOPATHY: ICD-10-CM

## 2023-09-13 DIAGNOSIS — C82.80 FOLLICULAR LARGE CELL NON-HODGKIN'S LYMPHOMA: ICD-10-CM

## 2023-09-13 DIAGNOSIS — R31.29 MICROSCOPIC HEMATURIA: ICD-10-CM

## 2023-09-13 LAB
ALBUMIN SERPL-MCNC: 3.5 G/DL (ref 3.4–4.8)
ALBUMIN/GLOB SERPL: 1.1 RATIO (ref 1.1–2)
ALP SERPL-CCNC: 66 UNIT/L (ref 40–150)
ALT SERPL-CCNC: 14 UNIT/L (ref 0–55)
AST SERPL-CCNC: 17 UNIT/L (ref 5–34)
BASOPHILS # BLD AUTO: 0.03 X10(3)/MCL
BASOPHILS NFR BLD AUTO: 0.7 %
BILIRUB SERPL-MCNC: 0.4 MG/DL
BUN SERPL-MCNC: 9.1 MG/DL (ref 9.8–20.1)
CALCIUM SERPL-MCNC: 9.3 MG/DL (ref 8.4–10.2)
CHLORIDE SERPL-SCNC: 106 MMOL/L (ref 98–107)
CO2 SERPL-SCNC: 30 MMOL/L (ref 23–31)
CREAT SERPL-MCNC: 0.78 MG/DL (ref 0.55–1.02)
EOSINOPHIL # BLD AUTO: 0.17 X10(3)/MCL (ref 0–0.9)
EOSINOPHIL NFR BLD AUTO: 4.1 %
ERYTHROCYTE [DISTWIDTH] IN BLOOD BY AUTOMATED COUNT: 15 % (ref 11.5–17)
GFR SERPLBLD CREATININE-BSD FMLA CKD-EPI: >60 MLS/MIN/1.73/M2
GLOBULIN SER-MCNC: 3.2 GM/DL (ref 2.4–3.5)
GLUCOSE SERPL-MCNC: 83 MG/DL (ref 82–115)
HCT VFR BLD AUTO: 38.4 % (ref 37–47)
HGB BLD-MCNC: 11.8 G/DL (ref 12–16)
IMM GRANULOCYTES # BLD AUTO: 0.01 X10(3)/MCL (ref 0–0.04)
IMM GRANULOCYTES NFR BLD AUTO: 0.2 %
LDH SERPL-CCNC: 163 U/L (ref 125–220)
LYMPHOCYTES # BLD AUTO: 1.31 X10(3)/MCL (ref 0.6–4.6)
LYMPHOCYTES NFR BLD AUTO: 31.7 %
MCH RBC QN AUTO: 26 PG (ref 27–31)
MCHC RBC AUTO-ENTMCNC: 30.7 G/DL (ref 33–36)
MCV RBC AUTO: 84.8 FL (ref 80–94)
MONOCYTES # BLD AUTO: 0.68 X10(3)/MCL (ref 0.1–1.3)
MONOCYTES NFR BLD AUTO: 16.5 %
NEUTROPHILS # BLD AUTO: 1.93 X10(3)/MCL (ref 2.1–9.2)
NEUTROPHILS NFR BLD AUTO: 46.8 %
NRBC BLD AUTO-RTO: 0 %
PLATELET # BLD AUTO: 183 X10(3)/MCL (ref 130–400)
PMV BLD AUTO: 10.5 FL (ref 7.4–10.4)
POTASSIUM SERPL-SCNC: 3.9 MMOL/L (ref 3.5–5.1)
PROT SERPL-MCNC: 6.7 GM/DL (ref 5.8–7.6)
RBC # BLD AUTO: 4.53 X10(6)/MCL (ref 4.2–5.4)
SODIUM SERPL-SCNC: 145 MMOL/L (ref 136–145)
URATE SERPL-MCNC: 3.9 MG/DL (ref 2.6–6)
WBC # SPEC AUTO: 4.13 X10(3)/MCL (ref 4.5–11.5)

## 2023-09-13 PROCEDURE — 36415 COLL VENOUS BLD VENIPUNCTURE: CPT

## 2023-09-13 PROCEDURE — 3008F PR BODY MASS INDEX (BMI) DOCUMENTED: ICD-10-PCS | Mod: CPTII,,, | Performed by: INTERNAL MEDICINE

## 2023-09-13 PROCEDURE — 99214 OFFICE O/P EST MOD 30 MIN: CPT | Mod: S$PBB,,, | Performed by: INTERNAL MEDICINE

## 2023-09-13 PROCEDURE — 99214 PR OFFICE/OUTPT VISIT, EST, LEVL IV, 30-39 MIN: ICD-10-PCS | Mod: S$PBB,,, | Performed by: INTERNAL MEDICINE

## 2023-09-13 PROCEDURE — 1125F AMNT PAIN NOTED PAIN PRSNT: CPT | Mod: CPTII,,, | Performed by: INTERNAL MEDICINE

## 2023-09-13 PROCEDURE — 3288F FALL RISK ASSESSMENT DOCD: CPT | Mod: CPTII,,, | Performed by: INTERNAL MEDICINE

## 2023-09-13 PROCEDURE — 1101F PT FALLS ASSESS-DOCD LE1/YR: CPT | Mod: CPTII,,, | Performed by: INTERNAL MEDICINE

## 2023-09-13 PROCEDURE — 1125F PR PAIN SEVERITY QUANTIFIED, PAIN PRESENT: ICD-10-PCS | Mod: CPTII,,, | Performed by: INTERNAL MEDICINE

## 2023-09-13 PROCEDURE — 3044F HG A1C LEVEL LT 7.0%: CPT | Mod: CPTII,,, | Performed by: INTERNAL MEDICINE

## 2023-09-13 PROCEDURE — 99213 OFFICE O/P EST LOW 20 MIN: CPT | Mod: PBBFAC | Performed by: INTERNAL MEDICINE

## 2023-09-13 PROCEDURE — 3075F PR MOST RECENT SYSTOLIC BLOOD PRESS GE 130-139MM HG: ICD-10-PCS | Mod: CPTII,,, | Performed by: INTERNAL MEDICINE

## 2023-09-13 PROCEDURE — 3079F DIAST BP 80-89 MM HG: CPT | Mod: CPTII,,, | Performed by: INTERNAL MEDICINE

## 2023-09-13 PROCEDURE — 3008F BODY MASS INDEX DOCD: CPT | Mod: CPTII,,, | Performed by: INTERNAL MEDICINE

## 2023-09-13 PROCEDURE — 99214 OFFICE O/P EST MOD 30 MIN: CPT | Mod: PBBFAC,27

## 2023-09-13 PROCEDURE — 3288F PR FALLS RISK ASSESSMENT DOCUMENTED: ICD-10-PCS | Mod: CPTII,,, | Performed by: INTERNAL MEDICINE

## 2023-09-13 PROCEDURE — 1101F PR PT FALLS ASSESS DOC 0-1 FALLS W/OUT INJ PAST YR: ICD-10-PCS | Mod: CPTII,,, | Performed by: INTERNAL MEDICINE

## 2023-09-13 PROCEDURE — 3044F PR MOST RECENT HEMOGLOBIN A1C LEVEL <7.0%: ICD-10-PCS | Mod: CPTII,,, | Performed by: INTERNAL MEDICINE

## 2023-09-13 PROCEDURE — 3079F PR MOST RECENT DIASTOLIC BLOOD PRESSURE 80-89 MM HG: ICD-10-PCS | Mod: CPTII,,, | Performed by: INTERNAL MEDICINE

## 2023-09-13 PROCEDURE — 3075F SYST BP GE 130 - 139MM HG: CPT | Mod: CPTII,,, | Performed by: INTERNAL MEDICINE

## 2023-09-13 RX ORDER — CEFAZOLIN SODIUM 2 G/50ML
2 SOLUTION INTRAVENOUS
Status: CANCELLED | OUTPATIENT
Start: 2023-09-13

## 2023-09-13 RX ORDER — SODIUM CHLORIDE 0.9 % (FLUSH) 0.9 %
10 SYRINGE (ML) INJECTION
Status: DISCONTINUED | OUTPATIENT
Start: 2023-09-13 | End: 2024-01-22

## 2023-09-13 RX ORDER — LIDOCAINE HYDROCHLORIDE 10 MG/ML
1 INJECTION, SOLUTION EPIDURAL; INFILTRATION; INTRACAUDAL; PERINEURAL ONCE
Status: CANCELLED | OUTPATIENT
Start: 2023-09-13 | End: 2023-09-13
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
## 2023-09-13 NOTE — Clinical Note
Contrast-enhanced CT scans of C/A/P/soft tissues of the neck with contrast ASAP  Check CBC, CMP, LDH, uric acid level  Refer to [facility] for consultation ASAP  Follow-up visit with me in 2 weeks.

## 2023-09-14 ENCOUNTER — TELEPHONE (OUTPATIENT)
Dept: HEMATOLOGY/ONCOLOGY | Facility: CLINIC | Age: 68
End: 2023-09-14
Payer: MEDICARE

## 2023-09-14 NOTE — H&P (VIEW-ONLY)
Patient Name: Jeane Cohen   YOB: 1955     Chief Complaint:  Cervical lymphadenopathy       History of Present Illness:  68-year-old female with a history of follicular non-Hodgkin's lymphoma, grade 1, stage IV which was initially diagnosed in January of 2017, which was treated with bendamustine/Rituxan x6 (02/13/2017-08/21/2017) referred for evaluation of cervical lymphadenopathy.  The patient had been without evidence of recurrent disease on surveillance CT scans done up until February 24, 2022.  On the CT scan done on February 24, 2022 patient was noted to have some submental lymph nodes which were felt to be nonspecific which showed a slight increase in size.  On follow-up CT scan done on January 27, 2023, was noted that there was some interval enlargement of these lymph nodes as well as a another lymph node in the left posterior cervical triangle.  PET-CT scan done on March 9, 2023 did show presence of hypermetabolic cervical lymphadenopathy and also left pelvic lymphadenopathy.  PET-CT scan showed the presence of FDG avid lymphadenopathy in the right submental region where there appeared to be a conglomeration of at least 3 lymph nodes and also an FDG avid cervical lymph node in posterior cervical triangle.      She is s/p excisional lymph node biopsy of 3/17/23.  She is doing well at this time.  No complaints.  Pain is well controlled.  No erythema, swelling or drainage from incision.      09/13/2023: Returns in follow-up.  She has undergone a repeat PET in the interim and has followed up with Dr. Mace over concern for lymphoma recurrence.  She denies any unintentional weight loss, night sweats, or new lumps or bumps in her neck.        Past Medical History:  Past Medical History:   Diagnosis Date    Chronic hoarseness     Large cell, follicular non-Hodgkin's lymphoma     Microscopic hematuria     Prediabetes     Vitamin D deficiency      Past Surgical History:   Procedure Laterality Date     "catheter insertion mediport  01/25/2017    COLONOSCOPY  09/01/2017    HYSTERECTOMY  2002    Laparoscopy exploratory  01/25/2017    NECK MASS EXCISION Bilateral 3/17/2023    Procedure: EXCISION, MASS, NECK;  Surgeon: Jaron Son MD;  Location: [facility];  Service: ENT;  Laterality: Bilateral;    removal of mediport  06/10/2019    TUBAL LIGATION         Review of Systems:  Unremarkable except as mentioned above.    Current Medications:  Current Outpatient Medications   Medication Sig    aspirin (ECOTRIN) 81 MG EC tablet Take 81 mg by mouth once. "Every other day"    b complex vitamins tablet Take 1 tablet by mouth once daily.    baclofen (LIORESAL) 10 MG tablet     baclofen (LIORESAL) 20 MG tablet Take 20 mg by mouth every 8 (eight) hours as needed.    calcium carbonate 1250 MG capsule Take 1,250 mg by mouth 2 (two) times daily with meals.    ibuprofen (ADVIL,MOTRIN) 600 MG tablet Take 600 mg by mouth every 6 (six) hours as needed.    magnesium 30 mg Tab Take by mouth once.    meloxicam (MOBIC) 7.5 MG tablet     meloxicam (MOBIC) 7.5 MG tablet Take 7.5 mg by mouth.    multivitamin capsule Take 1 capsule by mouth once daily.    vitamin D (VITAMIN D3) 1000 units Tab Take 1,000 Units by mouth once daily.    zinc gluconate 50 mg tablet Take 50 mg by mouth once daily.     Current Facility-Administered Medications   Medication    sodium chloride 0.9% flush 10 mL    sodium chloride 0.9% flush 10 mL     Facility-Administered Medications Ordered in Other Visits   Medication    lactated ringers infusion        Allergies:  Review of patient's allergies indicates:  No Known Allergies     Physical Exam:  General:  Well-developed well-nourished female in no acute distress.  Voice normal  Head and face:  Normocephalic.  No facial lesions.  No temporomandibular joint tenderness or click.  Ears:  Right ear-auricle is normally developed.  External auditory canal is clear.  Tympanic membrane is nonerythematous.  No middle ear " effusion.  Left ear-auricle is normally developed.  External auditory canal is clear.  Tympanic membrane is nonerythematous.  No middle ear effusion.  Nose:  Nasal dorsum is unremarkable.  No significant septal deviation.  No significant intranasal congestion.  Secretions are clear.  Oral cavity and oropharynx:  Tongue and floor mouth are without lesions.  Mucosa is moist.  No pharyngeal erythema or exudates.  No oropharyngeal masses.  No tonsillar hypertrophy.  Neck:  Previous midline neck incision is well healed.  No palpable lymphadenopathy.  No thyromegaly.  Trachea is in the midline.  Parotid and submandibular glands are normal to palpation.    Cardiovascular: Regular rate and rhythm without murmurs.    Chest:  Clear to auscultation.    Eyes:  Extraocular muscles intact.  No nystagmus.  No exophthalmos or enophthalmos.  Neurologic:  Alert and oriented.  Cranial nerves 2-12 are grossly normal.    Pathology:        PET (4/24/2023):     FINDINGS:  Blood pool max SUV 2.5.     Background liver max SUV 3.5     Activity within the imaged brain is symmetric.  Similar symmetric tonsillar activity.     Again scattered hypermetabolic cervical lymph nodes are seen.  Reference right cervical lymph node deep to the sternocleidomastoid measures 15 mm short axis, previously 10 mm.  The max SUV is 11.0, previously 7.7.  Deauville score 4-5.  A lymph node inferior and medial to this on fused image 43 is also more FDG avid today with max SUV now 4.3.  Deauville score 4.  Max SUV of left cervical chain lymph nodes around fused image 20 is 4.5.  Deauville score 4. The reference right submandibular lymph node is not seen today.     No hypermetabolic mediastinal, hilar or axillary lymph nodes.  No suspicious pulmonary nodule.     The reference left pelvic sidewall lymph node on image 190 series 3 measures 9 mm short axis, no significant change.  The max SUV is 6.6, previously 4.1.  Deauville score 4.  Similar right external iliac  lymph node fused image 165.  Liver and spleen are not significantly enlarged.  Scattered GI activity is likely physiologic.  No inguinal adenopathy.     No suspicious osseous lesion.     Impression:     The reference right submandibular lymph node is no longer seen.  Other lymph nodes are larger or slightly more FDG avid compared to prior.  Deauville scores 4-5.          Assessment/Plan:  68-year-old female with a history of follicular non-Hodgkin's lymphoma, grade 1, stage IV s/p chemotherapy completed in 2017. Patient with FDG avid right level I LN s/p excisional lymph node biopsy on 3/17/23, final pathology was negative.  Interval PET with additional right level III FDG avid node.     Plan:  - Spoke with patient concerning risks, benefits, alternatives of right level III LN excisional biopsy; she wishes to proceed; informed consent obtained   - Patient with CT scheduled on 9/25, when we would like to schedule the surgery; patient expresses that she can essentially undergo a procedure on a Monday due to work constraints; will attempt to obtain prior auth to move scan to earlier date  - RTC 1 week after surgery       Juan Olivares MD  [employer] [facility], PGY-III

## 2023-09-14 NOTE — PROGRESS NOTES
Patient Name: Jeane Cohen   YOB: 1955     Chief Complaint:  Cervical lymphadenopathy       History of Present Illness:  68-year-old female with a history of follicular non-Hodgkin's lymphoma, grade 1, stage IV which was initially diagnosed in January of 2017, which was treated with bendamustine/Rituxan x6 (02/13/2017-08/21/2017) referred for evaluation of cervical lymphadenopathy.  The patient had been without evidence of recurrent disease on surveillance CT scans done up until February 24, 2022.  On the CT scan done on February 24, 2022 patient was noted to have some submental lymph nodes which were felt to be nonspecific which showed a slight increase in size.  On follow-up CT scan done on January 27, 2023, was noted that there was some interval enlargement of these lymph nodes as well as a another lymph node in the left posterior cervical triangle.  PET-CT scan done on March 9, 2023 did show presence of hypermetabolic cervical lymphadenopathy and also left pelvic lymphadenopathy.  PET-CT scan showed the presence of FDG avid lymphadenopathy in the right submental region where there appeared to be a conglomeration of at least 3 lymph nodes and also an FDG avid cervical lymph node in posterior cervical triangle.      She is s/p excisional lymph node biopsy of 3/17/23.  She is doing well at this time.  No complaints.  Pain is well controlled.  No erythema, swelling or drainage from incision.      09/13/2023: Returns in follow-up.  She has undergone a repeat PET in the interim and has followed up with Dr. Mace over concern for lymphoma recurrence.  She denies any unintentional weight loss, night sweats, or new lumps or bumps in her neck.        Past Medical History:  Past Medical History:   Diagnosis Date    Chronic hoarseness     Large cell, follicular non-Hodgkin's lymphoma     Microscopic hematuria     Prediabetes     Vitamin D deficiency      Past Surgical History:   Procedure Laterality Date     "catheter insertion mediport  01/25/2017    COLONOSCOPY  09/01/2017    HYSTERECTOMY  2002    Laparoscopy exploratory  01/25/2017    NECK MASS EXCISION Bilateral 3/17/2023    Procedure: EXCISION, MASS, NECK;  Surgeon: Jaron Son MD;  Location: [facility];  Service: ENT;  Laterality: Bilateral;    removal of mediport  06/10/2019    TUBAL LIGATION         Review of Systems:  Unremarkable except as mentioned above.    Current Medications:  Current Outpatient Medications   Medication Sig    aspirin (ECOTRIN) 81 MG EC tablet Take 81 mg by mouth once. "Every other day"    b complex vitamins tablet Take 1 tablet by mouth once daily.    baclofen (LIORESAL) 10 MG tablet     baclofen (LIORESAL) 20 MG tablet Take 20 mg by mouth every 8 (eight) hours as needed.    calcium carbonate 1250 MG capsule Take 1,250 mg by mouth 2 (two) times daily with meals.    ibuprofen (ADVIL,MOTRIN) 600 MG tablet Take 600 mg by mouth every 6 (six) hours as needed.    magnesium 30 mg Tab Take by mouth once.    meloxicam (MOBIC) 7.5 MG tablet     meloxicam (MOBIC) 7.5 MG tablet Take 7.5 mg by mouth.    multivitamin capsule Take 1 capsule by mouth once daily.    vitamin D (VITAMIN D3) 1000 units Tab Take 1,000 Units by mouth once daily.    zinc gluconate 50 mg tablet Take 50 mg by mouth once daily.     Current Facility-Administered Medications   Medication    sodium chloride 0.9% flush 10 mL    sodium chloride 0.9% flush 10 mL     Facility-Administered Medications Ordered in Other Visits   Medication    lactated ringers infusion        Allergies:  Review of patient's allergies indicates:  No Known Allergies     Physical Exam:  General:  Well-developed well-nourished female in no acute distress.  Voice normal  Head and face:  Normocephalic.  No facial lesions.  No temporomandibular joint tenderness or click.  Ears:  Right ear-auricle is normally developed.  External auditory canal is clear.  Tympanic membrane is nonerythematous.  No middle ear " effusion.  Left ear-auricle is normally developed.  External auditory canal is clear.  Tympanic membrane is nonerythematous.  No middle ear effusion.  Nose:  Nasal dorsum is unremarkable.  No significant septal deviation.  No significant intranasal congestion.  Secretions are clear.  Oral cavity and oropharynx:  Tongue and floor mouth are without lesions.  Mucosa is moist.  No pharyngeal erythema or exudates.  No oropharyngeal masses.  No tonsillar hypertrophy.  Neck:  Previous midline neck incision is well healed.  No palpable lymphadenopathy.  No thyromegaly.  Trachea is in the midline.  Parotid and submandibular glands are normal to palpation.    Cardiovascular: Regular rate and rhythm without murmurs.    Chest:  Clear to auscultation.    Eyes:  Extraocular muscles intact.  No nystagmus.  No exophthalmos or enophthalmos.  Neurologic:  Alert and oriented.  Cranial nerves 2-12 are grossly normal.    Pathology:        PET (4/24/2023):     FINDINGS:  Blood pool max SUV 2.5.     Background liver max SUV 3.5     Activity within the imaged brain is symmetric.  Similar symmetric tonsillar activity.     Again scattered hypermetabolic cervical lymph nodes are seen.  Reference right cervical lymph node deep to the sternocleidomastoid measures 15 mm short axis, previously 10 mm.  The max SUV is 11.0, previously 7.7.  Deauville score 4-5.  A lymph node inferior and medial to this on fused image 43 is also more FDG avid today with max SUV now 4.3.  Deauville score 4.  Max SUV of left cervical chain lymph nodes around fused image 20 is 4.5.  Deauville score 4. The reference right submandibular lymph node is not seen today.     No hypermetabolic mediastinal, hilar or axillary lymph nodes.  No suspicious pulmonary nodule.     The reference left pelvic sidewall lymph node on image 190 series 3 measures 9 mm short axis, no significant change.  The max SUV is 6.6, previously 4.1.  Deauville score 4.  Similar right external iliac  lymph node fused image 165.  Liver and spleen are not significantly enlarged.  Scattered GI activity is likely physiologic.  No inguinal adenopathy.     No suspicious osseous lesion.     Impression:     The reference right submandibular lymph node is no longer seen.  Other lymph nodes are larger or slightly more FDG avid compared to prior.  Deauville scores 4-5.          Assessment/Plan:  68-year-old female with a history of follicular non-Hodgkin's lymphoma, grade 1, stage IV s/p chemotherapy completed in 2017. Patient with FDG avid right level I LN s/p excisional lymph node biopsy on 3/17/23, final pathology was negative.  Interval PET with additional right level III FDG avid node.     Plan:  - Spoke with patient concerning risks, benefits, alternatives of right level III LN excisional biopsy; she wishes to proceed; informed consent obtained   - Patient with CT scheduled on 9/25, when we would like to schedule the surgery; patient expresses that she can essentially undergo a procedure on a Monday due to work constraints; will attempt to obtain prior auth to move scan to earlier date  - RTC 1 week after surgery       Juan Olivares MD  [employer] [facility], PGY-III

## 2023-09-15 ENCOUNTER — ANESTHESIA EVENT (OUTPATIENT)
Dept: SURGERY | Facility: HOSPITAL | Age: 68
End: 2023-09-15
Payer: MEDICARE

## 2023-09-15 NOTE — ANESTHESIA PREPROCEDURE EVALUATION
09/15/2023  Jeane Cohen is a 68 y.o., female with PMHx of CAD, Non Hodgkins lymphoma, CVA presents for excision of Rt neck mass.    COVID STATUS: NOT VACCINATED  BETA-BLOCKER: NONE    PAT NURSE PHONE INTERVIEW 9/21/23    PROBLEM LIST:  -  RIGHT NECK MASS; CERVICAL LAD      - S/P 3/17/23  Neck Anterior (Right Submental) Lymph Node, Excision:  No evidence of lymphoma histologically or immunophenotypically. Neck, Anterior (Left Submental) Lymph node, Excision: No evidence of lymphoma histologic.   -  CAD - (10/18/16 EKG=SEPTAL INFARCT); 11/16/16 CASSANDRA = ABN. (SEE BELOW)  -  Hx STAGE IV FOLLICULAR LYMPHOMA, NON-HODGKIN's (Dx 9/18/16) - CHEMO 2/13-8/21/17      - S/P 1/25/17 EX-LAP, RETROPERITONEAL MESENTERIC LN Bx, RIGHT MEDIPORT      - S/P 6/10/19 MEDIPORT REMOVAL  -  Hx 12/17/18 CVA - on ASA 81mg (PRESENTED to PCP 10/22/18 w/RIGHT SIDED HA)      - 12/17/18 MRI BRAIN = Acute nonhemorrhagic right centrum semiovale 1.1 cm lacunar  Infarct.      - 1/30/19 + BUBBLE STUDY  -  CHRONIC HOARSENESS     AM Rx DOS: NONE    ORDERS -   SURGEON: 3/17/23 EKG, CXR; 9/13/23 CBC, CMP  ANESTHESIA: NONE  CARDs ([facility]) 1/15/20 OV (SEE BELOW)  Pre-op Assessment    I have reviewed the NPO Status.      Review of Systems  Anesthesia Hx:  No problems with previous Anesthesia    Social:  Non-Smoker    Hematology/Oncology:         -- Cancer in past history: chemotherapy    Cardiovascular:   CAD asymptomatic     Pulmonary:  Pulmonary Normal    Renal/:  Renal/ Normal     Hepatic/GI:  Hepatic/GI Normal    Neurological:   CVA, no residual symptoms    Endocrine:  Endocrine Normal      Vitals:    09/25/23 0825 09/25/23 0831 09/25/23 0852   BP: (!) 150/82  (!) 150/82   Pulse: 72     Temp: 36.8 °C (98.2 °F)     TempSrc: Oral     SpO2: 99%     Weight:  71 kg (156 lb 9.6 oz)          Physical Exam  General: Alert, Cooperative and Well  nourished    Airway:  Mallampati: II   Mouth Opening: Normal  TM Distance: Normal  Tongue: Normal  Neck ROM: Normal ROM    Dental:  Intact    Chest/Lungs:  Clear to auscultation, Normal Respiratory Rate    Heart:  Rate: Normal  Rhythm: Regular Rhythm  Sounds: Normal      Lab Results   Component Value Date    WBC 4.13 (L) 09/13/2023    HGB 11.8 (L) 09/13/2023    HCT 38.4 09/13/2023    MCV 84.8 09/13/2023     09/13/2023       CMP  Sodium Level   Date Value Ref Range Status   09/13/2023 145 136 - 145 mmol/L Final     Potassium Level   Date Value Ref Range Status   09/13/2023 3.9 3.5 - 5.1 mmol/L Final     Carbon Dioxide   Date Value Ref Range Status   09/13/2023 30 23 - 31 mmol/L Final     Blood Urea Nitrogen   Date Value Ref Range Status   09/13/2023 9.1 (L) 9.8 - 20.1 mg/dL Final     Creatinine   Date Value Ref Range Status   09/13/2023 0.78 0.55 - 1.02 mg/dL Final     Calcium Level Total   Date Value Ref Range Status   09/13/2023 9.3 8.4 - 10.2 mg/dL Final     Albumin Level   Date Value Ref Range Status   09/13/2023 3.5 3.4 - 4.8 g/dL Final     Bilirubin Total   Date Value Ref Range Status   09/13/2023 0.4 <=1.5 mg/dL Final     Alkaline Phosphatase   Date Value Ref Range Status   09/13/2023 66 40 - 150 unit/L Final     Aspartate Aminotransferase   Date Value Ref Range Status   09/13/2023 17 5 - 34 unit/L Final     Alanine Aminotransferase   Date Value Ref Range Status   09/13/2023 14 0 - 55 unit/L Final     eGFR   Date Value Ref Range Status   09/13/2023 >60 mls/min/1.73/m2 Final         1/15/20 CARDs OV      3/17/23 ANESTHESIA        Anesthesia Plan  Type of Anesthesia, risks & benefits discussed:    Anesthesia Type: Gen ETT  Intra-op Monitoring Plan: Standard ASA Monitors  Post Op Pain Control Plan: IV/PO Opioids PRN  Induction:  IV  Airway Plan: Direct  Informed Consent: Informed consent signed with the Patient and all parties understand the risks and agree with anesthesia plan.  All questions answered.    ASA Score: 3  Day of Surgery Review of History & Physical: H&P Update referred to the surgeon/provider.    Ready For Surgery From Anesthesia Perspective.     .

## 2023-09-21 ENCOUNTER — HOSPITAL ENCOUNTER (OUTPATIENT)
Dept: RADIOLOGY | Facility: HOSPITAL | Age: 68
Discharge: HOME | End: 2023-09-21
Attending: INTERNAL MEDICINE
Payer: MEDICARE

## 2023-09-21 DIAGNOSIS — R59.0 CERVICAL LYMPHADENOPATHY: ICD-10-CM

## 2023-09-21 DIAGNOSIS — R59.0 PELVIC LYMPHADENOPATHY: ICD-10-CM

## 2023-09-21 DIAGNOSIS — R31.29 MICROSCOPIC HEMATURIA: ICD-10-CM

## 2023-09-21 DIAGNOSIS — C82.80 FOLLICULAR LARGE CELL NON-HODGKIN'S LYMPHOMA: ICD-10-CM

## 2023-09-21 DIAGNOSIS — E55.9 VITAMIN D DEFICIENCY: ICD-10-CM

## 2023-09-21 PROCEDURE — 70491 CT SOFT TISSUE NECK W/DYE: CPT | Mod: TC

## 2023-09-21 PROCEDURE — 25500020 PHARM REV CODE 255: Performed by: INTERNAL MEDICINE

## 2023-09-21 RX ADMIN — IOHEXOL 100 ML: 350 INJECTION, SOLUTION INTRAVENOUS at 01:09

## 2023-09-22 ENCOUNTER — PATIENT MESSAGE (OUTPATIENT)
Dept: SURGERY | Facility: HOSPITAL | Age: 68
End: 2023-09-22
Payer: MEDICARE

## 2023-09-25 ENCOUNTER — HOSPITAL ENCOUNTER (OUTPATIENT)
Dept: RADIOLOGY | Facility: HOSPITAL | Age: 68
Discharge: HOME | End: 2023-09-25
Attending: INTERNAL MEDICINE
Payer: MEDICARE

## 2023-09-25 ENCOUNTER — ANESTHESIA (OUTPATIENT)
Dept: SURGERY | Facility: HOSPITAL | Age: 68
End: 2023-09-25
Payer: MEDICARE

## 2023-09-25 ENCOUNTER — HOSPITAL ENCOUNTER (OUTPATIENT)
Facility: HOSPITAL | Age: 68
Discharge: HOME | End: 2023-09-25
Attending: OTOLARYNGOLOGY | Admitting: OTOLARYNGOLOGY
Payer: MEDICARE

## 2023-09-25 DIAGNOSIS — R31.29 MICROSCOPIC HEMATURIA: ICD-10-CM

## 2023-09-25 DIAGNOSIS — Z85.72 PERSONAL HISTORY OF LYMPHOMA: Primary | ICD-10-CM

## 2023-09-25 DIAGNOSIS — E55.9 VITAMIN D DEFICIENCY: ICD-10-CM

## 2023-09-25 DIAGNOSIS — R59.0 CERVICAL LYMPHADENOPATHY: ICD-10-CM

## 2023-09-25 DIAGNOSIS — C82.80 FOLLICULAR LARGE CELL NON-HODGKIN'S LYMPHOMA: ICD-10-CM

## 2023-09-25 DIAGNOSIS — R59.0 PELVIC LYMPHADENOPATHY: ICD-10-CM

## 2023-09-25 LAB — POCT GLUCOSE: 85 MG/DL (ref 70–110)

## 2023-09-25 PROCEDURE — 25000003 PHARM REV CODE 250

## 2023-09-25 PROCEDURE — 63600175 PHARM REV CODE 636 W HCPCS: Performed by: ANESTHESIOLOGY

## 2023-09-25 PROCEDURE — 36000706: Performed by: OTOLARYNGOLOGY

## 2023-09-25 PROCEDURE — 88271 CYTOGENETICS DNA PROBE: CPT | Mod: 59

## 2023-09-25 PROCEDURE — 71000016 HC POSTOP RECOV ADDL HR: Performed by: OTOLARYNGOLOGY

## 2023-09-25 PROCEDURE — 37000009 HC ANESTHESIA EA ADD 15 MINS: Performed by: OTOLARYNGOLOGY

## 2023-09-25 PROCEDURE — 88184 FLOWCYTOMETRY/ TC 1 MARKER: CPT | Performed by: PATHOLOGY

## 2023-09-25 PROCEDURE — D9220A PRA ANESTHESIA: ICD-10-PCS | Mod: CRNA,,, | Performed by: NURSE ANESTHETIST, CERTIFIED REGISTERED

## 2023-09-25 PROCEDURE — 25000003 PHARM REV CODE 250: Performed by: OTOLARYNGOLOGY

## 2023-09-25 PROCEDURE — 88342 IMHCHEM/IMCYTCHM 1ST ANTB: CPT

## 2023-09-25 PROCEDURE — 88185 FLOWCYTOMETRY/TC ADD-ON: CPT | Mod: 59

## 2023-09-25 PROCEDURE — 88341 IMHCHEM/IMCYTCHM EA ADD ANTB: CPT | Mod: 59

## 2023-09-25 PROCEDURE — 71260 CT THORAX DX C+: CPT | Mod: TC

## 2023-09-25 PROCEDURE — 36000707: Performed by: OTOLARYNGOLOGY

## 2023-09-25 PROCEDURE — 25500020 PHARM REV CODE 255: Performed by: INTERNAL MEDICINE

## 2023-09-25 PROCEDURE — D9220A PRA ANESTHESIA: ICD-10-PCS | Mod: ANES,,, | Performed by: ANESTHESIOLOGY

## 2023-09-25 PROCEDURE — D9220A PRA ANESTHESIA: Mod: ANES,,, | Performed by: ANESTHESIOLOGY

## 2023-09-25 PROCEDURE — 74177 CT ABD & PELVIS W/CONTRAST: CPT | Mod: TC

## 2023-09-25 PROCEDURE — 88305 TISSUE EXAM BY PATHOLOGIST: CPT | Mod: TC,59 | Performed by: OTOLARYNGOLOGY

## 2023-09-25 PROCEDURE — 37000008 HC ANESTHESIA 1ST 15 MINUTES: Performed by: OTOLARYNGOLOGY

## 2023-09-25 PROCEDURE — 71000015 HC POSTOP RECOV 1ST HR: Performed by: OTOLARYNGOLOGY

## 2023-09-25 PROCEDURE — 88237 TISSUE CULTURE BONE MARROW: CPT

## 2023-09-25 PROCEDURE — 63600175 PHARM REV CODE 636 W HCPCS: Performed by: STUDENT IN AN ORGANIZED HEALTH CARE EDUCATION/TRAINING PROGRAM

## 2023-09-25 PROCEDURE — 25000003 PHARM REV CODE 250: Performed by: NURSE ANESTHETIST, CERTIFIED REGISTERED

## 2023-09-25 PROCEDURE — 63600175 PHARM REV CODE 636 W HCPCS: Performed by: NURSE ANESTHETIST, CERTIFIED REGISTERED

## 2023-09-25 PROCEDURE — 88325 CONSLTJ COMPRE RVW REC REPRT: CPT | Performed by: PATHOLOGY

## 2023-09-25 PROCEDURE — 71000033 HC RECOVERY, INTIAL HOUR: Performed by: OTOLARYNGOLOGY

## 2023-09-25 PROCEDURE — D9220A PRA ANESTHESIA: Mod: CRNA,,, | Performed by: NURSE ANESTHETIST, CERTIFIED REGISTERED

## 2023-09-25 PROCEDURE — 88275 CYTOGENETICS 100-300: CPT | Performed by: PATHOLOGY

## 2023-09-25 RX ORDER — DEXAMETHASONE SODIUM PHOSPHATE 4 MG/ML
INJECTION, SOLUTION INTRA-ARTICULAR; INTRALESIONAL; INTRAMUSCULAR; INTRAVENOUS; SOFT TISSUE
Status: DISCONTINUED | OUTPATIENT
Start: 2023-09-25 | End: 2023-09-25

## 2023-09-25 RX ORDER — PROPOFOL 10 MG/ML
VIAL (ML) INTRAVENOUS
Status: DISCONTINUED | OUTPATIENT
Start: 2023-09-25 | End: 2023-09-25

## 2023-09-25 RX ORDER — GLYCOPYRROLATE 0.2 MG/ML
INJECTION INTRAMUSCULAR; INTRAVENOUS
Status: DISCONTINUED | OUTPATIENT
Start: 2023-09-25 | End: 2023-09-25

## 2023-09-25 RX ORDER — PHENYLEPHRINE HYDROCHLORIDE 10 MG/ML
INJECTION INTRAVENOUS
Status: DISCONTINUED | OUTPATIENT
Start: 2023-09-25 | End: 2023-09-25

## 2023-09-25 RX ORDER — OXYCODONE HYDROCHLORIDE 5 MG/1
5 TABLET ORAL
Status: DISCONTINUED | OUTPATIENT
Start: 2023-09-25 | End: 2023-09-25 | Stop reason: HOSPADM

## 2023-09-25 RX ORDER — MEPERIDINE HYDROCHLORIDE 25 MG/ML
12.5 INJECTION INTRAMUSCULAR; INTRAVENOUS; SUBCUTANEOUS EVERY 10 MIN PRN
Status: DISCONTINUED | OUTPATIENT
Start: 2023-09-25 | End: 2023-09-25 | Stop reason: HOSPADM

## 2023-09-25 RX ORDER — CEFAZOLIN SODIUM 2 G/50ML
2 SOLUTION INTRAVENOUS
Status: COMPLETED | OUTPATIENT
Start: 2023-09-25 | End: 2023-09-25

## 2023-09-25 RX ORDER — LIDOCAINE HYDROCHLORIDE 10 MG/ML
1 INJECTION, SOLUTION EPIDURAL; INFILTRATION; INTRACAUDAL; PERINEURAL ONCE
Status: ACTIVE | OUTPATIENT
Start: 2023-09-25

## 2023-09-25 RX ORDER — LIDOCAINE HYDROCHLORIDE 40 MG/ML
SOLUTION TOPICAL
Status: DISCONTINUED | OUTPATIENT
Start: 2023-09-25 | End: 2023-09-25

## 2023-09-25 RX ORDER — MORPHINE SULFATE 2 MG/ML
2 INJECTION, SOLUTION INTRAMUSCULAR; INTRAVENOUS EVERY 5 MIN PRN
Status: DISCONTINUED | OUTPATIENT
Start: 2023-09-25 | End: 2023-09-25 | Stop reason: HOSPADM

## 2023-09-25 RX ORDER — BACITRACIN 500 [USP'U]/G
OINTMENT TOPICAL
Status: DISCONTINUED | OUTPATIENT
Start: 2023-09-25 | End: 2023-09-25 | Stop reason: HOSPADM

## 2023-09-25 RX ORDER — LIDOCAINE HYDROCHLORIDE 10 MG/ML
1 INJECTION, SOLUTION EPIDURAL; INFILTRATION; INTRACAUDAL; PERINEURAL ONCE
Status: DISCONTINUED | OUTPATIENT
Start: 2023-09-25 | End: 2023-09-25 | Stop reason: HOSPADM

## 2023-09-25 RX ORDER — LIDOCAINE HYDROCHLORIDE 20 MG/ML
INJECTION INTRAVENOUS
Status: DISCONTINUED | OUTPATIENT
Start: 2023-09-25 | End: 2023-09-25

## 2023-09-25 RX ORDER — SODIUM CHLORIDE, SODIUM LACTATE, POTASSIUM CHLORIDE, CALCIUM CHLORIDE 600; 310; 30; 20 MG/100ML; MG/100ML; MG/100ML; MG/100ML
INJECTION, SOLUTION INTRAVENOUS CONTINUOUS
Status: ACTIVE | OUTPATIENT
Start: 2023-09-25

## 2023-09-25 RX ORDER — HYDROCODONE BITARTRATE AND ACETAMINOPHEN 5; 325 MG/1; MG/1
1 TABLET ORAL EVERY 6 HOURS PRN
Qty: 8 TABLET | Refills: 0 | Status: SHIPPED | OUTPATIENT
Start: 2023-09-25 | End: 2024-01-22

## 2023-09-25 RX ORDER — LIDOCAINE HYDROCHLORIDE AND EPINEPHRINE 10; 10 MG/ML; UG/ML
INJECTION, SOLUTION INFILTRATION; PERINEURAL
Status: DISCONTINUED | OUTPATIENT
Start: 2023-09-25 | End: 2023-09-25 | Stop reason: HOSPADM

## 2023-09-25 RX ORDER — MIDAZOLAM HYDROCHLORIDE 1 MG/ML
2 INJECTION INTRAMUSCULAR; INTRAVENOUS ONCE AS NEEDED
Status: COMPLETED | OUTPATIENT
Start: 2023-09-25 | End: 2023-09-25

## 2023-09-25 RX ORDER — FENTANYL CITRATE 50 UG/ML
INJECTION, SOLUTION INTRAMUSCULAR; INTRAVENOUS
Status: DISCONTINUED | OUTPATIENT
Start: 2023-09-25 | End: 2023-09-25

## 2023-09-25 RX ORDER — ONDANSETRON 2 MG/ML
4 INJECTION INTRAMUSCULAR; INTRAVENOUS DAILY PRN
Status: DISCONTINUED | OUTPATIENT
Start: 2023-09-25 | End: 2023-09-25 | Stop reason: HOSPADM

## 2023-09-25 RX ORDER — ONDANSETRON 2 MG/ML
INJECTION INTRAMUSCULAR; INTRAVENOUS
Status: DISCONTINUED | OUTPATIENT
Start: 2023-09-25 | End: 2023-09-25

## 2023-09-25 RX ORDER — SODIUM CHLORIDE 0.9 % (FLUSH) 0.9 %
10 SYRINGE (ML) INJECTION
Status: DISCONTINUED | OUTPATIENT
Start: 2023-09-25 | End: 2023-09-25 | Stop reason: HOSPADM

## 2023-09-25 RX ORDER — ROCURONIUM BROMIDE 10 MG/ML
INJECTION, SOLUTION INTRAVENOUS
Status: DISCONTINUED | OUTPATIENT
Start: 2023-09-25 | End: 2023-09-25

## 2023-09-25 RX ORDER — SUCCINYLCHOLINE CHLORIDE 20 MG/ML
INJECTION INTRAMUSCULAR; INTRAVENOUS
Status: DISCONTINUED | OUTPATIENT
Start: 2023-09-25 | End: 2023-09-25

## 2023-09-25 RX ADMIN — MIDAZOLAM HYDROCHLORIDE 2 MG: 1 INJECTION, SOLUTION INTRAMUSCULAR; INTRAVENOUS at 11:09

## 2023-09-25 RX ADMIN — PHENYLEPHRINE HYDROCHLORIDE 100 MCG: 10 INJECTION INTRAVENOUS at 11:09

## 2023-09-25 RX ADMIN — LIDOCAINE HYDROCHLORIDE 50 MG: 20 INJECTION INTRAVENOUS at 12:09

## 2023-09-25 RX ADMIN — ROCURONIUM BROMIDE 5 MG: 10 INJECTION INTRAVENOUS at 11:09

## 2023-09-25 RX ADMIN — SUCCINYLCHOLINE CHLORIDE 100 MG: 20 INJECTION, SOLUTION INTRAMUSCULAR; INTRAVENOUS at 11:09

## 2023-09-25 RX ADMIN — PHENYLEPHRINE HYDROCHLORIDE 100 MCG: 10 INJECTION INTRAVENOUS at 12:09

## 2023-09-25 RX ADMIN — IOHEXOL 100 ML: 350 INJECTION, SOLUTION INTRAVENOUS at 03:09

## 2023-09-25 RX ADMIN — SODIUM CHLORIDE, POTASSIUM CHLORIDE, SODIUM LACTATE AND CALCIUM CHLORIDE: 600; 310; 30; 20 INJECTION, SOLUTION INTRAVENOUS at 11:09

## 2023-09-25 RX ADMIN — FENTANYL CITRATE 50 MCG: 50 INJECTION INTRAMUSCULAR; INTRAVENOUS at 12:09

## 2023-09-25 RX ADMIN — LIDOCAINE HYDROCHLORIDE 100 MG: 20 INJECTION INTRAVENOUS at 11:09

## 2023-09-25 RX ADMIN — GLYCOPYRROLATE 0.1 MG: 0.2 INJECTION INTRAMUSCULAR; INTRAVENOUS at 12:09

## 2023-09-25 RX ADMIN — CEFAZOLIN SODIUM 2 G: 2 SOLUTION INTRAVENOUS at 11:09

## 2023-09-25 RX ADMIN — LIDOCAINE HYDROCHLORIDE 160 MG: 40 SOLUTION TOPICAL at 11:09

## 2023-09-25 RX ADMIN — FENTANYL CITRATE 50 MCG: 50 INJECTION INTRAMUSCULAR; INTRAVENOUS at 11:09

## 2023-09-25 RX ADMIN — PROPOFOL 100 MG: 10 INJECTION, EMULSION INTRAVENOUS at 11:09

## 2023-09-25 RX ADMIN — ONDANSETRON 4 MG: 2 INJECTION INTRAMUSCULAR; INTRAVENOUS at 12:09

## 2023-09-25 RX ADMIN — DEXAMETHASONE SODIUM PHOSPHATE 8 MG: 4 INJECTION, SOLUTION INTRA-ARTICULAR; INTRALESIONAL; INTRAMUSCULAR; INTRAVENOUS; SOFT TISSUE at 11:09

## 2023-09-25 NOTE — OP NOTE
Otolaryngology Operative Note    Date of procedure: 09/25/2023    Attending Surgeon: Jaron Son MD    Resident Surgeon: Kath Abdullahi PGY IV    Pre-operative diagnosis:  1. Cervical adenopathy  2. History of Non-Hodgkin's lymphoma     Post-operative diagnosis:   1. Same    Procedure:  1. Excisional lymph node biopsy    Anesthesia: General    Complications: None    Specimens:   ID Type Source Tests Collected by Time Destination   A : RIGHT CERVICAL LYMPH NODE, #1 Tissue Neck, Anterior SPECIMEN TO PATHOLOGY Caren Delgadillo RN 9/25/2023 1158    B : RIGHT CERVICAL LYMPH NODE #2 Tissue Neck, Anterior SPECIMEN TO PATHOLOGY Caren Delgadillo RN 9/25/2023 1213        Blood loss: 15 mL    Indication:  Jeane Cohen is a 68 y.o. female with a history of follicular non-Hodgkin's lymphoma, grade 1, stage IV s/p chemotherapy completed in 2017. Patient with FDG avid right level I LN s/p excisional lymph node biopsy on 3/17/23, final pathology was negative.  Interval PET with additional right level III FDG avid node.  Patient was offered an excisional lymph node biopsy. After risks, benefits and alternatives were discussed patient was scheduled for this on 9/25/2023 at [facility].     Procedure in detail:  The patient was brought to the operating theater and placed supine on the operating table.  General endotracheal anesthesia was induced.  The right neck was prepped and draped in the usual sterile fashion.  Timeout was performed.  Perioperative antibiotics were administered.     With a 15 blade scalpel a 4 cm incision was made in a preexisting skin crease. The external jugular vein was identified, ligated, and tied with 2-0 silk. Subplatysmal flaps were raised. A small approximately 1 cm firm lymph node was identified. It dissected free of surrounding attachments and passed off the field as specimen. A larger approximately 2 cm firm lymph node was identified deeper in the neck overlying  the internal jugular vein. This was dissected free of surrounding tissue and passed off the field as specimen. A third approximately 1.5 cm firm lymph node was identified adjacent to the second node overlying the vein. It was also dissected free and passed off as specimen. Hemostasis achieved with bipolar cautery. The wound was copiously irrigated with normal saline solution. Platysmal layer was closed with 3-0 vicryl ties. The skin was reapproximated using 5-0 plain gut.     The patient tolerated the procedure well and without complications.  The patient's care was delivered into the hands of the anesthesia team thereafter.  All counts were correct at the end of the procedure.    Dr. Son was present and scrubbed for the entire procedure.    9/25/2023  12:40 PM    Kath Abdullahi MD  [facility] Otolaryngology, PGY IV

## 2023-09-25 NOTE — DISCHARGE INSTRUCTIONS
SKIN LESION REMOVAL    · Keep follow up appointment at the [facility] on ___October 4 2023 at 1:45    · You may take a shower tomorrow. You may let water run over incision do not scrub or peel.    · Do not soak your wound in water until cleared by MD. Do not take baths, swim, or use a hot tub until your doctor says it is okay.    · Use pain medication as instructed. Do not take Tylenol (acetaminophen) with your Norco since Norco contains Tylenol as well. If you are experiencing severe pain even with your pain medications, please call the ENT clinic at [phone] to notify your doctor.    · You may use an ice pack as needed for 20 minutes at a time over your incision site to minimize swelling and help relieve pain.    · Do not drink alcohol or drive today, or as long as you are on pain medication.    · Notify MD of any moderate to severe pain unrelieved by pain medicine, if your incision opens and/or bleeds, or for any signs of infection including fever above 100.4, excessive redness or swelling, yellow/green foul- smelling drainage, nausea or vomiting. Clinics number is [phone]. If it is after business hours or emergency call [phone] and state Im having post op complications and need to speak to the ENT surgeon on call.    Thanks for choosing [facility]! Have a smooth recovery!

## 2023-09-25 NOTE — ANESTHESIA POSTPROCEDURE EVALUATION
Anesthesia Post Evaluation    Patient: Jeane Cohen    Procedure(s) Performed: Procedure(s) (LRB):  EXCISION, MASS, NECK (Right)    Final Anesthesia Type: general      Patient location during evaluation: DOSC  Post-procedure vital signs: reviewed and stable  Airway patency: patent      Anesthetic complications: no      Cardiovascular status: hemodynamically stable  Respiratory status: spontaneous ventilation  Follow-up not needed.          Vitals Value Taken Time   /78 09/25/23 1430   Temp 36.2 °C (97.2 °F) 09/25/23 1315   Pulse 61 09/25/23 1453   Resp 20 09/25/23 1325   SpO2 96 % 09/25/23 1453   Vitals shown include unvalidated device data.      Event Time   Out of Recovery 13:20:00         Pain/Gary Score: Gary Score: 10 (9/25/2023  1:22 PM)

## 2023-09-25 NOTE — INTERVAL H&P NOTE
The patient has been examined and the H&P has been reviewed:    I concur with the findings and no changes have occurred since H&P was written.    Surgery risks, benefits and alternative options discussed and understood by patient/family.      Juan Olivares MD  [employer] [facility], PGY-III

## 2023-09-25 NOTE — ANESTHESIA PROCEDURE NOTES
Intubation    Date/Time: 9/25/2023 11:36 AM    Performed by: Azael Vazquez CRNA  Authorized by: Caitlin Pfeiffer MD    Intubation:     Induction:  Intravenous    Intubated:  Postinduction    Mask Ventilation:  Easy mask    Attempts:  1    Attempted By:  CRNA    Method of Intubation:  Direct    Blade:  Jurado 3    Laryngeal View Grade: Grade I - full view of cords      Difficult Airway Encountered?: No      Complications:  None    Airway Device:  Oral endotracheal tube    Airway Device Size:  7.5    Style/Cuff Inflation:  Cuffed (inflated to minimal occlusive pressure)    Tube secured:  22    Secured at:  The lips    Placement Verified By:  Capnometry    Complicating Factors:  None    Findings Post-Intubation:  BS equal bilateral and atraumatic/condition of teeth unchanged

## 2023-09-25 NOTE — LETTER
September 25, 2023         [address] [facility] [address]  Phone: [phone]  Fax: [phone]       Patient: Jeane Cohen  YOB: 1955  Date of Visit: 09/25/2023    To Whom It May Concern:    Jeane Cohen was at [facility]  on 09/25/2023. She may return to work/school on 9/26/2023 with restrictions--- no lifting over 10 pounds for 1 week. If you have any questions or concerns, or if I can be of further assistance, please do not hesitate to contact me.  Sincerely,    Shaina Harkins RN

## 2023-09-25 NOTE — TRANSFER OF CARE
Anesthesia Transfer of Care Note    Patient: Jeane Cohen    Procedure(s) Performed: Procedure(s) (LRB):  EXCISION, MASS, NECK (Right)    Patient location: PACU    Anesthesia Type: general    Transport from OR: Transported from OR on room air with adequate spontaneous ventilation    Post pain: adequate analgesia    Post assessment: no apparent anesthetic complications and tolerated procedure well    Post vital signs: stable    Level of consciousness: sedated    Nausea/Vomiting: no nausea/vomiting    Complications: none    Transfer of care protocol was followedComments: Report to Enrique      Last vitals:   Visit Vitals  BP (!) 140/75   Pulse 78   Temp 36 °C (96.8 °F) (Temporal)   Resp 20   Wt 71 kg (156 lb 9.6 oz)   SpO2 100%   Breastfeeding No   BMI 26.87 kg/m²

## 2023-09-25 NOTE — DISCHARGE SUMMARY
[facility]iop [facility]  Discharge Note  Short Stay    Procedure(s) (LRB):  EXCISION, MASS, NECK (Right)      OUTCOME: Patient tolerated treatment/procedure well without complication and is now ready for discharge.    DISPOSITION: Home or Self Care    FINAL DIAGNOSIS:  Cervical adenopathy    FOLLOWUP: In clinic    DISCHARGE INSTRUCTIONS:    Discharge Procedure Orders   Diet Adult Regular     Notify your health care provider if you experience any of the following:  temperature >100.4     Notify your health care provider if you experience any of the following:  redness, tenderness, or signs of infection (pain, swelling, redness, odor or green/yellow discharge around incision site)     No dressing needed   Order Comments: Wound care instructions:   - Clean incision with q-tip dipped in half strength peroxide TID.   - After cleaning, apply ointment.   - Can switch to aquaphor ointment after 5 days.     Activity as tolerated        TIME SPENT ON DISCHARGE: 15 minutes

## 2023-09-29 VITALS
TEMPERATURE: 97 F | OXYGEN SATURATION: 95 % | HEART RATE: 51 BPM | RESPIRATION RATE: 20 BRPM | SYSTOLIC BLOOD PRESSURE: 146 MMHG | BODY MASS INDEX: 26.87 KG/M2 | DIASTOLIC BLOOD PRESSURE: 78 MMHG | WEIGHT: 156.63 LBS

## 2023-10-04 ENCOUNTER — OFFICE VISIT (OUTPATIENT)
Dept: OTOLARYNGOLOGY | Facility: CLINIC | Age: 68
End: 2023-10-04
Payer: MEDICARE

## 2023-10-04 VITALS
OXYGEN SATURATION: 100 % | DIASTOLIC BLOOD PRESSURE: 83 MMHG | HEART RATE: 67 BPM | TEMPERATURE: 98 F | SYSTOLIC BLOOD PRESSURE: 135 MMHG | RESPIRATION RATE: 18 BRPM | BODY MASS INDEX: 27.18 KG/M2 | HEIGHT: 64 IN | WEIGHT: 159.19 LBS

## 2023-10-04 DIAGNOSIS — R59.0 CERVICAL LYMPHADENOPATHY: ICD-10-CM

## 2023-10-04 DIAGNOSIS — R22.1 NECK MASS: Primary | ICD-10-CM

## 2023-10-04 LAB
BEAKER SEE SCANNED REPORT: NORMAL

## 2023-10-04 PROCEDURE — 99214 OFFICE O/P EST MOD 30 MIN: CPT | Mod: PBBFAC | Performed by: STUDENT IN AN ORGANIZED HEALTH CARE EDUCATION/TRAINING PROGRAM

## 2023-10-04 NOTE — PROGRESS NOTES
Patient Name: Jeane Cohen   YOB: 1955     Chief Complaint:  Cervical lymphadenopathy       History of Present Illness:  68-year-old female with a history of follicular non-Hodgkin's lymphoma, grade 1, stage IV which was initially diagnosed in January of 2017, which was treated with bendamustine/Rituxan x6 (02/13/2017-08/21/2017) referred for evaluation of cervical lymphadenopathy.  The patient had been without evidence of recurrent disease on surveillance CT scans done up until February 24, 2022.  On the CT scan done on February 24, 2022 patient was noted to have some submental lymph nodes which were felt to be nonspecific which showed a slight increase in size.  On follow-up CT scan done on January 27, 2023, was noted that there was some interval enlargement of these lymph nodes as well as a another lymph node in the left posterior cervical triangle.  PET-CT scan done on March 9, 2023 did show presence of hypermetabolic cervical lymphadenopathy and also left pelvic lymphadenopathy.  PET-CT scan showed the presence of FDG avid lymphadenopathy in the right submental region where there appeared to be a conglomeration of at least 3 lymph nodes and also an FDG avid cervical lymph node in posterior cervical triangle.      She is s/p excisional lymph node biopsy of 3/17/23.  She is doing well at this time.  No complaints.  Pain is well controlled.  No erythema, swelling or drainage from incision.      09/13/2023: Returns in follow-up.  She has undergone a repeat PET in the interim and has followed up with Dr. Mace over concern for lymphoma recurrence.  She denies any unintentional weight loss, night sweats, or new lumps or bumps in her neck.    10/04/2023: Patient here today for follow up postoperatively.  She is healing well.  Her final pathology is still pending due to additional consultation.  She will continue to follow up with Dr. Mace.     Past Medical History:  Past Medical History:  "  Diagnosis Date    Chronic hoarseness     Large cell, follicular non-Hodgkin's lymphoma     Microscopic hematuria     Prediabetes     Vitamin D deficiency      Past Surgical History:   Procedure Laterality Date    catheter insertion mediport  01/25/2017    COLONOSCOPY  09/01/2017    HYSTERECTOMY  2002    Laparoscopy exploratory  01/25/2017    NECK MASS EXCISION Bilateral 3/17/2023    Procedure: EXCISION, MASS, NECK;  Surgeon: Jaron Son MD;  Location: [facility] OR;  Service: ENT;  Laterality: Bilateral;    NECK MASS EXCISION Right 9/25/2023    Procedure: EXCISION, MASS, NECK;  Surgeon: Jaron Son MD;  Location: [facility] OR;  Service: ENT;  Laterality: Right;    removal of mediport  06/10/2019    TUBAL LIGATION         Review of Systems:  Unremarkable except as mentioned above.    Current Medications:  Current Outpatient Medications   Medication Sig    aspirin (ECOTRIN) 81 MG EC tablet Take 81 mg by mouth once. "Every other day"    b complex vitamins tablet Take 1 tablet by mouth once daily.    calcium carbonate 1250 MG capsule Take 1,250 mg by mouth 2 (two) times daily with meals.    HYDROcodone-acetaminophen (NORCO) 5-325 mg per tablet Take 1 tablet by mouth every 6 (six) hours as needed for Pain.    magnesium 30 mg Tab Take by mouth once.    multivitamin capsule Take 1 capsule by mouth once daily.    vitamin D (VITAMIN D3) 1000 units Tab Take 1,000 Units by mouth once daily.    zinc gluconate 50 mg tablet Take 50 mg by mouth once daily.     Current Facility-Administered Medications   Medication    sodium chloride 0.9% flush 10 mL    sodium chloride 0.9% flush 10 mL     Facility-Administered Medications Ordered in Other Visits   Medication    lactated ringers infusion    lactated ringers infusion    LIDOcaine (PF) 10 mg/ml (1%) injection 10 mg        Allergies:  Review of patient's allergies indicates:  No Known Allergies     Physical Exam:  General:  Well-developed well-nourished female in no acute " distress.  Voice normal  Head and face:  Normocephalic.  No facial lesions.  No temporomandibular joint tenderness or click.  Ears:  Right ear-auricle is normally developed.  External auditory canal is clear.  Tympanic membrane is nonerythematous.  No middle ear effusion.  Left ear-auricle is normally developed.  External auditory canal is clear.  Tympanic membrane is nonerythematous.  No middle ear effusion.  Nose:  Nasal dorsum is unremarkable.  No significant septal deviation.  No significant intranasal congestion.  Secretions are clear.  Oral cavity and oropharynx:  Tongue and floor mouth are without lesions.  Mucosa is moist.  No pharyngeal erythema or exudates.  No oropharyngeal masses.  No tonsillar hypertrophy.  Neck:  Previous midline neck incision is well healed.  No palpable lymphadenopathy.  No thyromegaly.  Trachea is in the midline.  Parotid and submandibular glands are normal to palpation.    Incision healing well, cdi  Cardiovascular: Regular rate and rhythm without murmurs.    Chest:  Clear to auscultation.    Eyes:  Extraocular muscles intact.  No nystagmus.  No exophthalmos or enophthalmos.  Neurologic:  Alert and oriented.  Cranial nerves 2-12 are grossly normal.    Pathology:        PET (4/24/2023):     FINDINGS:  Blood pool max SUV 2.5.     Background liver max SUV 3.5     Activity within the imaged brain is symmetric.  Similar symmetric tonsillar activity.     Again scattered hypermetabolic cervical lymph nodes are seen.  Reference right cervical lymph node deep to the sternocleidomastoid measures 15 mm short axis, previously 10 mm.  The max SUV is 11.0, previously 7.7.  Deauville score 4-5.  A lymph node inferior and medial to this on fused image 43 is also more FDG avid today with max SUV now 4.3.  Deauville score 4.  Max SUV of left cervical chain lymph nodes around fused image 20 is 4.5.  Deauville score 4. The reference right submandibular lymph node is not seen today.     No  hypermetabolic mediastinal, hilar or axillary lymph nodes.  No suspicious pulmonary nodule.     The reference left pelvic sidewall lymph node on image 190 series 3 measures 9 mm short axis, no significant change.  The max SUV is 6.6, previously 4.1.  Deauville score 4.  Similar right external iliac lymph node fused image 165.  Liver and spleen are not significantly enlarged.  Scattered GI activity is likely physiologic.  No inguinal adenopathy.     No suspicious osseous lesion.     Impression:     The reference right submandibular lymph node is no longer seen.  Other lymph nodes are larger or slightly more FDG avid compared to prior.  Deauville scores 4-5.      Final pathology pending    Assessment/Plan:  68-year-old female with a history of follicular non-Hodgkin's lymphoma, grade 1, stage IV s/p chemotherapy completed in 2017. Patient with FDG avid right level I LN s/p excisional lymph node biopsy on 3/17/23, final pathology was negative.  Interval PET with additional right level III FDG avid node.     Plan:  - routine wound care  - follow up with Dr. Mace  - I will follow up final pathology and if needed Ill have her come back to our clinic.   - RTC PRN      TREE Brunner MD  [facility] Otolaryngology PGY IV
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
## 2023-10-16 ENCOUNTER — OFFICE VISIT (OUTPATIENT)
Dept: HEMATOLOGY/ONCOLOGY | Facility: CLINIC | Age: 68
End: 2023-10-16
Attending: INTERNAL MEDICINE
Payer: MEDICARE

## 2023-10-16 VITALS
OXYGEN SATURATION: 100 % | TEMPERATURE: 98 F | HEART RATE: 73 BPM | DIASTOLIC BLOOD PRESSURE: 79 MMHG | SYSTOLIC BLOOD PRESSURE: 146 MMHG | WEIGHT: 158.81 LBS | RESPIRATION RATE: 20 BRPM | BODY MASS INDEX: 27.11 KG/M2 | HEIGHT: 64 IN

## 2023-10-16 DIAGNOSIS — R94.8 ABNORMAL POSITRON EMISSION TOMOGRAPHY (PET) SCAN: ICD-10-CM

## 2023-10-16 DIAGNOSIS — R59.0 CERVICAL LYMPHADENOPATHY: ICD-10-CM

## 2023-10-16 DIAGNOSIS — K63.5 POLYP OF COLON, UNSPECIFIED PART OF COLON, UNSPECIFIED TYPE: ICD-10-CM

## 2023-10-16 DIAGNOSIS — C82.80 FOLLICULAR LARGE CELL NON-HODGKIN'S LYMPHOMA: Primary | ICD-10-CM

## 2023-10-16 DIAGNOSIS — Z85.72 HISTORY OF FOLLICULAR LYMPHOMA: ICD-10-CM

## 2023-10-16 DIAGNOSIS — R59.0 PELVIC LYMPHADENOPATHY: ICD-10-CM

## 2023-10-16 PROCEDURE — 99214 OFFICE O/P EST MOD 30 MIN: CPT | Mod: S$PBB,,, | Performed by: INTERNAL MEDICINE

## 2023-10-16 PROCEDURE — 1159F MED LIST DOCD IN RCRD: CPT | Mod: CPTII,,, | Performed by: INTERNAL MEDICINE

## 2023-10-16 PROCEDURE — 1126F AMNT PAIN NOTED NONE PRSNT: CPT | Mod: CPTII,,, | Performed by: INTERNAL MEDICINE

## 2023-10-16 PROCEDURE — 3008F PR BODY MASS INDEX (BMI) DOCUMENTED: ICD-10-PCS | Mod: CPTII,,, | Performed by: INTERNAL MEDICINE

## 2023-10-16 PROCEDURE — 1159F PR MEDICATION LIST DOCUMENTED IN MEDICAL RECORD: ICD-10-PCS | Mod: CPTII,,, | Performed by: INTERNAL MEDICINE

## 2023-10-16 PROCEDURE — 99214 OFFICE O/P EST MOD 30 MIN: CPT | Mod: PBBFAC | Performed by: INTERNAL MEDICINE

## 2023-10-16 PROCEDURE — 99214 PR OFFICE/OUTPT VISIT, EST, LEVL IV, 30-39 MIN: ICD-10-PCS | Mod: S$PBB,,, | Performed by: INTERNAL MEDICINE

## 2023-10-16 PROCEDURE — 3078F PR MOST RECENT DIASTOLIC BLOOD PRESSURE < 80 MM HG: ICD-10-PCS | Mod: CPTII,,, | Performed by: INTERNAL MEDICINE

## 2023-10-16 PROCEDURE — 3008F BODY MASS INDEX DOCD: CPT | Mod: CPTII,,, | Performed by: INTERNAL MEDICINE

## 2023-10-16 PROCEDURE — 3044F HG A1C LEVEL LT 7.0%: CPT | Mod: CPTII,,, | Performed by: INTERNAL MEDICINE

## 2023-10-16 PROCEDURE — 3077F SYST BP >= 140 MM HG: CPT | Mod: CPTII,,, | Performed by: INTERNAL MEDICINE

## 2023-10-16 PROCEDURE — 3288F PR FALLS RISK ASSESSMENT DOCUMENTED: ICD-10-PCS | Mod: CPTII,,, | Performed by: INTERNAL MEDICINE

## 2023-10-16 PROCEDURE — 3078F DIAST BP <80 MM HG: CPT | Mod: CPTII,,, | Performed by: INTERNAL MEDICINE

## 2023-10-16 PROCEDURE — 1160F PR REVIEW ALL MEDS BY PRESCRIBER/CLIN PHARMACIST DOCUMENTED: ICD-10-PCS | Mod: CPTII,,, | Performed by: INTERNAL MEDICINE

## 2023-10-16 PROCEDURE — 1101F PR PT FALLS ASSESS DOC 0-1 FALLS W/OUT INJ PAST YR: ICD-10-PCS | Mod: CPTII,,, | Performed by: INTERNAL MEDICINE

## 2023-10-16 PROCEDURE — 3288F FALL RISK ASSESSMENT DOCD: CPT | Mod: CPTII,,, | Performed by: INTERNAL MEDICINE

## 2023-10-16 PROCEDURE — 3044F PR MOST RECENT HEMOGLOBIN A1C LEVEL <7.0%: ICD-10-PCS | Mod: CPTII,,, | Performed by: INTERNAL MEDICINE

## 2023-10-16 PROCEDURE — 1126F PR PAIN SEVERITY QUANTIFIED, NO PAIN PRESENT: ICD-10-PCS | Mod: CPTII,,, | Performed by: INTERNAL MEDICINE

## 2023-10-16 PROCEDURE — 1160F RVW MEDS BY RX/DR IN RCRD: CPT | Mod: CPTII,,, | Performed by: INTERNAL MEDICINE

## 2023-10-16 PROCEDURE — 3077F PR MOST RECENT SYSTOLIC BLOOD PRESSURE >= 140 MM HG: ICD-10-PCS | Mod: CPTII,,, | Performed by: INTERNAL MEDICINE

## 2023-10-16 PROCEDURE — 1101F PT FALLS ASSESS-DOCD LE1/YR: CPT | Mod: CPTII,,, | Performed by: INTERNAL MEDICINE

## 2023-10-16 RX ORDER — FLUCONAZOLE 150 MG/1
150 TABLET ORAL
COMMUNITY
Start: 2023-07-07 | End: 2024-01-22

## 2023-10-16 RX ORDER — ONDANSETRON 4 MG/1
TABLET, FILM COATED ORAL
COMMUNITY
End: 2024-01-22

## 2023-10-16 RX ORDER — AMOXICILLIN 875 MG/1
875 TABLET, FILM COATED ORAL EVERY 12 HOURS
COMMUNITY
Start: 2023-07-07 | End: 2024-01-29

## 2023-10-16 RX ORDER — HYDROCODONE BITARTRATE AND ACETAMINOPHEN 5; 325 MG/1; MG/1
TABLET ORAL
COMMUNITY
End: 2024-01-22

## 2023-10-16 RX ORDER — FLUTICASONE PROPIONATE 50 MCG
1 SPRAY, SUSPENSION (ML) NASAL 2 TIMES DAILY
COMMUNITY
Start: 2023-08-25 | End: 2024-01-22

## 2023-10-16 RX ORDER — CETIRIZINE HYDROCHLORIDE 10 MG/1
10 TABLET ORAL
COMMUNITY
Start: 2023-08-25 | End: 2024-01-22

## 2023-10-16 NOTE — Clinical Note
Need the pathology report from recent lymph node biopsy [facility] Mammogram in February 2024 Follow-up in 3 weeks

## 2023-10-23 LAB
DHEA SERPL-MCNC: NORMAL
ESTROGEN SERPL-MCNC: NORMAL PG/ML
INSULIN SERPL-ACNC: NORMAL U[IU]/ML
LAB AP CLINICAL INFORMATION: NORMAL
LAB AP GROSS DESCRIPTION: NORMAL
LAB AP REPORT FOOTNOTES: NORMAL
T3RU NFR SERPL: NORMAL %

## 2023-10-26 ENCOUNTER — DOCUMENTATION ONLY (OUTPATIENT)
Dept: HEMATOLOGY/ONCOLOGY | Facility: CLINIC | Age: 68
End: 2023-10-26
Payer: MEDICARE

## 2023-11-02 LAB — BEAKER SEE SCANNED REPORT: NORMAL
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
## 2023-12-12 ENCOUNTER — OFFICE VISIT (OUTPATIENT)
Dept: HEMATOLOGY/ONCOLOGY | Facility: CLINIC | Age: 68
End: 2023-12-12
Attending: INTERNAL MEDICINE
Payer: MEDICARE

## 2023-12-12 VITALS
HEIGHT: 62 IN | TEMPERATURE: 98 F | DIASTOLIC BLOOD PRESSURE: 92 MMHG | HEART RATE: 80 BPM | RESPIRATION RATE: 18 BRPM | OXYGEN SATURATION: 100 % | SYSTOLIC BLOOD PRESSURE: 142 MMHG | WEIGHT: 167.19 LBS | BODY MASS INDEX: 30.77 KG/M2

## 2023-12-12 DIAGNOSIS — Z85.72 HISTORY OF FOLLICULAR LYMPHOMA: ICD-10-CM

## 2023-12-12 DIAGNOSIS — R59.0 PELVIC LYMPHADENOPATHY: ICD-10-CM

## 2023-12-12 DIAGNOSIS — R94.8 ABNORMAL POSITRON EMISSION TOMOGRAPHY (PET) SCAN: ICD-10-CM

## 2023-12-12 DIAGNOSIS — R22.1 NECK MASS: Primary | ICD-10-CM

## 2023-12-12 DIAGNOSIS — R59.0 CERVICAL LYMPHADENOPATHY: ICD-10-CM

## 2023-12-12 PROCEDURE — 3008F BODY MASS INDEX DOCD: CPT | Mod: CPTII,,, | Performed by: INTERNAL MEDICINE

## 2023-12-12 PROCEDURE — 99213 OFFICE O/P EST LOW 20 MIN: CPT | Mod: S$PBB,,, | Performed by: INTERNAL MEDICINE

## 2023-12-12 PROCEDURE — 3288F FALL RISK ASSESSMENT DOCD: CPT | Mod: CPTII,,, | Performed by: INTERNAL MEDICINE

## 2023-12-12 PROCEDURE — 1126F AMNT PAIN NOTED NONE PRSNT: CPT | Mod: CPTII,,, | Performed by: INTERNAL MEDICINE

## 2023-12-12 PROCEDURE — 3044F PR MOST RECENT HEMOGLOBIN A1C LEVEL <7.0%: ICD-10-PCS | Mod: CPTII,,, | Performed by: INTERNAL MEDICINE

## 2023-12-12 PROCEDURE — 3077F PR MOST RECENT SYSTOLIC BLOOD PRESSURE >= 140 MM HG: ICD-10-PCS | Mod: CPTII,,, | Performed by: INTERNAL MEDICINE

## 2023-12-12 PROCEDURE — 1101F PR PT FALLS ASSESS DOC 0-1 FALLS W/OUT INJ PAST YR: ICD-10-PCS | Mod: CPTII,,, | Performed by: INTERNAL MEDICINE

## 2023-12-12 PROCEDURE — 1159F PR MEDICATION LIST DOCUMENTED IN MEDICAL RECORD: ICD-10-PCS | Mod: CPTII,,, | Performed by: INTERNAL MEDICINE

## 2023-12-12 PROCEDURE — 3080F PR MOST RECENT DIASTOLIC BLOOD PRESSURE >= 90 MM HG: ICD-10-PCS | Mod: CPTII,,, | Performed by: INTERNAL MEDICINE

## 2023-12-12 PROCEDURE — 3008F PR BODY MASS INDEX (BMI) DOCUMENTED: ICD-10-PCS | Mod: CPTII,,, | Performed by: INTERNAL MEDICINE

## 2023-12-12 PROCEDURE — 99213 OFFICE O/P EST LOW 20 MIN: CPT | Mod: PBBFAC | Performed by: INTERNAL MEDICINE

## 2023-12-12 PROCEDURE — 3077F SYST BP >= 140 MM HG: CPT | Mod: CPTII,,, | Performed by: INTERNAL MEDICINE

## 2023-12-12 PROCEDURE — 1126F PR PAIN SEVERITY QUANTIFIED, NO PAIN PRESENT: ICD-10-PCS | Mod: CPTII,,, | Performed by: INTERNAL MEDICINE

## 2023-12-12 PROCEDURE — 3288F PR FALLS RISK ASSESSMENT DOCUMENTED: ICD-10-PCS | Mod: CPTII,,, | Performed by: INTERNAL MEDICINE

## 2023-12-12 PROCEDURE — 3044F HG A1C LEVEL LT 7.0%: CPT | Mod: CPTII,,, | Performed by: INTERNAL MEDICINE

## 2023-12-12 PROCEDURE — 1159F MED LIST DOCD IN RCRD: CPT | Mod: CPTII,,, | Performed by: INTERNAL MEDICINE

## 2023-12-12 PROCEDURE — 1160F RVW MEDS BY RX/DR IN RCRD: CPT | Mod: CPTII,,, | Performed by: INTERNAL MEDICINE

## 2023-12-12 PROCEDURE — 99213 PR OFFICE/OUTPT VISIT, EST, LEVL III, 20-29 MIN: ICD-10-PCS | Mod: S$PBB,,, | Performed by: INTERNAL MEDICINE

## 2023-12-12 PROCEDURE — 1160F PR REVIEW ALL MEDS BY PRESCRIBER/CLIN PHARMACIST DOCUMENTED: ICD-10-PCS | Mod: CPTII,,, | Performed by: INTERNAL MEDICINE

## 2023-12-12 PROCEDURE — 3080F DIAST BP >= 90 MM HG: CPT | Mod: CPTII,,, | Performed by: INTERNAL MEDICINE

## 2023-12-12 PROCEDURE — 1101F PT FALLS ASSESS-DOCD LE1/YR: CPT | Mod: CPTII,,, | Performed by: INTERNAL MEDICINE

## 2023-12-12 NOTE — Clinical Note
In March 2024, repeat contrast-enhanced CT scans of C/A/P/soft tissues of the neck with contrast, then follow-up with CBC and CMP.  Also, LDH and uric acid level. Screening mammogram in February 2024.

## 2024-01-22 ENCOUNTER — OFFICE VISIT (OUTPATIENT)
Dept: INTERNAL MEDICINE | Facility: CLINIC | Age: 69
End: 2024-01-22
Payer: MEDICARE

## 2024-01-22 ENCOUNTER — LAB VISIT (OUTPATIENT)
Dept: LAB | Facility: HOSPITAL | Age: 69
End: 2024-01-22
Attending: NURSE PRACTITIONER
Payer: MEDICARE

## 2024-01-22 VITALS
HEIGHT: 62 IN | SYSTOLIC BLOOD PRESSURE: 134 MMHG | WEIGHT: 168 LBS | HEART RATE: 73 BPM | DIASTOLIC BLOOD PRESSURE: 64 MMHG | RESPIRATION RATE: 16 BRPM | BODY MASS INDEX: 30.91 KG/M2 | TEMPERATURE: 98 F

## 2024-01-22 DIAGNOSIS — E55.9 VITAMIN D DEFICIENCY: ICD-10-CM

## 2024-01-22 DIAGNOSIS — R93.3 ABNORMAL COLONOSCOPY: Primary | ICD-10-CM

## 2024-01-22 DIAGNOSIS — R73.03 PREDIABETES: ICD-10-CM

## 2024-01-22 DIAGNOSIS — L29.9 PRURITUS: ICD-10-CM

## 2024-01-22 DIAGNOSIS — C82.80 FOLLICULAR LARGE CELL NON-HODGKIN'S LYMPHOMA: ICD-10-CM

## 2024-01-22 DIAGNOSIS — Z00.00 WELLNESS EXAMINATION: ICD-10-CM

## 2024-01-22 PROBLEM — I25.10 ARTERIOSCLEROSIS OF CORONARY ARTERY: Status: RESOLVED | Noted: 2022-08-01 | Resolved: 2024-01-22

## 2024-01-22 LAB
ALBUMIN SERPL-MCNC: 3.6 G/DL (ref 3.4–4.8)
ALBUMIN/GLOB SERPL: 1.1 RATIO (ref 1.1–2)
ALP SERPL-CCNC: 71 UNIT/L (ref 40–150)
ALT SERPL-CCNC: 14 UNIT/L (ref 0–55)
APPEARANCE UR: CLEAR
AST SERPL-CCNC: 22 UNIT/L (ref 5–34)
BACTERIA #/AREA URNS AUTO: ABNORMAL /HPF
BASOPHILS # BLD AUTO: 0.05 X10(3)/MCL
BASOPHILS NFR BLD AUTO: 1 %
BILIRUB SERPL-MCNC: 0.2 MG/DL
BILIRUB UR QL STRIP.AUTO: NEGATIVE
BUN SERPL-MCNC: 11.6 MG/DL (ref 9.8–20.1)
CALCIUM SERPL-MCNC: 8.9 MG/DL (ref 8.4–10.2)
CAOX CRY URNS QL MICRO: ABNORMAL /HPF
CHLORIDE SERPL-SCNC: 107 MMOL/L (ref 98–107)
CO2 SERPL-SCNC: 29 MMOL/L (ref 23–31)
COLOR UR AUTO: ABNORMAL
CREAT SERPL-MCNC: 0.8 MG/DL (ref 0.55–1.02)
DEPRECATED CALCIDIOL+CALCIFEROL SERPL-MC: 50 NG/ML (ref 30–80)
EOSINOPHIL # BLD AUTO: 0.24 X10(3)/MCL (ref 0–0.9)
EOSINOPHIL NFR BLD AUTO: 4.8 %
ERYTHROCYTE [DISTWIDTH] IN BLOOD BY AUTOMATED COUNT: 14.8 % (ref 11.5–17)
EST. AVERAGE GLUCOSE BLD GHB EST-MCNC: 114 MG/DL
GFR SERPLBLD CREATININE-BSD FMLA CKD-EPI: >60 MLS/MIN/1.73/M2
GLOBULIN SER-MCNC: 3.4 GM/DL (ref 2.4–3.5)
GLUCOSE SERPL-MCNC: 89 MG/DL (ref 82–115)
GLUCOSE UR QL STRIP.AUTO: NORMAL
HBA1C MFR BLD: 5.6 %
HCT VFR BLD AUTO: 39.3 % (ref 37–47)
HGB BLD-MCNC: 12.4 G/DL (ref 12–16)
HYALINE CASTS #/AREA URNS LPF: ABNORMAL /LPF
IMM GRANULOCYTES # BLD AUTO: 0.01 X10(3)/MCL (ref 0–0.04)
IMM GRANULOCYTES NFR BLD AUTO: 0.2 %
KETONES UR QL STRIP.AUTO: NEGATIVE
LEUKOCYTE ESTERASE UR QL STRIP.AUTO: 25
LYMPHOCYTES # BLD AUTO: 2.12 X10(3)/MCL (ref 0.6–4.6)
LYMPHOCYTES NFR BLD AUTO: 42.6 %
MCH RBC QN AUTO: 27.1 PG (ref 27–31)
MCHC RBC AUTO-ENTMCNC: 31.6 G/DL (ref 33–36)
MCV RBC AUTO: 85.8 FL (ref 80–94)
MONOCYTES # BLD AUTO: 0.6 X10(3)/MCL (ref 0.1–1.3)
MONOCYTES NFR BLD AUTO: 12 %
MUCOUS THREADS URNS QL MICRO: ABNORMAL /LPF
NEUTROPHILS # BLD AUTO: 1.96 X10(3)/MCL (ref 2.1–9.2)
NEUTROPHILS NFR BLD AUTO: 39.4 %
NITRITE UR QL STRIP.AUTO: NEGATIVE
NRBC BLD AUTO-RTO: 0 %
PH UR STRIP.AUTO: 6 [PH]
PLATELET # BLD AUTO: 216 X10(3)/MCL (ref 130–400)
PMV BLD AUTO: 11.3 FL (ref 7.4–10.4)
POTASSIUM SERPL-SCNC: 3.9 MMOL/L (ref 3.5–5.1)
PROT SERPL-MCNC: 7 GM/DL (ref 5.8–7.6)
PROT UR QL STRIP.AUTO: NEGATIVE
RBC # BLD AUTO: 4.58 X10(6)/MCL (ref 4.2–5.4)
RBC #/AREA URNS AUTO: ABNORMAL /HPF
RBC UR QL AUTO: ABNORMAL
SODIUM SERPL-SCNC: 142 MMOL/L (ref 136–145)
SP GR UR STRIP.AUTO: 1.02 (ref 1–1.03)
SQUAMOUS #/AREA URNS LPF: ABNORMAL /HPF
UROBILINOGEN UR STRIP-ACNC: NORMAL
WBC # SPEC AUTO: 4.98 X10(3)/MCL (ref 4.5–11.5)
WBC #/AREA URNS AUTO: ABNORMAL /HPF

## 2024-01-22 PROCEDURE — 99214 OFFICE O/P EST MOD 30 MIN: CPT | Mod: PBBFAC | Performed by: NURSE PRACTITIONER

## 2024-01-22 PROCEDURE — 1160F RVW MEDS BY RX/DR IN RCRD: CPT | Mod: CPTII,,, | Performed by: NURSE PRACTITIONER

## 2024-01-22 PROCEDURE — 83036 HEMOGLOBIN GLYCOSYLATED A1C: CPT

## 2024-01-22 PROCEDURE — 80053 COMPREHEN METABOLIC PANEL: CPT

## 2024-01-22 PROCEDURE — 3078F DIAST BP <80 MM HG: CPT | Mod: CPTII,,, | Performed by: NURSE PRACTITIONER

## 2024-01-22 PROCEDURE — 3008F BODY MASS INDEX DOCD: CPT | Mod: CPTII,,, | Performed by: NURSE PRACTITIONER

## 2024-01-22 PROCEDURE — 99214 OFFICE O/P EST MOD 30 MIN: CPT | Mod: S$PBB,,, | Performed by: NURSE PRACTITIONER

## 2024-01-22 PROCEDURE — 1159F MED LIST DOCD IN RCRD: CPT | Mod: CPTII,,, | Performed by: NURSE PRACTITIONER

## 2024-01-22 PROCEDURE — 81001 URINALYSIS AUTO W/SCOPE: CPT

## 2024-01-22 PROCEDURE — 82306 VITAMIN D 25 HYDROXY: CPT

## 2024-01-22 PROCEDURE — 85025 COMPLETE CBC W/AUTO DIFF WBC: CPT

## 2024-01-22 PROCEDURE — 36415 COLL VENOUS BLD VENIPUNCTURE: CPT

## 2024-01-22 PROCEDURE — 3075F SYST BP GE 130 - 139MM HG: CPT | Mod: CPTII,,, | Performed by: NURSE PRACTITIONER

## 2024-01-22 RX ORDER — TRIAMCINOLONE ACETONIDE 1 MG/G
CREAM TOPICAL 2 TIMES DAILY PRN
Qty: 15 G | Refills: 1 | Status: SHIPPED | OUTPATIENT
Start: 2024-01-22 | End: 2024-07-20

## 2024-01-22 NOTE — PROGRESS NOTES
"  KARLY Leija   [facility]  [address] [facility] [address] [facility] [id number]      PATIENT NAME: Jeane Cohen  : 1955  DATE: 24  MRN: [id number]      Patient PCP Information       Provider PCP Type    KARLY Leija General            Reason for Visit / Chief Complaint: Health Maintenance       History of Present Illness / Problem Focused Workflow     Jeane Cohen presents to the clinic with Health Maintenance     67 yo AAF here today for routine f/u. PMH Stage IV follicular Grade 1 non-Hodgkins lymphoma, diagnosed 2016, CAD, chronic hoarseness, chronic microscopic hematuria since , prediabetes, and Vitamin D deficiency.  Followed by Dr. Mace, [facility], Cardiology, & Urology Clinic. Jehovah Witness.  Never smoker.      Cervical Cancer Screening: [facility]  Breast Cancer Screenin23 MMG   Colon Cancer Screenin23 Referral to [facility] - 5 yr rescope  Osteoporosis Screenin22 DEXA. 23 Vit D Level 42.8    2024  Pt here today for routine f/u OV, labs completed just prior to OV during [facility] [place], will f/u on Friday via virtual for lab review. Pt reports with a "rash" to her left lower ankle area since  that comes and goes. The area is more of a hyperpigmentation area vs a rash. Reports that it is itchy at times, uses OTC neosporin with no relief. Will send rx of steroid cream today for eval. Dicussed with pt to mention to Oncology clinic at [facility]. Pt is due for Colonoscopy, last scope was 5 years ago here at [facility], will place referral today.   Denies chest pain, shortness of breath, cough, fever, headache, dizziness, weakness, abdominal pain, nausea, vomiting, diarrhea, constipation, black/bloody stools, unplanned weight loss, night sweats, changes in urinary patterns, burning/odor with urination, depression, anxiety, and SI/HI.             Review of Systems     Review of " "Systems      Medications and Allergies     Medications  Current Outpatient Medications   Medication Instructions    amoxicillin (AMOXIL) 875 mg, Oral, Every 12 hours    aspirin (ECOTRIN) 81 mg, Oral, Once, "Every other day"    b complex vitamins tablet 1 tablet, Oral, Daily    calcium carbonate 1,250 mg, Oral, 2 times daily with meals    magnesium 30 mg Tab Oral, Once    multivitamin capsule 1 capsule, Oral, Daily    triamcinolone acetonide 0.1% (KENALOG) 0.1 % cream Topical (Top), 2 times daily PRN    vitamin D (VITAMIN D3) 1,000 Units, Oral, Daily    zinc gluconate 50 mg, Oral, Daily         Allergies  Review of patient's allergies indicates:  No Known Allergies    Physical Examination     Visit Vitals  /64   Pulse 73   Temp 98.4 °F (36.9 °C)   Resp 16   Ht 5' 2" (1.575 m)   Wt 76.2 kg (168 lb)   BMI 30.73 kg/m²       Physical Exam  Skin:                 Results     Lab Results   Component Value Date    WBC 4.13 (L) 09/13/2023    RBC 4.53 09/13/2023    HGB 11.8 (L) 09/13/2023    HCT 38.4 09/13/2023    MCV 84.8 09/13/2023    MCH 26.0 (L) 09/13/2023    MCHC 30.7 (L) 09/13/2023    RDW 15.0 09/13/2023     09/13/2023    MPV 10.5 (H) 09/13/2023     CMP  Sodium Level   Date Value Ref Range Status   09/13/2023 145 136 - 145 mmol/L Final     Potassium Level   Date Value Ref Range Status   09/13/2023 3.9 3.5 - 5.1 mmol/L Final     Carbon Dioxide   Date Value Ref Range Status   09/13/2023 30 23 - 31 mmol/L Final     Blood Urea Nitrogen   Date Value Ref Range Status   09/13/2023 9.1 (L) 9.8 - 20.1 mg/dL Final     Creatinine   Date Value Ref Range Status   09/13/2023 0.78 0.55 - 1.02 mg/dL Final     Calcium Level Total   Date Value Ref Range Status   09/13/2023 9.3 8.4 - 10.2 mg/dL Final     Albumin Level   Date Value Ref Range Status   09/13/2023 3.5 3.4 - 4.8 g/dL Final     Bilirubin Total   Date Value Ref Range Status   09/13/2023 0.4 <=1.5 mg/dL Final     Alkaline Phosphatase   Date Value Ref Range Status "   09/13/2023 66 40 - 150 unit/L Final     Aspartate Aminotransferase   Date Value Ref Range Status   09/13/2023 17 5 - 34 unit/L Final     Alanine Aminotransferase   Date Value Ref Range Status   09/13/2023 14 0 - 55 unit/L Final     Estimated GFR-Non    Date Value Ref Range Status   02/28/2022 82 >=90      Lab Results   Component Value Date    CHOL 128 01/03/2023     Lab Results   Component Value Date    HDL 58 01/03/2023     Lab Results   Component Value Date    TRIG 44 01/03/2023     Lab Results   Component Value Date    VLDL 9 01/03/2023     Lab Results   Component Value Date    LDL 61.00 01/03/2023     Lab Results   Component Value Date    TSH 0.502 01/03/2023         Assessment        ICD-10-CM ICD-9-CM   1. Abnormal colonoscopy  R93.3 793.4   2. Pruritus  L29.9 698.9        Plan      Problem List Items Addressed This Visit          Derm    Pruritus    Current Assessment & Plan     Start RX triamcinolone cream prn itching  Monitor area           Relevant Medications    triamcinolone acetonide 0.1% (KENALOG) 0.1 % cream       GI    Abnormal colonoscopy - Primary    Current Assessment & Plan     Referral to [facility] for colonoscopy          Relevant Orders    Ambulatory referral/consult to Endo Procedure        Future Appointments   Date Time Provider Department Center   1/26/2024  8:20 AM Dayanna Treviño, KARLY [facility] INTMED [place]   2/15/2024  8:50 AM Eileen Stein FNP [facility] GYN [place]   2/19/2024  7:45 AM [facility] MAMMO1 [facility] M[facility]MO [place]   3/11/2024  8:00 AM [facility] CT2 500 LB LIMIT [facility] CTSCN [place]   3/11/2024  9:00 AM [facility] CT2 500 LB LIMIT [facility] CTSCN [place]   3/18/2024 10:15 AM NURSE, [facility] HEMATOLOGY ONCOLOGY [facility] [place]   3/18/2024 11:20 AM [facility]ace [facility]        No follow-ups on file.      Signature:     [facility]  [address]  [phone]    Date of encounter: 1/22/24

## 2024-01-25 DIAGNOSIS — Z12.11 COLON CANCER SCREENING: Primary | ICD-10-CM

## 2024-01-25 RX ORDER — POLYETHYLENE GLYCOL 3350, SODIUM SULFATE, SODIUM CHLORIDE, POTASSIUM CHLORIDE, SODIUM ASCORBATE, AND ASCORBIC ACID 7.5-2.691G
KIT ORAL
Qty: 1 KIT | Refills: 0 | Status: SHIPPED | OUTPATIENT
Start: 2024-01-25

## 2024-01-30 ENCOUNTER — ANESTHESIA EVENT (OUTPATIENT)
Dept: ENDOSCOPY | Facility: HOSPITAL | Age: 69
End: 2024-01-30
Payer: MEDICARE

## 2024-01-30 ENCOUNTER — TELEPHONE (OUTPATIENT)
Dept: INTERNAL MEDICINE | Facility: CLINIC | Age: 69
End: 2024-01-30
Payer: MEDICARE

## 2024-01-30 NOTE — TELEPHONE ENCOUNTER
----- Message from KARLY Leija sent at 1/26/2024  8:10 AM CST -----  Please inform the patient that her lab work from recently came back and was in normal and acceptable ranges. I will see her again in 1 year or sooner if she needs.    Thanks,    KARLY Britt    I contacted patient and informed provider reviewed lab with normal and acceptable findings. Patient voiced understanding

## 2024-01-30 NOTE — ANESTHESIA PREPROCEDURE EVALUATION
01/30/2024  Jeane Cohen is a 69 y.o., female for CLN    Vitals:    02/01/24 1101 02/01/24 1246 02/01/24 1300 02/01/24 1310   BP: 133/79 118/71 129/79 130/85   BP Location:       Patient Position:       Pulse:  76 76 70   Resp:       Temp:       TempSrc:       SpO2:  100% 100% 100%   Weight:       Height:             PMH Stage IV follicular Grade 1 non-Hodgkins lymphoma, diagnosed 9-, CAD, chronic hoarseness, chronic microscopic hematuria since 2009, prediabetes, and Vitamin D deficiency     Active Ambulatory Problems     Diagnosis Date Noted    Microscopic hematuria 08/01/2022    Prediabetes 08/01/2022    Vitamin D deficiency 08/01/2022    Cough 08/01/2022    Upper respiratory tract infection 08/01/2022    Polyp of colon 01/05/2023    Hordeolum externum of right upper eyelid 01/05/2023    Neck mass 01/05/2023    Personal history of lymphoma 02/19/2023    Cervical lymphadenopathy 02/19/2023    Pelvic lymphadenopathy 03/13/2023    Abnormal positron emission tomography (PET) scan 03/13/2023    History of follicular lymphoma 09/13/2023    Abnormal colonoscopy 01/22/2024    Pruritus 01/22/2024     Resolved Ambulatory Problems     Diagnosis Date Noted    Arteriosclerosis of coronary artery 08/01/2022    Follicular large cell non-Hodgkin's lymphoma 08/01/2022     Past Medical History:   Diagnosis Date    Chronic hoarseness     Large cell, follicular non-Hodgkin's lymphoma        Past Surgical History:   Procedure Laterality Date    catheter insertion mediport  01/25/2017    COLONOSCOPY  09/01/2017    HYSTERECTOMY  2002    Laparoscopy exploratory  01/25/2017    NECK MASS EXCISION Bilateral 3/17/2023    Procedure: EXCISION, MASS, NECK;  Surgeon: Jaron Son MD;  Location: [facility];  Service: ENT;  Laterality: Bilateral;    NECK MASS EXCISION Right 9/25/2023    Procedure: EXCISION, MASS, NECK;  Surgeon:  "Jaron Son MD;  Location: [facility];  Service: ENT;  Laterality: Right;    removal of mediport  06/10/2019    TUBAL LIGATION           Lab Results   Component Value Date    WBC 4.98 01/22/2024    HGB 12.4 01/22/2024    HCT 39.3 01/22/2024     01/22/2024    CHOL 128 01/03/2023    TRIG 44 01/03/2023    HDL 58 01/03/2023    ALT 14 01/22/2024    AST 22 01/22/2024     01/22/2024    K 3.9 01/22/2024    CREATININE 0.80 01/22/2024    BUN 11.6 01/22/2024    CO2 29 01/22/2024    TSH 0.502 01/03/2023    HGBA1C 5.6 01/22/2024       Current Facility-Administered Medications on File Prior to Encounter   Medication Dose Route Frequency Provider Last Rate Last Admin    lactated ringers infusion   Intravenous Continuous Caitlin Pfeiffer  mL/hr at 03/17/23 0659 Rate Change at 03/17/23 0659    lactated ringers infusion   Intravenous Continuous Caitlin Pfeiffer  mL/hr at 09/25/23 1126 Rate Change at 09/25/23 1126    LIDOcaine (PF) 10 mg/ml (1%) injection 10 mg  1 mL Intradermal Once Teri Hdz, TODDP         Current Outpatient Medications on File Prior to Encounter   Medication Sig Dispense Refill    b complex vitamins tablet Take 1 tablet by mouth once daily.      calcium carbonate 1250 MG capsule Take 1,250 mg by mouth 2 (two) times daily with meals.      magnesium 30 mg Tab Take by mouth once.      multivitamin capsule Take 1 capsule by mouth once daily.      vitamin D (VITAMIN D3) 1000 units Tab Take 1,000 Units by mouth once daily.      zinc gluconate 50 mg tablet Take 50 mg by mouth once daily.      aspirin (ECOTRIN) 81 MG EC tablet Take 81 mg by mouth once. "Every other day"      triamcinolone acetonide 0.1% (KENALOG) 0.1 % cream Apply topically 2 (two) times daily as needed (Itching). 15 g 1           Pre-op Assessment    I have reviewed the Patient Summary Reports.     I have reviewed the Nursing Notes. I have reviewed the NPO Status.   I have reviewed the Medications.     Review " of Systems  Anesthesia Hx:  No problems with previous Anesthesia   History of prior surgery of interest to airway management or planning:          Denies Family Hx of Anesthesia complications.    Denies Personal Hx of Anesthesia complications.                    Hematology/Oncology:  Hematology Normal   Oncology Normal                                   EENT/Dental:  EENT/Dental Normal           Cardiovascular:  Cardiovascular Normal                                            Pulmonary:  Pulmonary Normal                       Renal/:  Renal/ Normal                 Hepatic/GI:  Hepatic/GI Normal                 Musculoskeletal:  Musculoskeletal Normal                Neurological:  Neurology Normal                                      Endocrine:  Endocrine Normal            Dermatological:  Skin Normal    Psych:  Psychiatric Normal                    Physical Exam  General: Well nourished, Cooperative, Alert and Oriented    Airway:  Mallampati: I / I  Mouth Opening: Normal  TM Distance: Normal  Tongue: Normal  Neck ROM: Normal ROM    Dental:  Intact        Anesthesia Plan  Type of Anesthesia, risks & benefits discussed:    Anesthesia Type: Gen Natural Airway  Intra-op Monitoring Plan: Standard ASA Monitors  Post Op Pain Control Plan: IV/PO Opioids PRN  (medical reason for not using multimodal pain management)  Induction:  IV  Informed Consent: Informed consent signed with the Patient and all parties understand the risks and agree with anesthesia plan.  All questions answered. Patient consented to blood products? No  ASA Score: 3  Day of Surgery Review of History & Physical: H&P Update referred to the surgeon/provider.    Ready For Surgery From Anesthesia Perspective.     .

## 2024-02-01 ENCOUNTER — ANESTHESIA (OUTPATIENT)
Dept: ENDOSCOPY | Facility: HOSPITAL | Age: 69
End: 2024-02-01
Payer: MEDICARE

## 2024-02-01 ENCOUNTER — HOSPITAL ENCOUNTER (OUTPATIENT)
Facility: HOSPITAL | Age: 69
Discharge: HOME | End: 2024-02-01
Attending: INTERNAL MEDICINE | Admitting: INTERNAL MEDICINE
Payer: MEDICARE

## 2024-02-01 DIAGNOSIS — K57.30 DIVERTICULOSIS LARGE INTESTINE W/O PERFORATION OR ABSCESS W/O BLEEDING: Primary | ICD-10-CM

## 2024-02-01 PROCEDURE — 63600175 PHARM REV CODE 636 W HCPCS: Performed by: ANESTHESIOLOGY

## 2024-02-01 PROCEDURE — 37000008 HC ANESTHESIA 1ST 15 MINUTES: Performed by: INTERNAL MEDICINE

## 2024-02-01 PROCEDURE — G0105 COLORECTAL SCRN; HI RISK IND: HCPCS | Performed by: INTERNAL MEDICINE

## 2024-02-01 PROCEDURE — 37000009 HC ANESTHESIA EA ADD 15 MINS: Performed by: INTERNAL MEDICINE

## 2024-02-01 PROCEDURE — 63600175 PHARM REV CODE 636 W HCPCS: Performed by: NURSE ANESTHETIST, CERTIFIED REGISTERED

## 2024-02-01 PROCEDURE — D9220A PRA ANESTHESIA: Mod: ,,, | Performed by: NURSE ANESTHETIST, CERTIFIED REGISTERED

## 2024-02-01 PROCEDURE — 63600175 PHARM REV CODE 636 W HCPCS: Performed by: SPECIALIST

## 2024-02-01 PROCEDURE — 25000003 PHARM REV CODE 250: Performed by: NURSE ANESTHETIST, CERTIFIED REGISTERED

## 2024-02-01 PROCEDURE — G0105 COLORECTAL SCRN; HI RISK IND: HCPCS | Mod: ,,, | Performed by: INTERNAL MEDICINE

## 2024-02-01 RX ORDER — LIDOCAINE HYDROCHLORIDE 20 MG/ML
INJECTION INTRAVENOUS
Status: DISCONTINUED | OUTPATIENT
Start: 2024-02-01 | End: 2024-02-01

## 2024-02-01 RX ORDER — PROPOFOL 10 MG/ML
VIAL (ML) INTRAVENOUS
Status: DISCONTINUED | OUTPATIENT
Start: 2024-02-01 | End: 2024-02-01

## 2024-02-01 RX ORDER — SODIUM CHLORIDE, SODIUM LACTATE, POTASSIUM CHLORIDE, CALCIUM CHLORIDE 600; 310; 30; 20 MG/100ML; MG/100ML; MG/100ML; MG/100ML
INJECTION, SOLUTION INTRAVENOUS CONTINUOUS
Status: DISCONTINUED | OUTPATIENT
Start: 2024-02-01 | End: 2024-02-01 | Stop reason: HOSPADM

## 2024-02-01 RX ADMIN — LIDOCAINE HYDROCHLORIDE 50 MG: 20 INJECTION INTRAVENOUS at 12:02

## 2024-02-01 RX ADMIN — PROPOFOL 50 MG: 10 INJECTION, EMULSION INTRAVENOUS at 12:02

## 2024-02-01 RX ADMIN — SODIUM CHLORIDE, POTASSIUM CHLORIDE, SODIUM LACTATE AND CALCIUM CHLORIDE: 600; 310; 30; 20 INJECTION, SOLUTION INTRAVENOUS at 12:02

## 2024-02-01 RX ADMIN — SODIUM CHLORIDE, POTASSIUM CHLORIDE, SODIUM LACTATE AND CALCIUM CHLORIDE: 600; 310; 30; 20 INJECTION, SOLUTION INTRAVENOUS at 10:02

## 2024-02-01 NOTE — PROVATION PATIENT INSTRUCTIONS
Discharge Summary/Instructions after an Endoscopic Procedure  Patient Name: Jeane Cohen  Patient MRN: [id number]  Patient YOB: 1955 Thursday, February 1, 2024  Sae Cooper MD  Dear patient,  As a result of recent federal legislation (The Federal Cures Act), you may   receive lab or pathology results from your procedure in your [facility]   account before your physician is able to contact you. Your physician or   their representative will relay the results to you with their   recommendations at their soonest availability.  Thank you,  RESTRICTIONS:  During your procedure today, you received medications for sedation.  These   medications may affect your judgment, balance and coordination.  Therefore,   for 24 hours, you have the following restrictions:   - DO NOT drive a car, operate machinery, make legal/financial decisions,   sign important papers or drink alcohol.    ACTIVITY:  Today: no heavy lifting, straining or running due to procedural   sedation/anesthesia.  The following day: return to full activity including work.  DIET:  Eat and drink normally unless instructed otherwise.     TREATMENT FOR COMMON SIDE EFFECTS:  - Mild abdominal pain, nausea, belching, bloating or excessive gas:  rest,   eat lightly and use a heating pad.  - Sore Throat: treat with throat lozenges and/or gargle with warm salt   water.  - Because air was used during the procedure, expelling large amounts of air   from your rectum or belching is normal.  - If a bowel prep was taken, you may not have a bowel movement for 1-3 days.    This is normal.  SYMPTOMS TO WATCH FOR AND REPORT TO YOUR PHYSICIAN:  1. Abdominal pain or bloating, other than gas cramps.  2. Chest pain.  3. Back pain.  4. Signs of infection such as: chills or fever occurring within 24 hours   after the procedure.  5. Rectal bleeding, which would show as bright red, maroon, or black stools.   (A tablespoon of blood from the rectum is not serious, especially  if   hemorrhoids are present.)  6. Vomiting.  7. Weakness or dizziness.  GO DIRECTLY TO THE NEAREST EMERGENCY ROOM IF YOU HAVE ANY OF THE FOLLOWING:      Difficulty breathing              Chills and/or fever over 101 F   Persistent vomiting and/or vomiting blood   Severe abdominal pain   Severe chest pain   Black, tarry stools   Bleeding- more than one tablespoon   Any other symptom or condition that you feel may need urgent attention  Your doctor recommends these additional instructions:  If any biopsies were taken, your doctors clinic will contact you in 1 to 2   weeks with any results.  - Discharge patient to home (ambulatory).   - Resume previous diet today.   - Repeat colonoscopy in 5 years for surveillance.  For questions, problems or results please call your physician - Sae Cooper MD at Work:  [phone].  [facility] , EMERGENCY ROOM PHONE NUMBER: [phone]  IF A COMPLICATION OR EMERGENCY SITUATION ARISES AND YOU ARE UNABLE TO REACH   YOUR PHYSICIAN - GO DIRECTLY TO THE EMERGENCY ROOM.  MD Sae Merion MD  2/1/2024 12:53:51 PM  This report has been verified and signed electronically.  Dear patient,  As a result of recent federal legislation (The Federal Cures Act), you may   receive lab or pathology results from your procedure in your [facility]   account before your physician is able to contact you. Your physician or   their representative will relay the results to you with their   recommendations at their soonest availability.  Thank you,  PROVATION

## 2024-02-01 NOTE — TRANSFER OF CARE
Anesthesia Transfer of Care Note    Patient: Jeane Cohen    Procedure(s) Performed: Procedure(s) (LRB):  COLONOSCOPY (N/A)    Patient location: GI    Anesthesia Type: general    Post pain: adequate analgesia    Post assessment: no apparent anesthetic complications    Post vital signs: stable    Level of consciousness: awake    Nausea/Vomiting: no nausea/vomiting    Complications: none    Transfer of care protocol was followed      Last vitals:

## 2024-02-01 NOTE — ANESTHESIA POSTPROCEDURE EVALUATION
Anesthesia Post Evaluation    Patient: Jeane Cohen    Procedure(s) Performed: Procedure(s) (LRB):  COLONOSCOPY (N/A)    Final Anesthesia Type: general      Patient location during evaluation: GI PACU  Patient participation: Yes- Able to Participate  Level of consciousness: awake and alert  Pain management: adequate  Airway patency: patent      Anesthetic complications: no      Cardiovascular status: hemodynamically stable  Respiratory status: unassisted, room air and spontaneous ventilation  Hydration status: euvolemic  Follow-up not needed.                No case tracking events are documented in the log.      Pain/Gary Score: No data recorded

## 2024-02-01 NOTE — H&P
Colonoscopy History and Physical    Patient Name: Jeane Cohen  MRN: [id number]  : 1955  Date of Procedure:  2024  Referring Physician: Dayanna Treviño FNP  Primary Physician: Dayanna Treviño FNP  Procedure Physician: Sae Cooper MD     Procedure - Colonoscopy  ASA - per anesthesia  Mallampati - per anesthesia  History of Anesthesia problems - no  Family history Anesthesia problems -  no   Plan of anesthesia - General    Diagnosis: previous adenomatous polyp  Chief Complaint: Same as above    HPI: Patient is an 69 y.o. female is here for the above.     Last colonoscopy: Colonoscopy History and Physical    Patient Name: Jeane Cohen  MRN: [id number]  : 1955  Date of Procedure:  2024  Referring Physician: Dayanna Treviño FNP  Primary Physician: Dayanna Treviño FNP  Procedure Physician: Gracie Douglas MD, MPH     Procedure - Colonoscopy  ASA - per anesthesia  Mallampati - per anesthesia  History of Anesthesia problems - no  Family history Anesthesia problems -  no   Plan of anesthesia - General    Diagnosis: previous adenomatous polyp  Chief Complaint: Same as above    HPI: Patient is an 69 y.o. female is here for the above.   She had a polyp removed 5 years ago and has had no GI symptoms since that procedure.  Her appetite is good and her weight is stable.  Her bowels are functioning normally with no constipation or diarrhea.  She has had no overt rectal bleeding.  She denies abdominal pain of any description.    Last colonoscopy:   Five years ago   Family history:  negative  Anticoagulation:  no    ROS:  Constitutional: No fevers, chills, No weight loss  CV: No chest pain  Pulm: No cough, No shortness of breath  GI: see HPI    Medical History:   Past Medical History:   Diagnosis Date    Chronic hoarseness     Large cell, follicular non-Hodgkin's lymphoma     Microscopic hematuria     Prediabetes     Vitamin D deficiency          Surgical History:   Past Surgical History:   Procedure Laterality  Date    catheter insertion mediport  01/25/2017    COLONOSCOPY  09/01/2017    HYSTERECTOMY  2002    Laparoscopy exploratory  01/25/2017    NECK MASS EXCISION Bilateral 3/17/2023    Procedure: EXCISION, MASS, NECK;  Surgeon: Jaron Son MD;  [facility];  Service: ENT;  Laterality: Bilateral;    NECK MASS EXCISION Right 9/25/2023    Procedure: EXCISION, MASS, NECK;  Surgeon: Jaron Son MD;  Location: [facility] OR;  Service: ENT;  Laterality: Right;    removal of mediport  06/10/2019    TUBAL LIGATION         Family History:   Family History   Problem Relation Age of Onset    Hypertension Mother     Heart disease Mother     Hypertension Father     Stroke Father    .    Social History:   Social History     Socioeconomic History    Marital status:    Tobacco Use    Smoking status: Never    Smokeless tobacco: Never   Substance and Sexual Activity    Alcohol use: Never    Drug use: Never    Sexual activity: Not Currently     Partners: Male     Social Determinants of Health     Financial Resource Strain: Low Risk  (1/5/2023)    Overall Financial Resource Strain (CARDIA)     Difficulty of Paying Living Expenses: Not hard at all   Food Insecurity: No Food Insecurity (1/5/2023)    Hunger Vital Sign     Worried About Running Out of Food in the Last Year: Never true     Ran Out of Food in the Last Year: Never true   Transportation Needs: No Transportation Needs (1/5/2023)    PRAPARE - Transportation     Lack of Transportation (Medical): No     Lack of Transportation (Non-Medical): No   Physical Activity: Insufficiently Active (1/5/2023)    Exercise Vital Sign     Days of Exercise per Week: 5 days     Minutes of Exercise per Session: 20 min   Stress: No Stress Concern Present (1/5/2023)    [place] [place] [employer] - Occupational Stress Questionnaire     Feeling of Stress : Not at all   Social Connections: Moderately Integrated (1/5/2023)    Social Connection and Isolation Panel [NHANES]      "Frequency of Communication with Friends and Family: More than three times a week     Frequency of Social Gatherings with Friends and Family: More than three times a week     Attends Yazidi Services: More than 4 times per year     Active Member of Clubs or Organizations: No     Attends Club or Organization Meetings: Never     Marital Status:    Housing Stability: Unknown (1/5/2023)    Housing Stability Vital Sign     Unable to Pay for Housing in the Last Year: No     Unstable Housing in the Last Year: No       Review of patient's allergies indicates:  No Known Allergies    Medications:   Medications Prior to Admission   Medication Sig Dispense Refill Last Dose    aspirin (ECOTRIN) 81 MG EC tablet Take 81 mg by mouth once. "Every other day"   1/31/2024    b complex vitamins tablet Take 1 tablet by mouth once daily.   1/31/2024    calcium carbonate 1250 MG capsule Take 1,250 mg by mouth 2 (two) times daily with meals.   1/31/2024    magnesium 30 mg Tab Take by mouth once.   1/31/2024    multivitamin capsule Take 1 capsule by mouth once daily.   Taking    vitamin D (VITAMIN D3) 1000 units Tab Take 1,000 Units by mouth once daily.   1/31/2024    zinc gluconate 50 mg tablet Take 50 mg by mouth once daily.   1/31/2024    polyethylene glycol (MOVIPREP) 100-7.5-2.691 gram solution Take as directed prior to colonoscopy 1 kit 0     triamcinolone acetonide 0.1% (KENALOG) 0.1 % cream Apply topically 2 (two) times daily as needed (Itching). 15 g 1          Physical Exam:    Vital Signs:   Vitals:    02/01/24 1101   BP: 133/79   Pulse:    Resp:    Temp:      /79   Pulse 81   Temp 97.9 °F (36.6 °C) (Oral)   Resp 18   Ht 5' 4" (1.626 m)   Wt 73.8 kg (162 lb 9.6 oz)   SpO2 100%   BMI 27.91 kg/m²     General:          Well appearing in no acute distress  Lungs: Clear to auscultation bilaterally, respirations unlabored  Heart: Regular rate and rhythm, S1 and S2 normal, no obvious murmurs  Abdomen:         Soft, " "non-tender, bowel sounds normal, no masses, no organomegaly        Labs:  Lab Results   Component Value Date    WBC 4.98 01/22/2024    HGB 12.4 01/22/2024    HCT 39.3 01/22/2024    MCV 85.8 01/22/2024     01/22/2024     No results found for: "INR", "PT", "APTT"  Lab Results   Component Value Date     01/22/2024    K 3.9 01/22/2024    CO2 29 01/22/2024    BUN 11.6 01/22/2024    CREATININE 0.80 01/22/2024    LABPROT 7.0 01/22/2024    ALBUMIN 3.6 01/22/2024    BILITOT 0.2 01/22/2024    ALKPHOS 71 01/22/2024    ALT 14 01/22/2024    AST 22 01/22/2024         Assessment and Plan:    History reviewed, vital signs satisfactory, cardiopulmonary status satisfactory.  I have explained the sedation options, risks, benefits, and alternatives of this endoscopic procedure to the patient including but not limited to bleeding, inflammation, infection, perforation, and death.  All questions were answered and the patient consented to proceed with procedure as planned.   The patient is deemed an appropriate candidate for the sedation as planned.      Sae Cooper MD   of Clinical Medicine  Gastroenterology and Hepatology  [facility]    2/1/2024  12:07 PM    Family history:  no  Anticoagulation:  no    ROS:  Constitutional: No fevers, chills, No weight loss  CV: No chest pain  Pulm: No cough, No shortness of breath  GI: see HPI    Medical History:   Past Medical History:   Diagnosis Date    Chronic hoarseness     Large cell, follicular non-Hodgkin's lymphoma     Microscopic hematuria     Prediabetes     Vitamin D deficiency          Surgical History:   Past Surgical History:   Procedure Laterality Date    catheter insertion mediport  01/25/2017    COLONOSCOPY  09/01/2017    HYSTERECTOMY  2002    Laparoscopy exploratory  01/25/2017    NECK MASS EXCISION Bilateral 3/17/2023    Procedure: EXCISION, MASS, NECK;  Surgeon: Jaron Son MD;  Location: [facility] OR;  " Service: ENT;  Laterality: Bilateral;    NECK MASS EXCISION Right 9/25/2023    Procedure: EXCISION, MASS, NECK;  Surgeon: Jaron Son MD;  Location: [facility];  Service: ENT;  Laterality: Right;    removal of mediport  06/10/2019    TUBAL LIGATION         Family History:   Family History   Problem Relation Age of Onset    Hypertension Mother     Heart disease Mother     Hypertension Father     Stroke Father    .    Social History:   Social History     Socioeconomic History    Marital status:    Tobacco Use    Smoking status: Never    Smokeless tobacco: Never   Substance and Sexual Activity    Alcohol use: Never    Drug use: Never    Sexual activity: Not Currently     Partners: Male     Social Determinants of Health     Financial Resource Strain: Low Risk  (1/5/2023)    Overall Financial Resource Strain (CARDIA)     Difficulty of Paying Living Expenses: Not hard at all   Food Insecurity: No Food Insecurity (1/5/2023)    Hunger Vital Sign     Worried About Running Out of Food in the Last Year: Never true     Ran Out of Food in the Last Year: Never true   Transportation Needs: No Transportation Needs (1/5/2023)    PRAPARE - Transportation     Lack of Transportation (Medical): No     Lack of Transportation (Non-Medical): No   Physical Activity: Insufficiently Active (1/5/2023)    Exercise Vital Sign     Days of Exercise per Week: 5 days     Minutes of Exercise per Session: 20 min   Stress: No Stress Concern Present (1/5/2023)    [place] [employer] - Occupational Stress Questionnaire     Feeling of Stress : Not at all   Social Connections: Moderately Integrated (1/5/2023)    Social Connection and Isolation Panel [NHANES]     Frequency of Communication with Friends and Family: More than three times a week     Frequency of Social Gatherings with Friends and Family: More than three times a week     Attends Confucianist Services: More than 4 times per year     Active Member of Clubs or  "Organizations: No     Attends Club or Organization Meetings: Never     Marital Status:    Housing Stability: Unknown (1/5/2023)    Housing Stability Vital Sign     Unable to Pay for Housing in the Last Year: No     Unstable Housing in the Last Year: No       Review of patient's allergies indicates:  No Known Allergies    Medications:   Medications Prior to Admission   Medication Sig Dispense Refill Last Dose    aspirin (ECOTRIN) 81 MG EC tablet Take 81 mg by mouth once. "Every other day"   1/31/2024    b complex vitamins tablet Take 1 tablet by mouth once daily.   1/31/2024    calcium carbonate 1250 MG capsule Take 1,250 mg by mouth 2 (two) times daily with meals.   1/31/2024    magnesium 30 mg Tab Take by mouth once.   1/31/2024    multivitamin capsule Take 1 capsule by mouth once daily.   Taking    vitamin D (VITAMIN D3) 1000 units Tab Take 1,000 Units by mouth once daily.   1/31/2024    zinc gluconate 50 mg tablet Take 50 mg by mouth once daily.   1/31/2024    polyethylene glycol (MOVIPREP) 100-7.5-2.691 gram solution Take as directed prior to colonoscopy 1 kit 0     triamcinolone acetonide 0.1% (KENALOG) 0.1 % cream Apply topically 2 (two) times daily as needed (Itching). 15 g 1          Physical Exam:    Vital Signs:   Vitals:    02/01/24 1101   BP: 133/79   Pulse:    Resp:    Temp:      /79   Pulse 81   Temp 97.9 °F (36.6 °C) (Oral)   Resp 18   Ht 5' 4" (1.626 m)   Wt 73.8 kg (162 lb 9.6 oz)   SpO2 100%   BMI 27.91 kg/m²     General:          Well appearing in no acute distress  Lungs: Clear to auscultation bilaterally, respirations unlabored  Heart: Regular rate and rhythm, S1 and S2 normal, no obvious murmurs  Abdomen:         Soft, non-tender, bowel sounds normal, no masses, no organomegaly        Labs:  Lab Results   Component Value Date    WBC 4.98 01/22/2024    HGB 12.4 01/22/2024    HCT 39.3 01/22/2024    MCV 85.8 01/22/2024     01/22/2024     No results found for: "INR", " ""PT", "APTT"  Lab Results   Component Value Date     01/22/2024    K 3.9 01/22/2024    CO2 29 01/22/2024    BUN 11.6 01/22/2024    CREATININE 0.80 01/22/2024    LABPROT 7.0 01/22/2024    ALBUMIN 3.6 01/22/2024    BILITOT 0.2 01/22/2024    ALKPHOS 71 01/22/2024    ALT 14 01/22/2024    AST 22 01/22/2024         Assessment and Plan:    History reviewed, vital signs satisfactory, cardiopulmonary status satisfactory.  I have explained the sedation options, risks, benefits, and alternatives of this endoscopic procedure to the patient including but not limited to bleeding, inflammation, infection, perforation, and death.  All questions were answered and the patient consented to proceed with procedure as planned.   The patient is deemed an appropriate candidate for the sedation as planned.      Sae Cooper MD   of Clinical Medicine  Gastroenterology and Hepatology  [facility]    2/1/2024  12:00 PM     "

## 2024-02-01 NOTE — DISCHARGE SUMMARY
[facility] - Endoscopy  Discharge Note  Short Stay    Procedure(s) (LRB):  COLONOSCOPY (N/A)  Consent was obtained, the patient was transferred to the GI suite.  General IV anesthesia was provided by anesthesia Services.  Rectal exam was performed and normal.  The Olympus videocolonoscope was introduced per rectum and advanced around the colon to the cecum.  The ileocecal valve and appendiceal orifice were identified and normal.  Careful examination of the colonic mucosa including the ascending, transverse, descending and sigmoid colon revealed no polyps, colitis or other abnormalities.  Scattered diverticula were noted in the distal descending and sigmoid colon with no evidence of diverticulitis.  The rectum was normal including retroflexed view.  The endoscope was withdrawn, withdrawal time cecum to rectum 14 minutes.  The procedure was well tolerated the patient returned to the recovery area for observation.      Discharge plan- patient will resume a regular diet today and normal activities tomorrow.  A follow-up colonoscopy in 5 years is recommended.    OUTCOME: Patient tolerated treatment/procedure well without complication and is now ready for discharge.    DISPOSITION: Home or Self Care    FINAL DIAGNOSIS:  <principal problem not specified>    FOLLOWUP: In clinic    DISCHARGE INSTRUCTIONS:    Discharge Procedure Orders   Diet general     Call MD for:  temperature >100.4     Call MD for:  persistent nausea and vomiting     Call MD for:  severe uncontrolled pain     Call MD for:  difficulty breathing, headache or visual disturbances     Activity as tolerated         Clinical Reference Documents Added to Patient Instructions         Document    COLONOSCOPY DISCHARGE INSTRUCTIONS (ENGLISH)            TIME SPENT ON DISCHARGE: 10 minutes

## 2024-02-02 VITALS
HEIGHT: 64 IN | SYSTOLIC BLOOD PRESSURE: 157 MMHG | WEIGHT: 162.63 LBS | RESPIRATION RATE: 18 BRPM | DIASTOLIC BLOOD PRESSURE: 87 MMHG | HEART RATE: 65 BPM | TEMPERATURE: 98 F | BODY MASS INDEX: 27.77 KG/M2 | OXYGEN SATURATION: 100 %

## 2024-02-19 ENCOUNTER — HOSPITAL ENCOUNTER (OUTPATIENT)
Dept: RADIOLOGY | Facility: HOSPITAL | Age: 69
Discharge: HOME | End: 2024-02-19
Attending: INTERNAL MEDICINE
Payer: MEDICARE

## 2024-02-19 DIAGNOSIS — C82.80 FOLLICULAR LARGE CELL NON-HODGKIN'S LYMPHOMA: ICD-10-CM

## 2024-02-19 DIAGNOSIS — Z12.31 OTHER SCREENING MAMMOGRAM: ICD-10-CM

## 2024-02-19 PROCEDURE — 77067 SCR MAMMO BI INCL CAD: CPT | Mod: TC

## 2024-02-19 PROCEDURE — 77067 SCR MAMMO BI INCL CAD: CPT | Mod: 26,,, | Performed by: RADIOLOGY

## 2024-02-19 PROCEDURE — 77063 BREAST TOMOSYNTHESIS BI: CPT | Mod: 26,,, | Performed by: RADIOLOGY

## 2024-03-11 ENCOUNTER — HOSPITAL ENCOUNTER (OUTPATIENT)
Dept: RADIOLOGY | Facility: HOSPITAL | Age: 69
Discharge: HOME | End: 2024-03-11
Attending: INTERNAL MEDICINE
Payer: MEDICARE

## 2024-03-11 DIAGNOSIS — Z85.72 HISTORY OF FOLLICULAR LYMPHOMA: ICD-10-CM

## 2024-03-11 DIAGNOSIS — R59.0 CERVICAL LYMPHADENOPATHY: ICD-10-CM

## 2024-03-11 DIAGNOSIS — R22.1 NECK MASS: ICD-10-CM

## 2024-03-11 DIAGNOSIS — R94.8 ABNORMAL POSITRON EMISSION TOMOGRAPHY (PET) SCAN: ICD-10-CM

## 2024-03-11 LAB
CREAT SERPL-MCNC: 0.8 MG/DL (ref 0.55–1.02)
GFR SERPLBLD CREATININE-BSD FMLA CKD-EPI: >60 MLS/MIN/1.73/M2

## 2024-03-11 PROCEDURE — 74177 CT ABD & PELVIS W/CONTRAST: CPT | Mod: TC

## 2024-03-11 PROCEDURE — 70491 CT SOFT TISSUE NECK W/DYE: CPT | Mod: TC

## 2024-03-11 PROCEDURE — 25500020 PHARM REV CODE 255

## 2024-03-11 PROCEDURE — A9698 NON-RAD CONTRAST MATERIALNOC: HCPCS

## 2024-03-11 PROCEDURE — 25500020 PHARM REV CODE 255: Performed by: INTERNAL MEDICINE

## 2024-03-11 PROCEDURE — 82565 ASSAY OF CREATININE: CPT | Performed by: INTERNAL MEDICINE

## 2024-03-11 RX ADMIN — IOHEXOL 100 ML: 350 INJECTION, SOLUTION INTRAVENOUS at 09:03

## 2024-03-11 RX ADMIN — BARIUM SULFATE 450 ML: 20 SUSPENSION ORAL at 08:03
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
## 2024-03-25 ENCOUNTER — OFFICE VISIT (OUTPATIENT)
Dept: HEMATOLOGY/ONCOLOGY | Facility: CLINIC | Age: 69
End: 2024-03-25
Attending: INTERNAL MEDICINE
Payer: MEDICARE

## 2024-03-25 ENCOUNTER — APPOINTMENT (OUTPATIENT)
Dept: HEMATOLOGY/ONCOLOGY | Facility: CLINIC | Age: 69
End: 2024-03-25
Payer: MEDICARE

## 2024-03-25 VITALS
HEIGHT: 64 IN | SYSTOLIC BLOOD PRESSURE: 145 MMHG | DIASTOLIC BLOOD PRESSURE: 82 MMHG | OXYGEN SATURATION: 100 % | BODY MASS INDEX: 28.51 KG/M2 | RESPIRATION RATE: 17 BRPM | WEIGHT: 167 LBS | HEART RATE: 71 BPM | TEMPERATURE: 98 F

## 2024-03-25 DIAGNOSIS — R59.0 PELVIC LYMPHADENOPATHY: ICD-10-CM

## 2024-03-25 DIAGNOSIS — R22.1 NECK MASS: ICD-10-CM

## 2024-03-25 DIAGNOSIS — Z08 ENCOUNTER FOR FOLLOW-UP SURVEILLANCE OF LYMPHOMA: ICD-10-CM

## 2024-03-25 DIAGNOSIS — R59.0 CERVICAL LYMPHADENOPATHY: ICD-10-CM

## 2024-03-25 DIAGNOSIS — R94.8 ABNORMAL POSITRON EMISSION TOMOGRAPHY (PET) SCAN: ICD-10-CM

## 2024-03-25 DIAGNOSIS — C82.80 FOLLICULAR LARGE CELL NON-HODGKIN'S LYMPHOMA: ICD-10-CM

## 2024-03-25 DIAGNOSIS — R94.8 ABNORMAL POSITRON EMISSION TOMOGRAPHY (PET) SCAN: Primary | ICD-10-CM

## 2024-03-25 DIAGNOSIS — R31.29 MICROSCOPIC HEMATURIA: ICD-10-CM

## 2024-03-25 DIAGNOSIS — Z85.72 ENCOUNTER FOR FOLLOW-UP SURVEILLANCE OF LYMPHOMA: ICD-10-CM

## 2024-03-25 DIAGNOSIS — Z85.72 HISTORY OF FOLLICULAR LYMPHOMA: ICD-10-CM

## 2024-03-25 DIAGNOSIS — Z85.72 HISTORY OF FOLLICULAR LYMPHOMA: Primary | ICD-10-CM

## 2024-03-25 LAB
ALBUMIN SERPL-MCNC: 3.6 G/DL (ref 3.4–4.8)
ALBUMIN/GLOB SERPL: 1.2 RATIO (ref 1.1–2)
ALP SERPL-CCNC: 77 UNIT/L (ref 40–150)
ALT SERPL-CCNC: 19 UNIT/L (ref 0–55)
AST SERPL-CCNC: 21 UNIT/L (ref 5–34)
BASOPHILS # BLD AUTO: 0.04 X10(3)/MCL
BASOPHILS NFR BLD AUTO: 0.7 %
BILIRUB SERPL-MCNC: 0.3 MG/DL
BUN SERPL-MCNC: 18.8 MG/DL (ref 9.8–20.1)
CALCIUM SERPL-MCNC: 9.1 MG/DL (ref 8.4–10.2)
CHLORIDE SERPL-SCNC: 107 MMOL/L (ref 98–107)
CO2 SERPL-SCNC: 27 MMOL/L (ref 23–31)
CREAT SERPL-MCNC: 0.78 MG/DL (ref 0.55–1.02)
EOSINOPHIL # BLD AUTO: 0.15 X10(3)/MCL (ref 0–0.9)
EOSINOPHIL NFR BLD AUTO: 2.8 %
ERYTHROCYTE [DISTWIDTH] IN BLOOD BY AUTOMATED COUNT: 15.2 % (ref 11.5–17)
GFR SERPLBLD CREATININE-BSD FMLA CKD-EPI: >60 MLS/MIN/1.73/M2
GLOBULIN SER-MCNC: 3.1 GM/DL (ref 2.4–3.5)
GLUCOSE SERPL-MCNC: 81 MG/DL (ref 82–115)
HCT VFR BLD AUTO: 38.1 % (ref 37–47)
HGB BLD-MCNC: 12.1 G/DL (ref 12–16)
IMM GRANULOCYTES # BLD AUTO: 0.01 X10(3)/MCL (ref 0–0.04)
IMM GRANULOCYTES NFR BLD AUTO: 0.2 %
LDH SERPL-CCNC: 178 U/L (ref 125–220)
LYMPHOCYTES # BLD AUTO: 2.45 X10(3)/MCL (ref 0.6–4.6)
LYMPHOCYTES NFR BLD AUTO: 45.1 %
MCH RBC QN AUTO: 26.8 PG (ref 27–31)
MCHC RBC AUTO-ENTMCNC: 31.8 G/DL (ref 33–36)
MCV RBC AUTO: 84.3 FL (ref 80–94)
MONOCYTES # BLD AUTO: 0.74 X10(3)/MCL (ref 0.1–1.3)
MONOCYTES NFR BLD AUTO: 13.6 %
NEUTROPHILS # BLD AUTO: 2.04 X10(3)/MCL (ref 2.1–9.2)
NEUTROPHILS NFR BLD AUTO: 37.6 %
NRBC BLD AUTO-RTO: 0 %
PLATELET # BLD AUTO: 188 X10(3)/MCL (ref 130–400)
PMV BLD AUTO: 10.7 FL (ref 7.4–10.4)
POTASSIUM SERPL-SCNC: 4.3 MMOL/L (ref 3.5–5.1)
PROT SERPL-MCNC: 6.7 GM/DL (ref 5.8–7.6)
RBC # BLD AUTO: 4.52 X10(6)/MCL (ref 4.2–5.4)
SODIUM SERPL-SCNC: 140 MMOL/L (ref 136–145)
URATE SERPL-MCNC: 4.1 MG/DL (ref 2.6–6)
WBC # SPEC AUTO: 5.43 X10(3)/MCL (ref 4.5–11.5)

## 2024-03-25 PROCEDURE — 3077F SYST BP >= 140 MM HG: CPT | Mod: CPTII,,, | Performed by: INTERNAL MEDICINE

## 2024-03-25 PROCEDURE — 85025 COMPLETE CBC W/AUTO DIFF WBC: CPT

## 2024-03-25 PROCEDURE — 1160F RVW MEDS BY RX/DR IN RCRD: CPT | Mod: CPTII,,, | Performed by: INTERNAL MEDICINE

## 2024-03-25 PROCEDURE — 3079F DIAST BP 80-89 MM HG: CPT | Mod: CPTII,,, | Performed by: INTERNAL MEDICINE

## 2024-03-25 PROCEDURE — 1159F MED LIST DOCD IN RCRD: CPT | Mod: CPTII,,, | Performed by: INTERNAL MEDICINE

## 2024-03-25 PROCEDURE — 1101F PT FALLS ASSESS-DOCD LE1/YR: CPT | Mod: CPTII,,, | Performed by: INTERNAL MEDICINE

## 2024-03-25 PROCEDURE — 3288F FALL RISK ASSESSMENT DOCD: CPT | Mod: CPTII,,, | Performed by: INTERNAL MEDICINE

## 2024-03-25 PROCEDURE — 84550 ASSAY OF BLOOD/URIC ACID: CPT

## 2024-03-25 PROCEDURE — 99214 OFFICE O/P EST MOD 30 MIN: CPT | Mod: PBBFAC | Performed by: INTERNAL MEDICINE

## 2024-03-25 PROCEDURE — 3044F HG A1C LEVEL LT 7.0%: CPT | Mod: CPTII,,, | Performed by: INTERNAL MEDICINE

## 2024-03-25 PROCEDURE — 83615 LACTATE (LD) (LDH) ENZYME: CPT

## 2024-03-25 PROCEDURE — 3008F BODY MASS INDEX DOCD: CPT | Mod: CPTII,,, | Performed by: INTERNAL MEDICINE

## 2024-03-25 PROCEDURE — 80053 COMPREHEN METABOLIC PANEL: CPT

## 2024-03-25 PROCEDURE — 36415 COLL VENOUS BLD VENIPUNCTURE: CPT

## 2024-03-25 PROCEDURE — 1126F AMNT PAIN NOTED NONE PRSNT: CPT | Mod: CPTII,,, | Performed by: INTERNAL MEDICINE

## 2024-03-25 PROCEDURE — 99214 OFFICE O/P EST MOD 30 MIN: CPT | Mod: S$PBB,,, | Performed by: INTERNAL MEDICINE

## 2024-03-25 NOTE — Clinical Note
In March 2025, surveillance CT scans of C/A/P/soft tissues of the neck with contrast, CBC, CMP, LDH level, then follow-up visit. Screening mammogram in February 2025

## 2024-06-18 DIAGNOSIS — Z12.31 OTHER SCREENING MAMMOGRAM: Primary | ICD-10-CM

## 2024-07-16 ENCOUNTER — OFFICE VISIT (OUTPATIENT)
Dept: GYNECOLOGY | Facility: CLINIC | Age: 69
End: 2024-07-16
Payer: MEDICARE

## 2024-07-16 VITALS
HEART RATE: 74 BPM | RESPIRATION RATE: 18 BRPM | BODY MASS INDEX: 28.79 KG/M2 | HEIGHT: 64 IN | OXYGEN SATURATION: 100 % | TEMPERATURE: 98 F | DIASTOLIC BLOOD PRESSURE: 84 MMHG | SYSTOLIC BLOOD PRESSURE: 132 MMHG | WEIGHT: 168.63 LBS

## 2024-07-16 DIAGNOSIS — Z01.419 WOMEN'S ANNUAL ROUTINE GYNECOLOGICAL EXAMINATION: Primary | ICD-10-CM

## 2024-07-16 DIAGNOSIS — Z12.31 SCREENING MAMMOGRAM FOR BREAST CANCER: ICD-10-CM

## 2024-07-16 PROCEDURE — 3288F FALL RISK ASSESSMENT DOCD: CPT | Mod: CPTII,,, | Performed by: NURSE PRACTITIONER

## 2024-07-16 PROCEDURE — 1160F RVW MEDS BY RX/DR IN RCRD: CPT | Mod: CPTII,,, | Performed by: NURSE PRACTITIONER

## 2024-07-16 PROCEDURE — 1159F MED LIST DOCD IN RCRD: CPT | Mod: CPTII,,, | Performed by: NURSE PRACTITIONER

## 2024-07-16 PROCEDURE — 3079F DIAST BP 80-89 MM HG: CPT | Mod: CPTII,,, | Performed by: NURSE PRACTITIONER

## 2024-07-16 PROCEDURE — 3075F SYST BP GE 130 - 139MM HG: CPT | Mod: CPTII,,, | Performed by: NURSE PRACTITIONER

## 2024-07-16 PROCEDURE — 1101F PT FALLS ASSESS-DOCD LE1/YR: CPT | Mod: CPTII,,, | Performed by: NURSE PRACTITIONER

## 2024-07-16 PROCEDURE — G0101 CA SCREEN;PELVIC/BREAST EXAM: HCPCS | Mod: PBBFAC | Performed by: NURSE PRACTITIONER

## 2024-07-16 PROCEDURE — 99214 OFFICE O/P EST MOD 30 MIN: CPT | Mod: PBBFAC | Performed by: NURSE PRACTITIONER

## 2024-07-16 PROCEDURE — G0101 CA SCREEN;PELVIC/BREAST EXAM: HCPCS | Mod: S$PBB,,, | Performed by: NURSE PRACTITIONER

## 2024-07-16 PROCEDURE — 3044F HG A1C LEVEL LT 7.0%: CPT | Mod: CPTII,,, | Performed by: NURSE PRACTITIONER

## 2024-07-16 NOTE — PROGRESS NOTES
"Patient ID: Jeane Cohen is a 69 y.o. female.    Chief Complaint: Well Woman      Review of patient's allergies indicates:  No Known Allergies          HPI:  Pt is  (SABx1) here for annual gyn exam. Denies hx of abnormal pap. Reports hx of hysterectomy with BSO secondary to symptomatic uterine fibroids. Denies vaginal bleeding, itching, or discharge. Denies breast/urinary complaints.   Pt with hx of non-hodgkin's lymphoma diagnosed in . Completed chemotherapy 2017. Currently in remission. Pt is . Denies dyspareunia or postcoital spotting.  Followed in medicine clinic for primary care     Review of Systems:   Negative except for findings in HPI     Objective:   /84   Pulse 74   Temp 98.1 °F (36.7 °C) (Oral)   Resp 18   Ht 5' 4" (1.626 m)   Wt 76.5 kg (168 lb 10.4 oz)   SpO2 100%   BMI 28.95 kg/m²    Physical Exam:  GENERAL: Pt is aware and alert and  in no acute distress.  BREASTS: Bilateral-No masses, nipple discharge, skin changes, or tenderness.  ABDOMEN: Soft, non tender.  VULVA:  No lesions or skin changes.  URETHRA: No lesions  BLADDER: No tenderness.  VAGINA: Mucosa pale,no discharge or lesions.  CERVIX:  absent  BIMANUAL EXAM:. The uterus is absent. Dennis adnexa reveal no evidence of masses; no fullness   SKIN: Warm and Dry  PSYCHIATRIC: Patient is awake and alert. Mood and affect are normal  Assessment:   Women's annual routine gynecological examination    Screening mammogram for breast cancer  -     Mammo Digital Screening Bilat w/ Scar; Future; Expected date: 2025            1. Women's annual routine gynecological examination    2. Screening mammogram for breast cancer           -pelvic; pap deferred secondary to hyst for benign conditions per ACOG guidelines     Plan:       Follow up in about 1 year (around 2025).    "

## 2025-02-20 ENCOUNTER — TELEPHONE (OUTPATIENT)
Dept: RADIOLOGY | Facility: HOSPITAL | Age: 70
End: 2025-02-20
Payer: MEDICARE

## 2025-02-20 ENCOUNTER — HOSPITAL ENCOUNTER (OUTPATIENT)
Dept: RADIOLOGY | Facility: HOSPITAL | Age: 70
Discharge: HOME | End: 2025-02-20
Attending: INTERNAL MEDICINE
Payer: MEDICARE

## 2025-02-20 DIAGNOSIS — Z12.31 OTHER SCREENING MAMMOGRAM: ICD-10-CM

## 2025-02-20 DIAGNOSIS — N64.59 BREAST COMPLAINT: ICD-10-CM

## 2025-03-03 ENCOUNTER — HOSPITAL ENCOUNTER (OUTPATIENT)
Dept: RADIOLOGY | Facility: HOSPITAL | Age: 70
Discharge: HOME | End: 2025-03-03
Attending: INTERNAL MEDICINE
Payer: MEDICARE

## 2025-03-03 DIAGNOSIS — Z08 ENCOUNTER FOR FOLLOW-UP SURVEILLANCE OF LYMPHOMA: ICD-10-CM

## 2025-03-03 DIAGNOSIS — Z85.72 ENCOUNTER FOR FOLLOW-UP SURVEILLANCE OF LYMPHOMA: ICD-10-CM

## 2025-03-03 DIAGNOSIS — R94.8 ABNORMAL POSITRON EMISSION TOMOGRAPHY (PET) SCAN: ICD-10-CM

## 2025-03-03 DIAGNOSIS — R59.0 PELVIC LYMPHADENOPATHY: ICD-10-CM

## 2025-03-03 DIAGNOSIS — C82.80 FOLLICULAR LARGE CELL NON-HODGKIN'S LYMPHOMA: ICD-10-CM

## 2025-03-03 LAB
CREAT SERPL-MCNC: 0.79 MG/DL (ref 0.55–1.02)
GFR SERPLBLD CREATININE-BSD FMLA CKD-EPI: >60 ML/MIN/1.73/M2

## 2025-03-03 PROCEDURE — 71260 CT THORAX DX C+: CPT | Mod: TC

## 2025-03-03 PROCEDURE — 25500020 PHARM REV CODE 255

## 2025-03-03 PROCEDURE — 82565 ASSAY OF CREATININE: CPT | Performed by: INTERNAL MEDICINE

## 2025-03-03 PROCEDURE — 70491 CT SOFT TISSUE NECK W/DYE: CPT | Mod: TC

## 2025-03-03 RX ORDER — DIATRIZOATE MEGLUMINE AND DIATRIZOATE SODIUM 660; 100 MG/ML; MG/ML
SOLUTION ORAL; RECTAL
Status: COMPLETED
Start: 2025-03-03 | End: 2025-03-03

## 2025-03-03 RX ADMIN — IOHEXOL 95 ML: 350 INJECTION, SOLUTION INTRAVENOUS at 08:03

## 2025-03-03 RX ADMIN — DIATRIZOATE MEGLUMINE AND DIATRIZOATE SODIUM 30 ML: 660; 100 LIQUID ORAL; RECTAL at 08:03

## 2025-03-05 ENCOUNTER — TELEPHONE (OUTPATIENT)
Dept: HEMATOLOGY/ONCOLOGY | Facility: CLINIC | Age: 70
End: 2025-03-05
Payer: MEDICARE

## 2025-03-12 ENCOUNTER — HOSPITAL ENCOUNTER (OUTPATIENT)
Dept: RADIOLOGY | Facility: HOSPITAL | Age: 70
Discharge: HOME | End: 2025-03-12
Attending: INTERNAL MEDICINE
Payer: MEDICARE

## 2025-03-12 DIAGNOSIS — N63.20 LEFT BREAST MASS: ICD-10-CM

## 2025-03-12 PROCEDURE — 77062 BREAST TOMOSYNTHESIS BI: CPT | Mod: TC

## 2025-03-12 PROCEDURE — 76642 ULTRASOUND BREAST LIMITED: CPT | Mod: TC,LT

## 2025-03-12 PROCEDURE — 76642 ULTRASOUND BREAST LIMITED: CPT | Mod: 26,LT,, | Performed by: RADIOLOGY

## 2025-04-02 PROBLEM — Z85.72 ENCOUNTER FOR FOLLOW-UP SURVEILLANCE OF LYMPHOMA: Status: ACTIVE | Noted: 2025-04-02

## 2025-04-02 PROBLEM — Z08 ENCOUNTER FOR FOLLOW-UP SURVEILLANCE OF LYMPHOMA: Status: ACTIVE | Noted: 2025-04-02

## 2025-04-03 ENCOUNTER — LAB VISIT (OUTPATIENT)
Dept: HEMATOLOGY/ONCOLOGY | Facility: CLINIC | Age: 70
End: 2025-04-03
Payer: MEDICARE

## 2025-04-03 ENCOUNTER — OFFICE VISIT (OUTPATIENT)
Dept: HEMATOLOGY/ONCOLOGY | Facility: CLINIC | Age: 70
End: 2025-04-03
Attending: INTERNAL MEDICINE
Payer: MEDICARE

## 2025-04-03 VITALS
SYSTOLIC BLOOD PRESSURE: 139 MMHG | HEART RATE: 78 BPM | WEIGHT: 169 LBS | TEMPERATURE: 99 F | HEIGHT: 64 IN | OXYGEN SATURATION: 98 % | BODY MASS INDEX: 28.85 KG/M2 | RESPIRATION RATE: 18 BRPM | DIASTOLIC BLOOD PRESSURE: 71 MMHG

## 2025-04-03 DIAGNOSIS — Z08 ENCOUNTER FOR FOLLOW-UP SURVEILLANCE OF LYMPHOMA: ICD-10-CM

## 2025-04-03 DIAGNOSIS — Z85.72 ENCOUNTER FOR FOLLOW-UP SURVEILLANCE OF LYMPHOMA: ICD-10-CM

## 2025-04-03 DIAGNOSIS — Z12.31 OTHER SCREENING MAMMOGRAM: ICD-10-CM

## 2025-04-03 DIAGNOSIS — C82.80 FOLLICULAR LARGE CELL NON-HODGKIN'S LYMPHOMA: ICD-10-CM

## 2025-04-03 DIAGNOSIS — R31.29 MICROSCOPIC HEMATURIA: ICD-10-CM

## 2025-04-03 DIAGNOSIS — R59.0 CERVICAL LYMPHADENOPATHY: ICD-10-CM

## 2025-04-03 DIAGNOSIS — R94.8 ABNORMAL POSITRON EMISSION TOMOGRAPHY (PET) SCAN: ICD-10-CM

## 2025-04-03 DIAGNOSIS — Z85.72 HISTORY OF FOLLICULAR LYMPHOMA: Primary | ICD-10-CM

## 2025-04-03 DIAGNOSIS — R59.0 PELVIC LYMPHADENOPATHY: ICD-10-CM

## 2025-04-03 LAB
ALBUMIN SERPL-MCNC: 3.6 G/DL (ref 3.4–4.8)
ALBUMIN/GLOB SERPL: 1 RATIO (ref 1.1–2)
ALP SERPL-CCNC: 73 UNIT/L (ref 40–150)
ALT SERPL-CCNC: 25 UNIT/L (ref 0–55)
ANION GAP SERPL CALC-SCNC: 4 MEQ/L
AST SERPL-CCNC: 28 UNIT/L (ref 11–45)
BASOPHILS # BLD AUTO: 0.06 X10(3)/MCL
BASOPHILS NFR BLD AUTO: 1.1 %
BILIRUB SERPL-MCNC: 0.6 MG/DL
BUN SERPL-MCNC: 14.9 MG/DL (ref 9.8–20.1)
CALCIUM SERPL-MCNC: 8.9 MG/DL (ref 8.4–10.2)
CHLORIDE SERPL-SCNC: 110 MMOL/L (ref 98–107)
CO2 SERPL-SCNC: 28 MMOL/L (ref 23–31)
CREAT SERPL-MCNC: 0.76 MG/DL (ref 0.55–1.02)
CREAT/UREA NIT SERPL: 20
EOSINOPHIL # BLD AUTO: 0.29 X10(3)/MCL (ref 0–0.9)
EOSINOPHIL NFR BLD AUTO: 5.2 %
ERYTHROCYTE [DISTWIDTH] IN BLOOD BY AUTOMATED COUNT: 15.1 % (ref 11.5–17)
GFR SERPLBLD CREATININE-BSD FMLA CKD-EPI: >60 ML/MIN/1.73/M2
GLOBULIN SER-MCNC: 3.7 GM/DL (ref 2.4–3.5)
GLUCOSE SERPL-MCNC: 83 MG/DL (ref 82–115)
HCT VFR BLD AUTO: 38.2 % (ref 37–47)
HGB BLD-MCNC: 12 G/DL (ref 12–16)
IMM GRANULOCYTES # BLD AUTO: 0.01 X10(3)/MCL (ref 0–0.04)
IMM GRANULOCYTES NFR BLD AUTO: 0.2 %
LDH SERPL-CCNC: 179 U/L (ref 125–220)
LYMPHOCYTES # BLD AUTO: 1.82 X10(3)/MCL (ref 0.6–4.6)
LYMPHOCYTES NFR BLD AUTO: 32.7 %
MCH RBC QN AUTO: 26.3 PG (ref 27–31)
MCHC RBC AUTO-ENTMCNC: 31.4 G/DL (ref 33–36)
MCV RBC AUTO: 83.8 FL (ref 80–94)
MONOCYTES # BLD AUTO: 1.14 X10(3)/MCL (ref 0.1–1.3)
MONOCYTES NFR BLD AUTO: 20.5 %
NEUTROPHILS # BLD AUTO: 2.24 X10(3)/MCL (ref 2.1–9.2)
NEUTROPHILS NFR BLD AUTO: 40.3 %
NRBC BLD AUTO-RTO: 0 %
PLATELET # BLD AUTO: 193 X10(3)/MCL (ref 130–400)
PMV BLD AUTO: 10.3 FL (ref 7.4–10.4)
POTASSIUM SERPL-SCNC: 3.8 MMOL/L (ref 3.5–5.1)
PROT SERPL-MCNC: 7.3 GM/DL (ref 5.8–7.6)
RBC # BLD AUTO: 4.56 X10(6)/MCL (ref 4.2–5.4)
SODIUM SERPL-SCNC: 142 MMOL/L (ref 136–145)
WBC # BLD AUTO: 5.56 X10(3)/MCL (ref 4.5–11.5)

## 2025-04-03 PROCEDURE — 83615 LACTATE (LD) (LDH) ENZYME: CPT

## 2025-04-03 PROCEDURE — 80053 COMPREHEN METABOLIC PANEL: CPT

## 2025-04-03 PROCEDURE — 85025 COMPLETE CBC W/AUTO DIFF WBC: CPT

## 2025-04-03 PROCEDURE — 99214 OFFICE O/P EST MOD 30 MIN: CPT | Mod: PBBFAC | Performed by: INTERNAL MEDICINE

## 2025-04-03 PROCEDURE — 36415 COLL VENOUS BLD VENIPUNCTURE: CPT

## 2025-04-03 NOTE — Clinical Note
Orders for 04/03/2025:  In March 2026, surveillance CT scans of C/A/P/neck with contrast, CBC, CMP, and LDH, then follow-up visit Screening mammogram in March 2026
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
## 2025-04-28 ENCOUNTER — LAB VISIT (OUTPATIENT)
Dept: LAB | Facility: HOSPITAL | Age: 70
End: 2025-04-28
Attending: NURSE PRACTITIONER
Payer: MEDICARE

## 2025-04-28 ENCOUNTER — OFFICE VISIT (OUTPATIENT)
Dept: INTERNAL MEDICINE | Facility: CLINIC | Age: 70
End: 2025-04-28
Payer: MEDICARE

## 2025-04-28 DIAGNOSIS — L81.9 DISCOLORATION OF SKIN: ICD-10-CM

## 2025-04-28 DIAGNOSIS — Z00.00 WELLNESS EXAMINATION: ICD-10-CM

## 2025-04-28 DIAGNOSIS — E28.39 ESTROGEN DEFICIENCY: Primary | ICD-10-CM

## 2025-04-28 DIAGNOSIS — Z00.00 WELLNESS EXAMINATION: Primary | ICD-10-CM

## 2025-04-28 DIAGNOSIS — R73.03 PREDIABETES: ICD-10-CM

## 2025-04-28 DIAGNOSIS — R31.29 MICROSCOPIC HEMATURIA: ICD-10-CM

## 2025-04-28 LAB
BACTERIA #/AREA URNS AUTO: ABNORMAL /HPF
BILIRUB UR QL STRIP.AUTO: NEGATIVE
CHOLEST SERPL-MCNC: 139 MG/DL
CHOLEST/HDLC SERPL: 2 {RATIO} (ref 0–5)
CLARITY UR: CLEAR
COLOR UR AUTO: YELLOW
EST. AVERAGE GLUCOSE BLD GHB EST-MCNC: 122.6 MG/DL
GLUCOSE UR QL STRIP: NORMAL
HBA1C MFR BLD: 5.9 %
HDLC SERPL-MCNC: 64 MG/DL (ref 35–60)
HGB UR QL STRIP: ABNORMAL
HYALINE CASTS #/AREA URNS LPF: ABNORMAL /LPF
KETONES UR QL STRIP: NEGATIVE
LDLC SERPL CALC-MCNC: 67 MG/DL (ref 50–140)
LEUKOCYTE ESTERASE UR QL STRIP: 75
MUCOUS THREADS URNS QL MICRO: ABNORMAL /LPF
NITRITE UR QL STRIP: NEGATIVE
PH UR STRIP: 7.5 [PH]
PROT UR QL STRIP: ABNORMAL
RBC #/AREA URNS AUTO: ABNORMAL /HPF
SP GR UR STRIP.AUTO: 1.02 (ref 1–1.03)
SQUAMOUS #/AREA URNS LPF: ABNORMAL /HPF
TRIGL SERPL-MCNC: 42 MG/DL (ref 37–140)
UROBILINOGEN UR STRIP-ACNC: NORMAL
VLDLC SERPL CALC-MCNC: 8 MG/DL
WBC #/AREA URNS AUTO: ABNORMAL /HPF

## 2025-04-28 PROCEDURE — 99214 OFFICE O/P EST MOD 30 MIN: CPT | Mod: PBBFAC | Performed by: NURSE PRACTITIONER

## 2025-04-28 PROCEDURE — 99214 OFFICE O/P EST MOD 30 MIN: CPT | Mod: S$PBB,,, | Performed by: NURSE PRACTITIONER

## 2025-04-28 PROCEDURE — 1101F PT FALLS ASSESS-DOCD LE1/YR: CPT | Mod: CPTII,,, | Performed by: NURSE PRACTITIONER

## 2025-04-28 PROCEDURE — 83036 HEMOGLOBIN GLYCOSYLATED A1C: CPT

## 2025-04-28 PROCEDURE — 3288F FALL RISK ASSESSMENT DOCD: CPT | Mod: CPTII,,, | Performed by: NURSE PRACTITIONER

## 2025-04-28 PROCEDURE — 80061 LIPID PANEL: CPT

## 2025-04-28 PROCEDURE — 81015 MICROSCOPIC EXAM OF URINE: CPT

## 2025-04-28 PROCEDURE — 1160F RVW MEDS BY RX/DR IN RCRD: CPT | Mod: CPTII,,, | Performed by: NURSE PRACTITIONER

## 2025-04-28 PROCEDURE — 1159F MED LIST DOCD IN RCRD: CPT | Mod: CPTII,,, | Performed by: NURSE PRACTITIONER

## 2025-04-28 PROCEDURE — 3044F HG A1C LEVEL LT 7.0%: CPT | Mod: CPTII,,, | Performed by: NURSE PRACTITIONER

## 2025-04-28 PROCEDURE — 36415 COLL VENOUS BLD VENIPUNCTURE: CPT

## 2025-04-28 RX ORDER — LOSARTAN POTASSIUM AND HYDROCHLOROTHIAZIDE 12.5; 5 MG/1; MG/1
1 TABLET ORAL EVERY MORNING
COMMUNITY
Start: 2025-02-07

## 2025-04-28 NOTE — ASSESSMENT & PLAN NOTE
Follow ADA diet.  Avoid soda, simple sweets, and limit rice/pasta/bread/starches and consume brown options when possible.   Maintain healthy weight with BMI goal <30.   Perform aerobic exercise for 150 minutes per week (or 5 days a week for 30 minutes each day).   Examine feet daily.     Lab Results   Component Value Date    HGBA1C 5.9 04/28/2025

## 2025-04-28 NOTE — PROGRESS NOTES
"KARLY Leija   [facility]  [address] [facility] [address]      PATIENT NAME: Jeane Cohen  : 1955  DATE: 25  MRN: [id number]      Reason for Visit / Chief Complaint: prediabetes (Lab review )       History of Present Illness / Problem Focused Workflow     Jeane Cohen presents to the clinic with prediabetes (Lab review )     69 yo AAF here today for routine f/u. Medical problems include Stage IV follicular Grade 1 non-Hodgkins lymphoma, diagnosed 2016, CAD, chronic hoarseness, chronic microscopic hematuria since , prediabetes, and Vitamin D deficiency.  Followed by Dr. Mace, [facility], Cardiology, & Urology Clinic. Jehovah Witness.  Never smoker.       Cervical Cancer Screening: [facility]  Breast Cancer Screenin23 MMG   Colon Cancer Screening: UTD  Osteoporosis Screenin25 DEXA Ordered     2025  History of Present Illness  Patient presents today for follow up She has a longstanding history of chronic hematuria detected only on urinalysis. She denies gross hematuria, dysuria, or urinary discomfort. She reports feeling self-conscious about her skin appearance, particularly when wearing dresses due to area on the left shin that has been present for a while. Agreable to referral for Derm in [place] for eval. Over-the-counter treatments have not provided satisfactory results. HDL was elevated. A1C was 5.9, consistent with pre-diabetes. Urinalysis showed bacteria and RBCs. Mammogram in March was normal. Bone density scan from  showed osteopenia, will order repeat scan. Denies any acute issues today. Will f/u in 1 year for wellness and prn.               Review of Systems     Review of Systems      Medications and Allergies     Medications  Medication List with Changes/Refills   Current Medications    ASPIRIN (ECOTRIN) 81 MG EC TABLET    Take 81 mg by mouth once. "Every other day"    B " COMPLEX VITAMINS TABLET    Take 1 tablet by mouth once daily.    CALCIUM CARBONATE 1250 MG CAPSULE    Take 1,250 mg by mouth 2 (two) times daily with meals.    LOSARTAN-HYDROCHLOROTHIAZIDE 50-12.5 MG (HYZAAR) 50-12.5 MG PER TABLET    Take 1 tablet by mouth every morning.    MAGNESIUM 30 MG TAB    Take by mouth once.    MULTIVITAMIN CAPSULE    Take 1 capsule by mouth once daily.    POLYETHYLENE GLYCOL (MOVIPREP) 100-7.5-2.691 GRAM SOLUTION    Take as directed prior to colonoscopy    TRIAMCINOLONE ACETONIDE 0.1% (KENALOG) 0.1 % CREAM    Apply topically 2 (two) times daily as needed (Itching).    VITAMIN D (VITAMIN D3) 1000 UNITS TAB    Take 1,000 Units by mouth once daily.    ZINC GLUCONATE 50 MG TABLET    Take 50 mg by mouth once daily.         Allergies  Review of patient's allergies indicates:  No Known Allergies    Physical Examination   There were no vitals filed for this visit.  Physical Exam      Results     Lab Results   Component Value Date    WBC 5.56 04/03/2025    RBC 4.56 04/03/2025    HGB 12.0 04/03/2025    HCT 38.2 04/03/2025    MCV 83.8 04/03/2025    MCH 26.3 (L) 04/03/2025    MCHC 31.4 (L) 04/03/2025    RDW 15.1 04/03/2025     04/03/2025    MPV 10.3 04/03/2025     Sodium   Date Value Ref Range Status   04/03/2025 142 136 - 145 mmol/L Final     Potassium   Date Value Ref Range Status   04/03/2025 3.8 3.5 - 5.1 mmol/L Final     Chloride   Date Value Ref Range Status   04/03/2025 110 (H) 98 - 107 mmol/L Final     CO2   Date Value Ref Range Status   04/03/2025 28 23 - 31 mmol/L Final     Blood Urea Nitrogen   Date Value Ref Range Status   04/03/2025 14.9 9.8 - 20.1 mg/dL Final     Creatinine   Date Value Ref Range Status   04/03/2025 0.76 0.55 - 1.02 mg/dL Final     Calcium   Date Value Ref Range Status   04/03/2025 8.9 8.4 - 10.2 mg/dL Final     Albumin   Date Value Ref Range Status   04/03/2025 3.6 3.4 - 4.8 g/dL Final     Bilirubin Total   Date Value Ref Range Status   04/03/2025 0.6 <=1.5 mg/dL  Final     ALP   Date Value Ref Range Status   04/03/2025 73 40 - 150 unit/L Final     AST   Date Value Ref Range Status   04/03/2025 28 11 - 45 unit/L Final     ALT   Date Value Ref Range Status   04/03/2025 25 0 - 55 unit/L Final     Estimated GFR-Non    Date Value Ref Range Status   02/28/2022 82 >=90      Lab Results   Component Value Date    CHOL 139 04/28/2025     Lab Results   Component Value Date    HDL 64 (H) 04/28/2025     Lab Results   Component Value Date    TRIG 42 04/28/2025     Lab Results   Component Value Date    VLDL 8 04/28/2025     Lab Results   Component Value Date    LDL 67.00 04/28/2025     Lab Results   Component Value Date    TSH 0.502 01/03/2023     Lab Results   Component Value Date    PHUR 7.5 04/28/2025    SPECGRAV 1.015 02/14/2023    PROTEINUA Trace (A) 04/28/2025    GLUCUA Normal 04/28/2025    KETONESU Negative 02/14/2023    OCCULTUA 2+ (A) 04/28/2025    NITRITE Negative 04/28/2025    LEUKOCYTESUR 75 (A) 04/28/2025     Lab Results   Component Value Date    HGBA1C 5.9 04/28/2025    HGBA1C 5.6 01/22/2024    HGBA1C 5.8 01/03/2023           Assessment         ICD-10-CM ICD-9-CM   1. Estrogen deficiency  E28.39 256.39   2. Discoloration of skin  L81.9 709.00   3. Prediabetes  R73.03 790.29   4. Microscopic hematuria  R31.29 599.72       Plan      1. Estrogen deficiency  -     DXA Bone Density Appendicular Skeleton; Future; Expected date: 04/28/2025    2. Discoloration of skin  Assessment & Plan:  Referral to Derm - External     Orders:  -     Ambulatory referral/consult to Dermatology; Future; Expected date: 04/28/2025    3. Prediabetes  Assessment & Plan:    Follow ADA diet.  Avoid soda, simple sweets, and limit rice/pasta/bread/starches and consume brown options when possible.   Maintain healthy weight with BMI goal <30.   Perform aerobic exercise for 150 minutes per week (or 5 days a week for 30 minutes each day).   Examine feet daily.     Lab Results   Component Value  Date    HGBA1C 5.9 04/28/2025         Orders:  -     Hemoglobin A1C; Future; Expected date: 04/28/2026  -     Urinalysis, Reflex to Urine Culture; Future; Expected date: 04/28/2026    4. Microscopic hematuria  Assessment & Plan:  Stable  Continue f/u            Future Appointments   Date Time Provider Department Center   7/17/2025  9:10 AM Eileen Stein, KARLY [facility] GYN [place]    3/12/2026 10:00 AM [facility] [id number] [facility] MAMMO [place]    3/18/2026  9:15 AM NURSE, [facility] HEMATOLOGY ONCOLOGY [facility] HEMOMC [place]    3/18/2026 10:20 AM Mauricio Mace MD [facility] [place]    4/30/2026  8:40 AM Dayanna Treviño FNP [facility] [place]            Signature:     [facility]  [address] [facility] [address]    Date of encounter: 4/28/25    This note was generated with the assistance of ambient listening technology. Verbal consent was obtained by the patient and accompanying visitor(s) for the recording of patient appointment to facilitate this note. I attest to having reviewed and edited the generated note for accuracy, though some syntax or spelling errors may persist. Please contact the author of this note for any clarification.

## 2025-06-05 ENCOUNTER — PATIENT OUTREACH (OUTPATIENT)
Facility: CLINIC | Age: 70
End: 2025-06-05
Payer: MEDICARE

## 2025-07-17 ENCOUNTER — OFFICE VISIT (OUTPATIENT)
Dept: GYNECOLOGY | Facility: CLINIC | Age: 70
End: 2025-07-17
Payer: MEDICARE

## 2025-07-17 ENCOUNTER — HOSPITAL ENCOUNTER (OUTPATIENT)
Dept: RADIOLOGY | Facility: HOSPITAL | Age: 70
Discharge: HOME | End: 2025-07-17
Attending: NURSE PRACTITIONER
Payer: MEDICARE

## 2025-07-17 VITALS
RESPIRATION RATE: 18 BRPM | SYSTOLIC BLOOD PRESSURE: 138 MMHG | TEMPERATURE: 98 F | HEIGHT: 64 IN | OXYGEN SATURATION: 100 % | BODY MASS INDEX: 28.48 KG/M2 | WEIGHT: 166.81 LBS | DIASTOLIC BLOOD PRESSURE: 82 MMHG | HEART RATE: 69 BPM

## 2025-07-17 DIAGNOSIS — Z01.419 WOMEN'S ANNUAL ROUTINE GYNECOLOGICAL EXAMINATION: Primary | ICD-10-CM

## 2025-07-17 DIAGNOSIS — E28.39 ESTROGEN DEFICIENCY: ICD-10-CM

## 2025-07-17 PROCEDURE — G0101 CA SCREEN;PELVIC/BREAST EXAM: HCPCS | Mod: PBBFAC | Performed by: NURSE PRACTITIONER

## 2025-07-17 PROCEDURE — 77081 DXA BONE DENSITY APPENDICULR: CPT | Mod: TC

## 2025-07-17 PROCEDURE — 99214 OFFICE O/P EST MOD 30 MIN: CPT | Mod: PBBFAC,25 | Performed by: NURSE PRACTITIONER

## 2025-07-17 NOTE — PROGRESS NOTES
"Patient ID: Jeane Cohen is a 70 y.o. female.    Chief Complaint: Gynecologic Exam      Review of patient's allergies indicates:  No Known Allergies        HPI:  Pt is  (SABx1) here for annual gyn exam. Denies hx of abnormal pap. Reports hx of hysterectomy with BSO secondary to symptomatic uterine fibroids. Denies vaginal bleeding, itching, or discharge. Denies breast/urinary complaints.   Pt with hx of non-hodgkin's lymphoma diagnosed in . Completed chemotherapy 2017. Currently in remission. Pt is . Denies dyspareunia or postcoital spotting.  Followed in medicine clinic for primary care. Last colonoscopy  with 5 year recall. Mammogram is UTD. State is scheduled for DEXA today (ordered by pcp).    Review of Systems:   Negative except for findings in HPI     Objective:   /82   Pulse 69   Temp 97.7 °F (36.5 °C) (Oral)   Resp 18   Ht 5' 4" (1.626 m)   Wt 75.7 kg (166 lb 12.8 oz)   SpO2 100%   BMI 28.63 kg/m²    Physical Exam:  GENERAL: Pt is aware and alert and  in no acute distress.  BREASTS: Bilateral-No masses, nipple discharge, skin changes, or tenderness.  ABDOMEN: Soft, non tender.  VULVA:  No lesions or skin changes.  URETHRA: No lesions  BLADDER: No tenderness.  VAGINA: Mucosa atrophic,no discharge or lesions.  CERVIX:  absent  BIMANUAL EXAM:. The uterus is absent. Dennis adnexa reveal no evidence of masses; no fullness   SKIN: Warm and Dry  PSYCHIATRIC: Patient is awake and alert. Mood and affect are normal    Assessment:   Women's annual routine gynecological examination            1. Women's annual routine gynecological examination             -pelvic; pap deferred secondary to hyst for benign conditions per ACOG guidelines   Plan:       Follow up in about 1 year (around 2026).    "

## 2025-07-18 ENCOUNTER — RESULTS FOLLOW-UP (OUTPATIENT)
Dept: INTERNAL MEDICINE | Facility: CLINIC | Age: 70
End: 2025-07-18
Payer: MEDICARE

## 2025-07-29 NOTE — TELEPHONE ENCOUNTER
I attempted to contact patient I attempted to contact patient.  I left v/m stating :   Provider Dayanna Treviño, KARLY has information to share of your recent x ray , lab , etc. We work as a team [facility] so anyone can help you.   Call [facility] at [phone].

## 2025-07-29 NOTE — TELEPHONE ENCOUNTER
I contacted patient.   I informed provider KARLY Britt reviewed  bone density          with findings above.   New order : calcium and vitamin D  Patient voiced understanding.

## (undated) DEVICE — CONTAINER SPECIMEN OR STER 4OZ

## (undated) DEVICE — SOL 9P NACL IRR PIC IL

## (undated) DEVICE — MANIFOLD 4 PORT

## (undated) DEVICE — SUT COATED VICRYL 4/0 27IN

## (undated) DEVICE — CORD BIPOLAR 12 FOOT

## (undated) DEVICE — NDL ECLIPSE SAFETY 18GX1-1/2IN

## (undated) DEVICE — SUT SA85H SILK 2-0

## (undated) DEVICE — STAPLER SKIN ROTATING HEAD

## (undated) DEVICE — SYR 10CC LUER LOCK

## (undated) DEVICE — SUT PDSII 4-0 PS-2 CLEAR MO

## (undated) DEVICE — Device

## (undated) DEVICE — SUT CTD VICRYL 3-0 CR/SH

## (undated) DEVICE — GLOVE SIGNATURE ESSNTL LTX 6.5

## (undated) DEVICE — GLOVE PROTEXIS HYDROGEL SZ7.5

## (undated) DEVICE — GLOVE SENSICARE PI GRN 7

## (undated) DEVICE — MARKER WRITESITE SKIN CHLRAPRP

## (undated) DEVICE — SUT 5/0 18IN PLAIN FAST AB

## (undated) DEVICE — KIT SURGICAL TURNOVER

## (undated) DEVICE — TRAY SKIN SCRUB WET PREMIUM

## (undated) DEVICE — CLOSURE SKIN STERI STRIP 1/2X4

## (undated) DEVICE — KIT SURGICAL COLON .25 1.1OZ

## (undated) DEVICE — HANDLE DEVON RIGID OR LIGHT

## (undated) DEVICE — SOLIDIFIER BOTTLE 1500CC

## (undated) DEVICE — STRIP MEDI WND CLSR 1/2X4IN

## (undated) DEVICE — GLOVE SENSICARE PI GRN 6.5

## (undated) DEVICE — GLOVE PROTEXIS LTX MICRO  7.5

## (undated) DEVICE — GOWN POLY REINF BRTH SLV XL

## (undated) DEVICE — GLOVE PROTEXIS BLUE LATEX 7

## (undated) DEVICE — PAD CURAD NONADH 3X4IN

## (undated) DEVICE — ADHESIVE MASTISOL VIAL 48/BX

## (undated) DEVICE — NDL 27G X 1 1/4